# Patient Record
Sex: FEMALE | Race: WHITE | Employment: OTHER | URBAN - NONMETROPOLITAN AREA
[De-identification: names, ages, dates, MRNs, and addresses within clinical notes are randomized per-mention and may not be internally consistent; named-entity substitution may affect disease eponyms.]

---

## 2017-03-24 ENCOUNTER — HOSPITAL ENCOUNTER (OUTPATIENT)
Dept: WOMENS IMAGING | Age: 72
Discharge: HOME | End: 2017-03-24
Payer: MEDICARE

## 2017-03-24 DIAGNOSIS — M81.0 OSTEOPOROSIS, POSTMENOPAUSAL: ICD-10-CM

## 2017-03-24 DIAGNOSIS — Z12.31 SCREENING MAMMOGRAM, ENCOUNTER FOR: ICD-10-CM

## 2017-03-24 PROCEDURE — 77080 DXA BONE DENSITY AXIAL: CPT

## 2017-03-24 PROCEDURE — G0202 SCR MAMMO BI INCL CAD: HCPCS

## 2017-05-02 PROBLEM — M81.0 OSTEOPOROSIS, UNSPECIFIED: Status: ACTIVE | Noted: 2017-05-02

## 2017-05-04 ENCOUNTER — INFUSION (OUTPATIENT)
Dept: ONCOLOGY | Facility: HOSPITAL | Age: 72
End: 2017-05-04

## 2017-07-05 RX ORDER — CALCITONIN SALMON 200 [IU]/.09ML
1 SPRAY, METERED NASAL DAILY
Qty: 1 BOTTLE | Refills: 3 | Status: SHIPPED | OUTPATIENT
Start: 2017-07-05 | End: 2017-09-19 | Stop reason: CLARIF

## 2017-09-09 PROBLEM — R73.01 IFG (IMPAIRED FASTING GLUCOSE): Status: ACTIVE | Noted: 2017-09-09

## 2017-09-09 PROBLEM — Z12.12 SCREENING FOR COLORECTAL CANCER: Status: ACTIVE | Noted: 2017-09-09

## 2017-09-09 PROBLEM — Z12.11 SCREENING FOR COLORECTAL CANCER: Status: ACTIVE | Noted: 2017-09-09

## 2017-09-09 PROBLEM — E78.00 PURE HYPERCHOLESTEROLEMIA: Status: ACTIVE | Noted: 2017-09-09

## 2017-09-09 PROBLEM — I10 ESSENTIAL HYPERTENSION: Status: ACTIVE | Noted: 2017-09-09

## 2017-09-09 PROBLEM — M81.6 LOCALIZED OSTEOPOROSIS WITHOUT CURRENT PATHOLOGICAL FRACTURE: Status: ACTIVE | Noted: 2017-09-09

## 2017-09-12 DIAGNOSIS — E78.00 PURE HYPERCHOLESTEROLEMIA: ICD-10-CM

## 2017-09-12 DIAGNOSIS — R30.0 DYSURIA: ICD-10-CM

## 2017-09-12 DIAGNOSIS — I10 ESSENTIAL HYPERTENSION: Primary | ICD-10-CM

## 2017-09-14 DIAGNOSIS — R30.0 DYSURIA: ICD-10-CM

## 2017-09-14 DIAGNOSIS — E78.00 PURE HYPERCHOLESTEROLEMIA: ICD-10-CM

## 2017-09-14 DIAGNOSIS — I10 ESSENTIAL HYPERTENSION: ICD-10-CM

## 2017-09-14 LAB
ALBUMIN SERPL-MCNC: 4.2 G/DL (ref 3.5–5.2)
ALP BLD-CCNC: 78 U/L (ref 35–104)
ALT SERPL-CCNC: 21 U/L (ref 5–33)
ANION GAP SERPL CALCULATED.3IONS-SCNC: 12 MMOL/L (ref 7–19)
AST SERPL-CCNC: 20 U/L (ref 5–32)
BACTERIA: NEGATIVE /HPF
BILIRUB SERPL-MCNC: 0.4 MG/DL (ref 0.2–1.2)
BILIRUBIN URINE: NEGATIVE
BLOOD, URINE: NEGATIVE
BUN BLDV-MCNC: 9 MG/DL (ref 8–23)
CALCIUM SERPL-MCNC: 10 MG/DL (ref 8.8–10.2)
CHLORIDE BLD-SCNC: 100 MMOL/L (ref 98–111)
CLARITY: CLEAR
CO2: 27 MMOL/L (ref 22–29)
COLOR: YELLOW
CREAT SERPL-MCNC: 0.4 MG/DL (ref 0.5–0.9)
EPITHELIAL CELLS, UA: 2 /HPF (ref 0–5)
GFR NON-AFRICAN AMERICAN: >60
GLUCOSE BLD-MCNC: 87 MG/DL (ref 74–109)
GLUCOSE URINE: NEGATIVE MG/DL
HCT VFR BLD CALC: 43.4 % (ref 37–47)
HEMOGLOBIN: 14.8 G/DL (ref 12–16)
HYALINE CASTS: 1 /HPF (ref 0–8)
KETONES, URINE: NEGATIVE MG/DL
LEUKOCYTE ESTERASE, URINE: ABNORMAL
MCH RBC QN AUTO: 29.2 PG (ref 27–31)
MCHC RBC AUTO-ENTMCNC: 34.1 G/DL (ref 33–37)
MCV RBC AUTO: 85.8 FL (ref 81–99)
NITRITE, URINE: NEGATIVE
PDW BLD-RTO: 12.5 % (ref 11.5–14.5)
PH UA: 6.5
PLATELET # BLD: 168 K/UL (ref 130–400)
PMV BLD AUTO: 10.3 FL (ref 9.4–12.3)
POTASSIUM SERPL-SCNC: 4 MMOL/L (ref 3.5–5)
PROTEIN UA: NEGATIVE MG/DL
RBC # BLD: 5.06 M/UL (ref 4.2–5.4)
RBC UA: 1 /HPF (ref 0–4)
SODIUM BLD-SCNC: 139 MMOL/L (ref 136–145)
SPECIFIC GRAVITY UA: 1.01
TOTAL PROTEIN: 7.5 G/DL (ref 6.6–8.7)
UROBILINOGEN, URINE: 0.2 E.U./DL
WBC # BLD: 7.3 K/UL (ref 4.8–10.8)
WBC UA: 5 /HPF (ref 0–5)

## 2017-09-14 PROCEDURE — 81003 URINALYSIS AUTO W/O SCOPE: CPT | Performed by: INTERNAL MEDICINE

## 2017-09-14 RX ORDER — LISINOPRIL AND HYDROCHLOROTHIAZIDE 20; 12.5 MG/1; MG/1
1 TABLET ORAL DAILY
COMMUNITY
End: 2017-09-19 | Stop reason: SDUPTHER

## 2017-09-14 RX ORDER — ASCORBIC ACID 500 MG
1000 TABLET ORAL DAILY
COMMUNITY
End: 2020-01-01

## 2017-09-14 RX ORDER — SIMVASTATIN 40 MG
40 TABLET ORAL NIGHTLY
COMMUNITY
End: 2017-09-19 | Stop reason: SDUPTHER

## 2017-09-14 RX ORDER — MAGNESIUM GLUCONATE 27 MG(500)
500 TABLET ORAL DAILY
COMMUNITY
End: 2020-01-01

## 2017-09-14 RX ORDER — IBANDRONATE SODIUM 150 MG/1
150 TABLET, FILM COATED ORAL
COMMUNITY
End: 2017-09-19 | Stop reason: CLARIF

## 2017-09-16 LAB — URINE CULTURE, ROUTINE: NORMAL

## 2017-09-19 ENCOUNTER — TELEPHONE (OUTPATIENT)
Dept: INTERNAL MEDICINE | Age: 72
End: 2017-09-19

## 2017-09-19 ENCOUNTER — OFFICE VISIT (OUTPATIENT)
Dept: INTERNAL MEDICINE | Age: 72
End: 2017-09-19
Payer: MEDICARE

## 2017-09-19 VITALS
HEIGHT: 60 IN | DIASTOLIC BLOOD PRESSURE: 84 MMHG | SYSTOLIC BLOOD PRESSURE: 130 MMHG | WEIGHT: 127 LBS | BODY MASS INDEX: 24.94 KG/M2 | OXYGEN SATURATION: 94 % | HEART RATE: 90 BPM

## 2017-09-19 DIAGNOSIS — R73.01 IFG (IMPAIRED FASTING GLUCOSE): ICD-10-CM

## 2017-09-19 DIAGNOSIS — M81.6 LOCALIZED OSTEOPOROSIS WITHOUT CURRENT PATHOLOGICAL FRACTURE: ICD-10-CM

## 2017-09-19 DIAGNOSIS — E78.00 PURE HYPERCHOLESTEROLEMIA: ICD-10-CM

## 2017-09-19 DIAGNOSIS — I10 ESSENTIAL HYPERTENSION: Primary | ICD-10-CM

## 2017-09-19 PROCEDURE — G8420 CALC BMI NORM PARAMETERS: HCPCS | Performed by: INTERNAL MEDICINE

## 2017-09-19 PROCEDURE — 3017F COLORECTAL CA SCREEN DOC REV: CPT | Performed by: INTERNAL MEDICINE

## 2017-09-19 PROCEDURE — 1123F ACP DISCUSS/DSCN MKR DOCD: CPT | Performed by: INTERNAL MEDICINE

## 2017-09-19 PROCEDURE — 3014F SCREEN MAMMO DOC REV: CPT | Performed by: INTERNAL MEDICINE

## 2017-09-19 PROCEDURE — G8399 PT W/DXA RESULTS DOCUMENT: HCPCS | Performed by: INTERNAL MEDICINE

## 2017-09-19 PROCEDURE — 1090F PRES/ABSN URINE INCON ASSESS: CPT | Performed by: INTERNAL MEDICINE

## 2017-09-19 PROCEDURE — 99214 OFFICE O/P EST MOD 30 MIN: CPT | Performed by: INTERNAL MEDICINE

## 2017-09-19 PROCEDURE — G8427 DOCREV CUR MEDS BY ELIG CLIN: HCPCS | Performed by: INTERNAL MEDICINE

## 2017-09-19 PROCEDURE — 4040F PNEUMOC VAC/ADMIN/RCVD: CPT | Performed by: INTERNAL MEDICINE

## 2017-09-19 PROCEDURE — 1036F TOBACCO NON-USER: CPT | Performed by: INTERNAL MEDICINE

## 2017-09-19 PROCEDURE — 4005F PHARM THX FOR OP RXD: CPT | Performed by: INTERNAL MEDICINE

## 2017-09-19 RX ORDER — CALCITONIN SALMON 200 [IU]/.09ML
1 SPRAY, METERED NASAL DAILY
COMMUNITY
End: 2017-09-19 | Stop reason: SDUPTHER

## 2017-09-19 RX ORDER — ALENDRONATE SODIUM 70 MG/1
70 TABLET ORAL DAILY
Qty: 4 TABLET | Refills: 5 | Status: SHIPPED | OUTPATIENT
Start: 2017-09-19 | End: 2017-09-19 | Stop reason: SDUPTHER

## 2017-09-19 RX ORDER — ALENDRONATE SODIUM 70 MG/1
1 TABLET ORAL DAILY
COMMUNITY
Start: 2017-07-06 | End: 2017-09-19 | Stop reason: SDUPTHER

## 2017-09-19 RX ORDER — CALCITONIN SALMON 200 [IU]/.09ML
1 SPRAY, METERED NASAL DAILY
Qty: 1 BOTTLE | Refills: 5 | Status: SHIPPED | OUTPATIENT
Start: 2017-09-19 | End: 2017-09-19 | Stop reason: SDUPTHER

## 2017-09-19 RX ORDER — LISINOPRIL AND HYDROCHLOROTHIAZIDE 20; 12.5 MG/1; MG/1
1 TABLET ORAL DAILY
Qty: 30 TABLET | Refills: 5 | Status: SHIPPED | OUTPATIENT
Start: 2017-09-19 | End: 2017-09-19 | Stop reason: SDUPTHER

## 2017-09-19 RX ORDER — LISINOPRIL AND HYDROCHLOROTHIAZIDE 20; 12.5 MG/1; MG/1
1 TABLET ORAL DAILY
Qty: 30 TABLET | Refills: 5 | Status: SHIPPED | OUTPATIENT
Start: 2017-09-19 | End: 2018-04-02 | Stop reason: SDUPTHER

## 2017-09-19 RX ORDER — CALCITONIN SALMON 200 [IU]/.09ML
1 SPRAY, METERED NASAL DAILY
Qty: 1 BOTTLE | Refills: 5 | Status: SHIPPED | OUTPATIENT
Start: 2017-09-19 | End: 2018-06-05 | Stop reason: SDUPTHER

## 2017-09-19 RX ORDER — ALENDRONATE SODIUM 70 MG/1
70 TABLET ORAL
Qty: 4 TABLET | Refills: 5 | Status: SHIPPED | OUTPATIENT
Start: 2017-09-19 | End: 2018-03-12 | Stop reason: SDUPTHER

## 2017-09-19 RX ORDER — SIMVASTATIN 40 MG
40 TABLET ORAL NIGHTLY
Qty: 30 TABLET | Refills: 5 | Status: SHIPPED | OUTPATIENT
Start: 2017-09-19 | End: 2017-09-19 | Stop reason: SDUPTHER

## 2017-09-19 RX ORDER — SIMVASTATIN 40 MG
40 TABLET ORAL NIGHTLY
Qty: 30 TABLET | Refills: 5 | Status: SHIPPED | OUTPATIENT
Start: 2017-09-19 | End: 2018-05-01 | Stop reason: SDUPTHER

## 2017-09-19 RX ORDER — ALENDRONATE SODIUM 70 MG/1
70 TABLET ORAL DAILY
Qty: 30 TABLET | Refills: 5 | Status: SHIPPED | OUTPATIENT
Start: 2017-09-19 | End: 2017-09-19 | Stop reason: SDUPTHER

## 2017-09-19 ASSESSMENT — ENCOUNTER SYMPTOMS
WHEEZING: 0
SORE THROAT: 0
ABDOMINAL PAIN: 0
CHEST TIGHTNESS: 0
CONSTIPATION: 0
COUGH: 0

## 2017-10-26 ENCOUNTER — OFFICE VISIT (OUTPATIENT)
Dept: RETAIL CLINIC | Facility: CLINIC | Age: 72
End: 2017-10-26

## 2017-10-26 DIAGNOSIS — Z23 FLU VACCINE NEED: Primary | ICD-10-CM

## 2017-10-26 NOTE — PROGRESS NOTES
Patient presented requesting flu shot which was administered per protocol. See [employer] forms.

## 2017-11-01 ENCOUNTER — TELEPHONE (OUTPATIENT)
Dept: INTERNAL MEDICINE | Age: 72
End: 2017-11-01

## 2018-03-12 RX ORDER — ALENDRONATE SODIUM 70 MG/1
70 TABLET ORAL
Qty: 12 TABLET | Refills: 3 | Status: SHIPPED | OUTPATIENT
Start: 2018-03-12 | End: 2018-06-05 | Stop reason: SDUPTHER

## 2018-04-02 RX ORDER — LISINOPRIL AND HYDROCHLOROTHIAZIDE 20; 12.5 MG/1; MG/1
1 TABLET ORAL DAILY
Qty: 30 TABLET | Refills: 5 | Status: SHIPPED | OUTPATIENT
Start: 2018-04-02 | End: 2018-06-05 | Stop reason: SDUPTHER

## 2018-05-01 RX ORDER — SIMVASTATIN 40 MG
40 TABLET ORAL NIGHTLY
Qty: 30 TABLET | Refills: 5 | Status: SHIPPED | OUTPATIENT
Start: 2018-05-01 | End: 2018-06-05 | Stop reason: SDUPTHER

## 2018-05-24 DIAGNOSIS — M81.6 LOCALIZED OSTEOPOROSIS WITHOUT CURRENT PATHOLOGICAL FRACTURE: ICD-10-CM

## 2018-05-24 DIAGNOSIS — I10 ESSENTIAL HYPERTENSION: ICD-10-CM

## 2018-05-24 DIAGNOSIS — E78.00 PURE HYPERCHOLESTEROLEMIA: ICD-10-CM

## 2018-05-24 DIAGNOSIS — R73.01 IFG (IMPAIRED FASTING GLUCOSE): ICD-10-CM

## 2018-05-24 LAB
ALBUMIN SERPL-MCNC: 4.3 G/DL (ref 3.5–5.2)
ALP BLD-CCNC: 88 U/L (ref 35–104)
ALT SERPL-CCNC: 22 U/L (ref 5–33)
ANION GAP SERPL CALCULATED.3IONS-SCNC: 14 MMOL/L (ref 7–19)
AST SERPL-CCNC: 19 U/L (ref 5–32)
BILIRUB SERPL-MCNC: 0.4 MG/DL (ref 0.2–1.2)
BUN BLDV-MCNC: 10 MG/DL (ref 8–23)
CALCIUM SERPL-MCNC: 9.8 MG/DL (ref 8.8–10.2)
CHLORIDE BLD-SCNC: 98 MMOL/L (ref 98–111)
CHOLESTEROL, TOTAL: 173 MG/DL (ref 160–199)
CO2: 26 MMOL/L (ref 22–29)
CREAT SERPL-MCNC: <0.5 MG/DL (ref 0.5–0.9)
GFR NON-AFRICAN AMERICAN: >60
GLUCOSE BLD-MCNC: 91 MG/DL (ref 74–109)
HBA1C MFR BLD: 5.9 %
HDLC SERPL-MCNC: 55 MG/DL (ref 65–121)
LDL CHOLESTEROL CALCULATED: 98 MG/DL
POTASSIUM SERPL-SCNC: 4 MMOL/L (ref 3.5–5)
SODIUM BLD-SCNC: 138 MMOL/L (ref 136–145)
TOTAL PROTEIN: 7.3 G/DL (ref 6.6–8.7)
TRIGL SERPL-MCNC: 100 MG/DL (ref 0–149)
TSH SERPL DL<=0.05 MIU/L-ACNC: 2.37 UIU/ML (ref 0.27–4.2)
VITAMIN D 25-HYDROXY: 39.3 NG/ML

## 2018-06-05 ENCOUNTER — OFFICE VISIT (OUTPATIENT)
Dept: INTERNAL MEDICINE | Age: 73
End: 2018-06-05
Payer: MEDICARE

## 2018-06-05 VITALS
HEART RATE: 99 BPM | WEIGHT: 135.2 LBS | SYSTOLIC BLOOD PRESSURE: 122 MMHG | BODY MASS INDEX: 26.55 KG/M2 | OXYGEN SATURATION: 99 % | DIASTOLIC BLOOD PRESSURE: 74 MMHG | HEIGHT: 60 IN

## 2018-06-05 DIAGNOSIS — I10 ESSENTIAL HYPERTENSION: ICD-10-CM

## 2018-06-05 DIAGNOSIS — Z12.31 SCREENING MAMMOGRAM, ENCOUNTER FOR: ICD-10-CM

## 2018-06-05 DIAGNOSIS — Z00.00 MEDICARE ANNUAL WELLNESS VISIT, SUBSEQUENT: Primary | ICD-10-CM

## 2018-06-05 DIAGNOSIS — E78.00 PURE HYPERCHOLESTEROLEMIA: ICD-10-CM

## 2018-06-05 DIAGNOSIS — M81.6 LOCALIZED OSTEOPOROSIS WITHOUT CURRENT PATHOLOGICAL FRACTURE: ICD-10-CM

## 2018-06-05 DIAGNOSIS — R73.01 IFG (IMPAIRED FASTING GLUCOSE): ICD-10-CM

## 2018-06-05 PROCEDURE — G8399 PT W/DXA RESULTS DOCUMENT: HCPCS | Performed by: INTERNAL MEDICINE

## 2018-06-05 PROCEDURE — 3017F COLORECTAL CA SCREEN DOC REV: CPT | Performed by: INTERNAL MEDICINE

## 2018-06-05 PROCEDURE — 1090F PRES/ABSN URINE INCON ASSESS: CPT | Performed by: INTERNAL MEDICINE

## 2018-06-05 PROCEDURE — 4040F PNEUMOC VAC/ADMIN/RCVD: CPT | Performed by: INTERNAL MEDICINE

## 2018-06-05 PROCEDURE — 1036F TOBACCO NON-USER: CPT | Performed by: INTERNAL MEDICINE

## 2018-06-05 PROCEDURE — G0439 PPPS, SUBSEQ VISIT: HCPCS | Performed by: INTERNAL MEDICINE

## 2018-06-05 PROCEDURE — G8427 DOCREV CUR MEDS BY ELIG CLIN: HCPCS | Performed by: INTERNAL MEDICINE

## 2018-06-05 PROCEDURE — 1123F ACP DISCUSS/DSCN MKR DOCD: CPT | Performed by: INTERNAL MEDICINE

## 2018-06-05 PROCEDURE — G8419 CALC BMI OUT NRM PARAM NOF/U: HCPCS | Performed by: INTERNAL MEDICINE

## 2018-06-05 PROCEDURE — 99213 OFFICE O/P EST LOW 20 MIN: CPT | Performed by: INTERNAL MEDICINE

## 2018-06-05 RX ORDER — SIMVASTATIN 40 MG
40 TABLET ORAL NIGHTLY
Qty: 90 TABLET | Refills: 2 | Status: SHIPPED | OUTPATIENT
Start: 2018-06-05 | End: 2018-12-06 | Stop reason: SDUPTHER

## 2018-06-05 RX ORDER — CALCITONIN SALMON 200 [IU]/.09ML
1 SPRAY, METERED NASAL DAILY
Qty: 1 BOTTLE | Refills: 0 | Status: SHIPPED | OUTPATIENT
Start: 2018-06-05 | End: 2018-12-06 | Stop reason: SDUPTHER

## 2018-06-05 RX ORDER — LISINOPRIL AND HYDROCHLOROTHIAZIDE 20; 12.5 MG/1; MG/1
1 TABLET ORAL DAILY
Qty: 90 TABLET | Refills: 2 | Status: SHIPPED | OUTPATIENT
Start: 2018-06-05 | End: 2018-12-06 | Stop reason: SDUPTHER

## 2018-06-05 RX ORDER — ALENDRONATE SODIUM 70 MG/1
70 TABLET ORAL
Qty: 12 TABLET | Refills: 3 | Status: SHIPPED | OUTPATIENT
Start: 2018-06-05 | End: 2018-12-06 | Stop reason: SDUPTHER

## 2018-06-05 ASSESSMENT — LIFESTYLE VARIABLES: HOW OFTEN DO YOU HAVE A DRINK CONTAINING ALCOHOL: 0

## 2018-06-05 ASSESSMENT — ENCOUNTER SYMPTOMS
DIARRHEA: 0
EYE PAIN: 0
CONSTIPATION: 0
SORE THROAT: 0
WHEEZING: 0
COUGH: 0
VOMITING: 0
VOICE CHANGE: 0
BLOOD IN STOOL: 0
NAUSEA: 0
CHEST TIGHTNESS: 0
COLOR CHANGE: 0
EYE REDNESS: 0
SINUS PRESSURE: 0
TROUBLE SWALLOWING: 0
ABDOMINAL PAIN: 0

## 2018-06-05 ASSESSMENT — ANXIETY QUESTIONNAIRES: GAD7 TOTAL SCORE: 0

## 2018-06-05 ASSESSMENT — PATIENT HEALTH QUESTIONNAIRE - PHQ9: SUM OF ALL RESPONSES TO PHQ QUESTIONS 1-9: 0

## 2018-06-14 ENCOUNTER — HOSPITAL ENCOUNTER (OUTPATIENT)
Dept: WOMENS IMAGING | Age: 73
Discharge: HOME | End: 2018-06-14
Payer: MEDICARE

## 2018-06-14 DIAGNOSIS — Z12.31 SCREENING MAMMOGRAM, ENCOUNTER FOR: ICD-10-CM

## 2018-06-14 PROCEDURE — 77063 BREAST TOMOSYNTHESIS BI: CPT

## 2018-07-05 PROBLEM — Z12.31 SCREENING MAMMOGRAM, ENCOUNTER FOR: Status: RESOLVED | Noted: 2018-06-05 | Resolved: 2018-07-05

## 2018-07-05 PROBLEM — Z00.00 MEDICARE ANNUAL WELLNESS VISIT, SUBSEQUENT: Status: RESOLVED | Noted: 2018-06-05 | Resolved: 2018-07-05

## 2018-09-26 PROBLEM — Z12.12 SCREENING FOR COLORECTAL CANCER: Status: RESOLVED | Noted: 2017-09-09 | Resolved: 2018-09-26

## 2018-09-26 PROBLEM — Z12.11 SCREENING FOR COLORECTAL CANCER: Status: RESOLVED | Noted: 2017-09-09 | Resolved: 2018-09-26

## 2018-09-26 RX ORDER — LISINOPRIL AND HYDROCHLOROTHIAZIDE 20; 12.5 MG/1; MG/1
TABLET ORAL
Qty: 90 TABLET | Refills: 1 | Status: SHIPPED | OUTPATIENT
Start: 2018-09-26 | End: 2018-12-06 | Stop reason: SDUPTHER

## 2018-09-26 NOTE — TELEPHONE ENCOUNTER
Requested Prescriptions     Pending Prescriptions Disp Refills    lisinopril-hydrochlorothiazide (PRINZIDE;ZESTORETIC) 20-12.5 MG per tablet [Pharmacy Med Name: LISINOPRIL/HCTZ 20-12.5MG TAB] 90 tablet 1     Sig: TAKE 1 TABLET BY MOUTH ONCE DAILY

## 2018-09-27 ENCOUNTER — OFFICE VISIT (OUTPATIENT)
Dept: RETAIL CLINIC | Facility: CLINIC | Age: 73
End: 2018-09-27

## 2018-09-27 DIAGNOSIS — Z23 FLU VACCINE NEED: Primary | ICD-10-CM

## 2018-11-01 RX ORDER — SIMVASTATIN 40 MG
40 TABLET ORAL NIGHTLY
Qty: 30 TABLET | Refills: 5 | Status: SHIPPED | OUTPATIENT
Start: 2018-11-01 | End: 2018-12-06 | Stop reason: SDUPTHER

## 2018-11-29 DIAGNOSIS — E78.00 PURE HYPERCHOLESTEROLEMIA: ICD-10-CM

## 2018-11-29 DIAGNOSIS — R73.01 IFG (IMPAIRED FASTING GLUCOSE): ICD-10-CM

## 2018-11-29 DIAGNOSIS — M81.6 LOCALIZED OSTEOPOROSIS WITHOUT CURRENT PATHOLOGICAL FRACTURE: ICD-10-CM

## 2018-11-29 DIAGNOSIS — I10 ESSENTIAL HYPERTENSION: ICD-10-CM

## 2018-11-29 LAB
ALBUMIN SERPL-MCNC: 4.3 G/DL (ref 3.5–5.2)
ALP BLD-CCNC: 83 U/L (ref 35–104)
ALT SERPL-CCNC: 23 U/L (ref 5–33)
ANION GAP SERPL CALCULATED.3IONS-SCNC: 15 MMOL/L (ref 7–19)
AST SERPL-CCNC: 23 U/L (ref 5–32)
BILIRUB SERPL-MCNC: 0.5 MG/DL (ref 0.2–1.2)
BUN BLDV-MCNC: 9 MG/DL (ref 8–23)
CALCIUM SERPL-MCNC: 10.1 MG/DL (ref 8.8–10.2)
CHLORIDE BLD-SCNC: 100 MMOL/L (ref 98–111)
CHOLESTEROL, TOTAL: 163 MG/DL (ref 160–199)
CO2: 25 MMOL/L (ref 22–29)
CREAT SERPL-MCNC: 0.5 MG/DL (ref 0.5–0.9)
GFR NON-AFRICAN AMERICAN: >60
GLUCOSE BLD-MCNC: 91 MG/DL (ref 74–109)
HBA1C MFR BLD: 5.6 % (ref 4–6)
HDLC SERPL-MCNC: 57 MG/DL (ref 65–121)
LDL CHOLESTEROL CALCULATED: 91 MG/DL
POTASSIUM SERPL-SCNC: 3.8 MMOL/L (ref 3.5–5)
SODIUM BLD-SCNC: 140 MMOL/L (ref 136–145)
TOTAL PROTEIN: 7.3 G/DL (ref 6.6–8.7)
TRIGL SERPL-MCNC: 77 MG/DL (ref 0–149)
VITAMIN D 25-HYDROXY: 41.1 NG/ML

## 2018-12-06 ENCOUNTER — OFFICE VISIT (OUTPATIENT)
Dept: INTERNAL MEDICINE | Age: 73
End: 2018-12-06
Payer: MEDICARE

## 2018-12-06 VITALS
HEIGHT: 60 IN | SYSTOLIC BLOOD PRESSURE: 124 MMHG | HEART RATE: 81 BPM | BODY MASS INDEX: 24.74 KG/M2 | OXYGEN SATURATION: 97 % | DIASTOLIC BLOOD PRESSURE: 80 MMHG | WEIGHT: 126 LBS

## 2018-12-06 DIAGNOSIS — I10 ESSENTIAL HYPERTENSION: Primary | ICD-10-CM

## 2018-12-06 DIAGNOSIS — E78.00 PURE HYPERCHOLESTEROLEMIA: ICD-10-CM

## 2018-12-06 DIAGNOSIS — R73.01 IFG (IMPAIRED FASTING GLUCOSE): ICD-10-CM

## 2018-12-06 DIAGNOSIS — M81.6 LOCALIZED OSTEOPOROSIS WITHOUT CURRENT PATHOLOGICAL FRACTURE: ICD-10-CM

## 2018-12-06 DIAGNOSIS — R19.03 RIGHT LOWER QUADRANT ABDOMINAL MASS: ICD-10-CM

## 2018-12-06 PROBLEM — R30.0 DYSURIA: Status: RESOLVED | Noted: 2017-09-12 | Resolved: 2018-12-06

## 2018-12-06 PROCEDURE — G8420 CALC BMI NORM PARAMETERS: HCPCS | Performed by: INTERNAL MEDICINE

## 2018-12-06 PROCEDURE — 1036F TOBACCO NON-USER: CPT | Performed by: INTERNAL MEDICINE

## 2018-12-06 PROCEDURE — G8427 DOCREV CUR MEDS BY ELIG CLIN: HCPCS | Performed by: INTERNAL MEDICINE

## 2018-12-06 PROCEDURE — 1090F PRES/ABSN URINE INCON ASSESS: CPT | Performed by: INTERNAL MEDICINE

## 2018-12-06 PROCEDURE — G8482 FLU IMMUNIZE ORDER/ADMIN: HCPCS | Performed by: INTERNAL MEDICINE

## 2018-12-06 PROCEDURE — G8399 PT W/DXA RESULTS DOCUMENT: HCPCS | Performed by: INTERNAL MEDICINE

## 2018-12-06 PROCEDURE — 3017F COLORECTAL CA SCREEN DOC REV: CPT | Performed by: INTERNAL MEDICINE

## 2018-12-06 PROCEDURE — 99214 OFFICE O/P EST MOD 30 MIN: CPT | Performed by: INTERNAL MEDICINE

## 2018-12-06 PROCEDURE — 1123F ACP DISCUSS/DSCN MKR DOCD: CPT | Performed by: INTERNAL MEDICINE

## 2018-12-06 PROCEDURE — 4040F PNEUMOC VAC/ADMIN/RCVD: CPT | Performed by: INTERNAL MEDICINE

## 2018-12-06 PROCEDURE — 1101F PT FALLS ASSESS-DOCD LE1/YR: CPT | Performed by: INTERNAL MEDICINE

## 2018-12-06 RX ORDER — SIMVASTATIN 40 MG
40 TABLET ORAL NIGHTLY
Qty: 90 TABLET | Refills: 2 | Status: SHIPPED | OUTPATIENT
Start: 2018-12-06 | End: 2019-06-06 | Stop reason: SDUPTHER

## 2018-12-06 RX ORDER — PHENOL 1.4 %
AEROSOL, SPRAY (ML) MUCOUS MEMBRANE
COMMUNITY
End: 2019-11-19

## 2018-12-06 RX ORDER — CALCITONIN SALMON 200 [IU]/.09ML
1 SPRAY, METERED NASAL DAILY
Qty: 1 BOTTLE | Refills: 3 | Status: SHIPPED | OUTPATIENT
Start: 2018-12-06 | End: 2019-06-06 | Stop reason: SDUPTHER

## 2018-12-06 RX ORDER — LISINOPRIL AND HYDROCHLOROTHIAZIDE 20; 12.5 MG/1; MG/1
TABLET ORAL
Qty: 90 TABLET | Refills: 2 | Status: SHIPPED | OUTPATIENT
Start: 2018-12-06 | End: 2019-06-06 | Stop reason: SDUPTHER

## 2018-12-06 RX ORDER — ALENDRONATE SODIUM 70 MG/1
70 TABLET ORAL
Qty: 12 TABLET | Refills: 3 | Status: SHIPPED | OUTPATIENT
Start: 2018-12-06 | End: 2019-06-06 | Stop reason: SDUPTHER

## 2018-12-06 ASSESSMENT — ENCOUNTER SYMPTOMS
WHEEZING: 0
CHEST TIGHTNESS: 0
COUGH: 0
ABDOMINAL PAIN: 0
CONSTIPATION: 0
SORE THROAT: 0

## 2018-12-06 NOTE — PROGRESS NOTES
will continue Fosamax weekly and Miacalcin nose spray, she had reviewed prolia  side effects online and states that she will never take this medication  Will plan repeat BD 2019  Vit D level good range at 41    RT lower abd mass as above  Possibly hernia but not soft or reducible on palpation  Obtain  ct      Orders Placed This Encounter   Procedures    CT ABDOMEN PELVIS WO CONTRAST Additional Contrast? None    CBC Auto Differential    Comprehensive Metabolic Panel    Hemoglobin A1C    Lipid Panel    Urinalysis    TSH without Reflex    Vitamin D 25 Hydroxy     New Prescriptions    No medications on file        Return in about 6 months (around 6/6/2019) for Annual Physical.   There are no Patient Instructions on file for this visit. EMR Dragon/transcription disclaimer:Significant part of this  encounter note is electronic transcription/translationof spoken language to printed text. The electronic translation of spoken language may be erroneous, or at times, nonsensical words or phrases may be inadvertently transcribed.  Although I have reviewed the note for sucherrors, some may still exist.

## 2018-12-13 ENCOUNTER — TELEPHONE (OUTPATIENT)
Dept: INTERNAL MEDICINE | Age: 73
End: 2018-12-13

## 2018-12-13 ENCOUNTER — HOSPITAL ENCOUNTER (OUTPATIENT)
Dept: CT IMAGING | Age: 73
Discharge: HOME | End: 2018-12-13
Payer: MEDICARE

## 2018-12-13 DIAGNOSIS — R19.03 RIGHT LOWER QUADRANT ABDOMINAL MASS: ICD-10-CM

## 2018-12-13 PROCEDURE — 74176 CT ABD & PELVIS W/O CONTRAST: CPT

## 2018-12-20 ENCOUNTER — OFFICE VISIT (OUTPATIENT)
Dept: INTERNAL MEDICINE | Age: 73
End: 2018-12-20
Payer: MEDICARE

## 2018-12-20 VITALS
WEIGHT: 128 LBS | BODY MASS INDEX: 25.8 KG/M2 | HEIGHT: 59 IN | SYSTOLIC BLOOD PRESSURE: 128 MMHG | HEART RATE: 90 BPM | DIASTOLIC BLOOD PRESSURE: 82 MMHG | OXYGEN SATURATION: 98 %

## 2018-12-20 DIAGNOSIS — K41.21: ICD-10-CM

## 2018-12-20 DIAGNOSIS — N89.8 VAGINAL CYST: ICD-10-CM

## 2018-12-20 DIAGNOSIS — N90.89 LESION OF LABIA: Primary | ICD-10-CM

## 2018-12-20 PROCEDURE — 99213 OFFICE O/P EST LOW 20 MIN: CPT | Performed by: INTERNAL MEDICINE

## 2018-12-20 PROCEDURE — G8399 PT W/DXA RESULTS DOCUMENT: HCPCS | Performed by: INTERNAL MEDICINE

## 2018-12-20 PROCEDURE — 4040F PNEUMOC VAC/ADMIN/RCVD: CPT | Performed by: INTERNAL MEDICINE

## 2018-12-20 PROCEDURE — 1090F PRES/ABSN URINE INCON ASSESS: CPT | Performed by: INTERNAL MEDICINE

## 2018-12-20 PROCEDURE — 1036F TOBACCO NON-USER: CPT | Performed by: INTERNAL MEDICINE

## 2018-12-20 PROCEDURE — G8482 FLU IMMUNIZE ORDER/ADMIN: HCPCS | Performed by: INTERNAL MEDICINE

## 2018-12-20 PROCEDURE — 1123F ACP DISCUSS/DSCN MKR DOCD: CPT | Performed by: INTERNAL MEDICINE

## 2018-12-20 PROCEDURE — G8427 DOCREV CUR MEDS BY ELIG CLIN: HCPCS | Performed by: INTERNAL MEDICINE

## 2018-12-20 PROCEDURE — 3017F COLORECTAL CA SCREEN DOC REV: CPT | Performed by: INTERNAL MEDICINE

## 2018-12-20 PROCEDURE — 1101F PT FALLS ASSESS-DOCD LE1/YR: CPT | Performed by: INTERNAL MEDICINE

## 2018-12-20 PROCEDURE — G8419 CALC BMI OUT NRM PARAM NOF/U: HCPCS | Performed by: INTERNAL MEDICINE

## 2018-12-20 ASSESSMENT — ENCOUNTER SYMPTOMS
CHEST TIGHTNESS: 0
CONSTIPATION: 0
ABDOMINAL PAIN: 0
WHEEZING: 0
SORE THROAT: 0
COUGH: 0

## 2018-12-20 NOTE — PROGRESS NOTES
Chief Complaint   Patient presents with    Gynecologic Exam     History of presenting illness:  [facility] is a75 y.o. female who presents today for follow up Post recent CAT scan  Impression   No acute abnormality of the abdomen are pelvis. Appendix is not visualized. There are no findings to suggest   appendicitis. The diverticulosis of the colon. No evidence for diverticulitis. Moderately prominent right inguinal lymph node which is a nonspecific   finding of questionable clinical significance. This. Clinically   Correlated. oval solid nodule in the left labium/left vagina wall   measuring 2.5 x 1.6 cm   small fat-containing femoral hernias bilaterally. There is a  tiny fat-containing umbilical hernia.      Signed by Dr Cass De Jesus on 12/13/2018 9:30 AM       Patient Active Problem List    Diagnosis Date Noted    Localized osteoporosis without current pathological fracture 09/09/2017     Overview Note:     3/15 Lspine -3.3; hip-0.9   2017 Lspine -3.6/ fem neck -2.1      Essential hypertension 09/09/2017    Pure hypercholesterolemia 09/09/2017    IFG (impaired fasting glucose) 09/09/2017    Breast density 03/22/2016     Past Medical History:   Diagnosis Date    Hyperlipidemia     Hypertension     Osteoporosis       Past Surgical History:   Procedure Laterality Date    COLONOSCOPY      HYSTERECTOMY, TOTAL ABDOMINAL       Current Outpatient Prescriptions   Medication Sig Dispense Refill    BIOTIN PO Take by mouth      vitamin D (CHOLECALCIFEROL) 1000 UNIT TABS tablet Take 1,000 Units by mouth daily      Glucosamine-Chondroit-Vit C-Mn (GLUCOSAMINE 1500 COMPLEX PO) Take 2 tablets by mouth daily      Melatonin 10 MG TABS Take by mouth      Garlic 7143 MG CAPS Take by mouth      Calcium Carb-Cholecalciferol 600-500 MG-UNIT CAPS Take by mouth      lisinopril-hydrochlorothiazide (PRINZIDE;ZESTORETIC) 20-12.5 MG per tablet TAKE 1 TABLET BY MOUTH ONCE DAILY 90 tablet 2    simvastatin (ZOCOR)

## 2018-12-28 ENCOUNTER — HOSPITAL ENCOUNTER (OUTPATIENT)
Dept: ULTRASOUND IMAGING | Age: 73
Discharge: HOME | End: 2018-12-28
Payer: MEDICARE

## 2018-12-28 DIAGNOSIS — N90.89 LESION OF LABIA: ICD-10-CM

## 2018-12-28 DIAGNOSIS — N89.8 VAGINAL CYST: ICD-10-CM

## 2018-12-28 PROCEDURE — 76856 US EXAM PELVIC COMPLETE: CPT

## 2018-12-31 ENCOUNTER — TELEPHONE (OUTPATIENT)
Dept: INTERNAL MEDICINE | Age: 73
End: 2018-12-31

## 2018-12-31 DIAGNOSIS — N89.8 MASS OF VAGINA: Primary | ICD-10-CM

## 2019-01-03 ENCOUNTER — OFFICE VISIT (OUTPATIENT)
Dept: SURGERY | Age: 74
End: 2019-01-03
Payer: MEDICARE

## 2019-01-03 VITALS
HEIGHT: 59 IN | DIASTOLIC BLOOD PRESSURE: 78 MMHG | WEIGHT: 127.8 LBS | SYSTOLIC BLOOD PRESSURE: 128 MMHG | TEMPERATURE: 97.4 F | BODY MASS INDEX: 25.76 KG/M2

## 2019-01-03 DIAGNOSIS — R59.0 LYMPHADENOPATHY, INGUINAL: Primary | ICD-10-CM

## 2019-01-03 PROCEDURE — 1090F PRES/ABSN URINE INCON ASSESS: CPT | Performed by: PHYSICIAN ASSISTANT

## 2019-01-03 PROCEDURE — G8399 PT W/DXA RESULTS DOCUMENT: HCPCS | Performed by: PHYSICIAN ASSISTANT

## 2019-01-03 PROCEDURE — 99202 OFFICE O/P NEW SF 15 MIN: CPT | Performed by: PHYSICIAN ASSISTANT

## 2019-01-03 PROCEDURE — G8482 FLU IMMUNIZE ORDER/ADMIN: HCPCS | Performed by: PHYSICIAN ASSISTANT

## 2019-01-03 PROCEDURE — 4040F PNEUMOC VAC/ADMIN/RCVD: CPT | Performed by: PHYSICIAN ASSISTANT

## 2019-01-03 PROCEDURE — 1036F TOBACCO NON-USER: CPT | Performed by: PHYSICIAN ASSISTANT

## 2019-01-03 PROCEDURE — G8427 DOCREV CUR MEDS BY ELIG CLIN: HCPCS | Performed by: PHYSICIAN ASSISTANT

## 2019-01-03 PROCEDURE — 1123F ACP DISCUSS/DSCN MKR DOCD: CPT | Performed by: PHYSICIAN ASSISTANT

## 2019-01-03 PROCEDURE — G8419 CALC BMI OUT NRM PARAM NOF/U: HCPCS | Performed by: PHYSICIAN ASSISTANT

## 2019-01-03 PROCEDURE — 3017F COLORECTAL CA SCREEN DOC REV: CPT | Performed by: PHYSICIAN ASSISTANT

## 2019-01-03 PROCEDURE — 1101F PT FALLS ASSESS-DOCD LE1/YR: CPT | Performed by: PHYSICIAN ASSISTANT

## 2019-01-03 RX ORDER — SULFAMETHOXAZOLE AND TRIMETHOPRIM 800; 160 MG/1; MG/1
1 TABLET ORAL 2 TIMES DAILY
Qty: 28 TABLET | Refills: 0 | Status: SHIPPED | OUTPATIENT
Start: 2019-01-03 | End: 2019-01-17

## 2019-01-24 ENCOUNTER — OFFICE VISIT (OUTPATIENT)
Dept: SURGERY | Age: 74
End: 2019-01-24
Payer: MEDICARE

## 2019-01-24 VITALS
BODY MASS INDEX: 25.8 KG/M2 | OXYGEN SATURATION: 97 % | HEIGHT: 59 IN | DIASTOLIC BLOOD PRESSURE: 76 MMHG | WEIGHT: 128 LBS | TEMPERATURE: 98.3 F | HEART RATE: 85 BPM | SYSTOLIC BLOOD PRESSURE: 130 MMHG

## 2019-01-24 DIAGNOSIS — R59.0 INGUINAL LYMPHADENOPATHY: Primary | ICD-10-CM

## 2019-01-24 PROCEDURE — 1123F ACP DISCUSS/DSCN MKR DOCD: CPT | Performed by: PHYSICIAN ASSISTANT

## 2019-01-24 PROCEDURE — G8427 DOCREV CUR MEDS BY ELIG CLIN: HCPCS | Performed by: PHYSICIAN ASSISTANT

## 2019-01-24 PROCEDURE — 4040F PNEUMOC VAC/ADMIN/RCVD: CPT | Performed by: PHYSICIAN ASSISTANT

## 2019-01-24 PROCEDURE — 1090F PRES/ABSN URINE INCON ASSESS: CPT | Performed by: PHYSICIAN ASSISTANT

## 2019-01-24 PROCEDURE — 1101F PT FALLS ASSESS-DOCD LE1/YR: CPT | Performed by: PHYSICIAN ASSISTANT

## 2019-01-24 PROCEDURE — 99212 OFFICE O/P EST SF 10 MIN: CPT | Performed by: PHYSICIAN ASSISTANT

## 2019-01-24 PROCEDURE — G8419 CALC BMI OUT NRM PARAM NOF/U: HCPCS | Performed by: PHYSICIAN ASSISTANT

## 2019-01-24 PROCEDURE — 3017F COLORECTAL CA SCREEN DOC REV: CPT | Performed by: PHYSICIAN ASSISTANT

## 2019-01-24 PROCEDURE — G8399 PT W/DXA RESULTS DOCUMENT: HCPCS | Performed by: PHYSICIAN ASSISTANT

## 2019-01-24 PROCEDURE — G8482 FLU IMMUNIZE ORDER/ADMIN: HCPCS | Performed by: PHYSICIAN ASSISTANT

## 2019-01-24 PROCEDURE — 1036F TOBACCO NON-USER: CPT | Performed by: PHYSICIAN ASSISTANT

## 2019-01-24 RX ORDER — AMOXICILLIN AND CLAVULANATE POTASSIUM 875; 125 MG/1; MG/1
1 TABLET, FILM COATED ORAL 2 TIMES DAILY
Qty: 20 TABLET | Refills: 0 | Status: SHIPPED | OUTPATIENT
Start: 2019-01-24 | End: 2019-02-03

## 2019-01-24 RX ORDER — AMOXICILLIN AND CLAVULANATE POTASSIUM 875; 125 MG/1; MG/1
TABLET, FILM COATED ORAL
COMMUNITY
Start: 2019-01-14 | End: 2019-01-24 | Stop reason: SDUPTHER

## 2019-02-07 ENCOUNTER — OFFICE VISIT (OUTPATIENT)
Dept: SURGERY | Age: 74
End: 2019-02-07
Payer: MEDICARE

## 2019-02-07 VITALS
HEART RATE: 79 BPM | HEIGHT: 59 IN | BODY MASS INDEX: 26.13 KG/M2 | TEMPERATURE: 98.1 F | OXYGEN SATURATION: 99 % | WEIGHT: 129.6 LBS

## 2019-02-07 DIAGNOSIS — R59.0 INGUINAL LYMPHADENOPATHY: Primary | ICD-10-CM

## 2019-02-07 DIAGNOSIS — N75.0 BARTHOLIN CYST: ICD-10-CM

## 2019-02-07 PROCEDURE — G8427 DOCREV CUR MEDS BY ELIG CLIN: HCPCS | Performed by: PHYSICIAN ASSISTANT

## 2019-02-07 PROCEDURE — 1090F PRES/ABSN URINE INCON ASSESS: CPT | Performed by: PHYSICIAN ASSISTANT

## 2019-02-07 PROCEDURE — G8419 CALC BMI OUT NRM PARAM NOF/U: HCPCS | Performed by: PHYSICIAN ASSISTANT

## 2019-02-07 PROCEDURE — G8399 PT W/DXA RESULTS DOCUMENT: HCPCS | Performed by: PHYSICIAN ASSISTANT

## 2019-02-07 PROCEDURE — 3017F COLORECTAL CA SCREEN DOC REV: CPT | Performed by: PHYSICIAN ASSISTANT

## 2019-02-07 PROCEDURE — 1101F PT FALLS ASSESS-DOCD LE1/YR: CPT | Performed by: PHYSICIAN ASSISTANT

## 2019-02-07 PROCEDURE — 4040F PNEUMOC VAC/ADMIN/RCVD: CPT | Performed by: PHYSICIAN ASSISTANT

## 2019-02-07 PROCEDURE — G8482 FLU IMMUNIZE ORDER/ADMIN: HCPCS | Performed by: PHYSICIAN ASSISTANT

## 2019-02-07 PROCEDURE — 1123F ACP DISCUSS/DSCN MKR DOCD: CPT | Performed by: PHYSICIAN ASSISTANT

## 2019-02-07 PROCEDURE — 1036F TOBACCO NON-USER: CPT | Performed by: PHYSICIAN ASSISTANT

## 2019-02-07 PROCEDURE — 99212 OFFICE O/P EST SF 10 MIN: CPT | Performed by: PHYSICIAN ASSISTANT

## 2019-02-07 RX ORDER — DOXYCYCLINE HYCLATE 100 MG/1
100 CAPSULE ORAL 2 TIMES DAILY
Qty: 60 CAPSULE | Refills: 0 | Status: SHIPPED | OUTPATIENT
Start: 2019-02-07 | End: 2019-02-28 | Stop reason: ALTCHOICE

## 2019-02-28 ENCOUNTER — HOSPITAL ENCOUNTER (OUTPATIENT)
Dept: PREADMISSION TESTING | Age: 74
Setting detail: OUTPATIENT SURGERY
Discharge: HOME | End: 2019-03-04

## 2019-02-28 ENCOUNTER — HOSPITAL ENCOUNTER (OUTPATIENT)
Dept: LAB | Age: 74
Discharge: HOME | End: 2019-02-28
Payer: MEDICARE

## 2019-02-28 ENCOUNTER — HOSPITAL ENCOUNTER (OUTPATIENT)
Dept: NON INVASIVE DIAGNOSTICS | Age: 74
Discharge: HOME | End: 2019-02-28
Payer: MEDICARE

## 2019-02-28 ENCOUNTER — OFFICE VISIT (OUTPATIENT)
Dept: SURGERY | Age: 74
End: 2019-02-28
Payer: MEDICARE

## 2019-02-28 ENCOUNTER — OFFICE VISIT (OUTPATIENT)
Dept: OBGYN | Age: 74
End: 2019-02-28
Payer: MEDICARE

## 2019-02-28 VITALS
DIASTOLIC BLOOD PRESSURE: 86 MMHG | WEIGHT: 129 LBS | HEART RATE: 102 BPM | BODY MASS INDEX: 26 KG/M2 | HEIGHT: 59 IN | SYSTOLIC BLOOD PRESSURE: 147 MMHG | TEMPERATURE: 98.3 F | OXYGEN SATURATION: 98 %

## 2019-02-28 VITALS
BODY MASS INDEX: 25.6 KG/M2 | HEART RATE: 102 BPM | SYSTOLIC BLOOD PRESSURE: 147 MMHG | WEIGHT: 127 LBS | DIASTOLIC BLOOD PRESSURE: 86 MMHG | HEIGHT: 59 IN

## 2019-02-28 VITALS — BODY MASS INDEX: 26 KG/M2 | WEIGHT: 129 LBS | HEIGHT: 59 IN

## 2019-02-28 DIAGNOSIS — R59.0 LYMPHADENOPATHY, INGUINAL: ICD-10-CM

## 2019-02-28 DIAGNOSIS — L03.314 CELLULITIS OF GROIN: ICD-10-CM

## 2019-02-28 DIAGNOSIS — L03.314 CELLULITIS OF GROIN: Primary | ICD-10-CM

## 2019-02-28 PROBLEM — L03.90 CELLULITIS: Status: ACTIVE | Noted: 2019-02-28

## 2019-02-28 LAB
ANION GAP SERPL CALCULATED.3IONS-SCNC: 10 MMOL/L (ref 7–19)
BASOPHILS ABSOLUTE: 0 K/UL (ref 0–0.2)
BASOPHILS RELATIVE PERCENT: 0.2 % (ref 0–1)
BUN BLDV-MCNC: 10 MG/DL (ref 8–23)
CALCIUM SERPL-MCNC: 9.8 MG/DL (ref 8.8–10.2)
CHLORIDE BLD-SCNC: 99 MMOL/L (ref 98–111)
CO2: 30 MMOL/L (ref 22–29)
CREAT SERPL-MCNC: 0.5 MG/DL (ref 0.5–0.9)
EOSINOPHILS ABSOLUTE: 0.1 K/UL (ref 0–0.6)
EOSINOPHILS RELATIVE PERCENT: 0.7 % (ref 0–5)
GFR NON-AFRICAN AMERICAN: >60
GLUCOSE BLD-MCNC: 97 MG/DL (ref 74–109)
HCT VFR BLD CALC: 44.4 % (ref 37–47)
HEMOGLOBIN: 14.9 G/DL (ref 12–16)
LYMPHOCYTES ABSOLUTE: 4.4 K/UL (ref 1.1–4.5)
LYMPHOCYTES RELATIVE PERCENT: 36.2 % (ref 20–40)
MCH RBC QN AUTO: 28.8 PG (ref 27–31)
MCHC RBC AUTO-ENTMCNC: 33.6 G/DL (ref 33–37)
MCV RBC AUTO: 85.7 FL (ref 81–99)
MONOCYTES ABSOLUTE: 1.2 K/UL (ref 0–0.9)
MONOCYTES RELATIVE PERCENT: 9.6 % (ref 0–10)
NEUTROPHILS ABSOLUTE: 6.5 K/UL (ref 1.5–7.5)
NEUTROPHILS RELATIVE PERCENT: 53.1 % (ref 50–65)
PDW BLD-RTO: 12.2 % (ref 11.5–14.5)
PLATELET # BLD: 178 K/UL (ref 130–400)
PMV BLD AUTO: 9.8 FL (ref 9.4–12.3)
POTASSIUM SERPL-SCNC: 4 MMOL/L (ref 3.5–5)
RBC # BLD: 5.18 M/UL (ref 4.2–5.4)
SODIUM BLD-SCNC: 139 MMOL/L (ref 136–145)
WBC # BLD: 12.2 K/UL (ref 4.8–10.8)

## 2019-02-28 PROCEDURE — 1101F PT FALLS ASSESS-DOCD LE1/YR: CPT | Performed by: SURGERY

## 2019-02-28 PROCEDURE — 4040F PNEUMOC VAC/ADMIN/RCVD: CPT | Performed by: SURGERY

## 2019-02-28 PROCEDURE — 1101F PT FALLS ASSESS-DOCD LE1/YR: CPT | Performed by: ADVANCED PRACTICE MIDWIFE

## 2019-02-28 PROCEDURE — 1036F TOBACCO NON-USER: CPT | Performed by: ADVANCED PRACTICE MIDWIFE

## 2019-02-28 PROCEDURE — 3017F COLORECTAL CA SCREEN DOC REV: CPT | Performed by: SURGERY

## 2019-02-28 PROCEDURE — G8399 PT W/DXA RESULTS DOCUMENT: HCPCS | Performed by: ADVANCED PRACTICE MIDWIFE

## 2019-02-28 PROCEDURE — 93005 ELECTROCARDIOGRAM TRACING: CPT

## 2019-02-28 PROCEDURE — G8419 CALC BMI OUT NRM PARAM NOF/U: HCPCS | Performed by: SURGERY

## 2019-02-28 PROCEDURE — 4040F PNEUMOC VAC/ADMIN/RCVD: CPT | Performed by: ADVANCED PRACTICE MIDWIFE

## 2019-02-28 PROCEDURE — 85025 COMPLETE CBC W/AUTO DIFF WBC: CPT

## 2019-02-28 PROCEDURE — 99213 OFFICE O/P EST LOW 20 MIN: CPT | Performed by: ADVANCED PRACTICE MIDWIFE

## 2019-02-28 PROCEDURE — 1036F TOBACCO NON-USER: CPT | Performed by: SURGERY

## 2019-02-28 PROCEDURE — G8427 DOCREV CUR MEDS BY ELIG CLIN: HCPCS | Performed by: ADVANCED PRACTICE MIDWIFE

## 2019-02-28 PROCEDURE — 3017F COLORECTAL CA SCREEN DOC REV: CPT | Performed by: ADVANCED PRACTICE MIDWIFE

## 2019-02-28 PROCEDURE — 99214 OFFICE O/P EST MOD 30 MIN: CPT | Performed by: SURGERY

## 2019-02-28 PROCEDURE — 36415 COLL VENOUS BLD VENIPUNCTURE: CPT

## 2019-02-28 PROCEDURE — G8482 FLU IMMUNIZE ORDER/ADMIN: HCPCS | Performed by: ADVANCED PRACTICE MIDWIFE

## 2019-02-28 PROCEDURE — 1123F ACP DISCUSS/DSCN MKR DOCD: CPT | Performed by: ADVANCED PRACTICE MIDWIFE

## 2019-02-28 PROCEDURE — G8419 CALC BMI OUT NRM PARAM NOF/U: HCPCS | Performed by: ADVANCED PRACTICE MIDWIFE

## 2019-02-28 PROCEDURE — G8427 DOCREV CUR MEDS BY ELIG CLIN: HCPCS | Performed by: SURGERY

## 2019-02-28 PROCEDURE — G8482 FLU IMMUNIZE ORDER/ADMIN: HCPCS | Performed by: SURGERY

## 2019-02-28 PROCEDURE — G8399 PT W/DXA RESULTS DOCUMENT: HCPCS | Performed by: SURGERY

## 2019-02-28 PROCEDURE — 1090F PRES/ABSN URINE INCON ASSESS: CPT | Performed by: ADVANCED PRACTICE MIDWIFE

## 2019-02-28 PROCEDURE — 80048 BASIC METABOLIC PNL TOTAL CA: CPT

## 2019-02-28 PROCEDURE — 1123F ACP DISCUSS/DSCN MKR DOCD: CPT | Performed by: SURGERY

## 2019-02-28 PROCEDURE — 1090F PRES/ABSN URINE INCON ASSESS: CPT | Performed by: SURGERY

## 2019-02-28 RX ORDER — CEPHALEXIN 500 MG/1
500 CAPSULE ORAL 4 TIMES DAILY
Qty: 28 CAPSULE | Refills: 0 | Status: SHIPPED | OUTPATIENT
Start: 2019-02-28 | End: 2019-06-06

## 2019-02-28 ASSESSMENT — ENCOUNTER SYMPTOMS
NAUSEA: 0
DIARRHEA: 0
VOMITING: 0
EYES NEGATIVE: 1
RESPIRATORY NEGATIVE: 1
BACK PAIN: 0
COLOR CHANGE: 0
ALLERGIC/IMMUNOLOGIC NEGATIVE: 1
SORE THROAT: 0
EYE PAIN: 0
CHEST TIGHTNESS: 0
GASTROINTESTINAL NEGATIVE: 1
SHORTNESS OF BREATH: 0
CONSTIPATION: 0
ABDOMINAL DISTENTION: 0
EYE REDNESS: 0
COUGH: 0
ABDOMINAL PAIN: 0

## 2019-03-01 ENCOUNTER — ANESTHESIA EVENT (OUTPATIENT)
Dept: OPERATING ROOM | Age: 74
End: 2019-03-01

## 2019-03-04 ENCOUNTER — HOSPITAL ENCOUNTER (OUTPATIENT)
Age: 74
Setting detail: SPECIMEN
Discharge: HOME | End: 2019-03-04
Payer: MEDICARE

## 2019-03-04 ENCOUNTER — HOSPITAL ENCOUNTER (OUTPATIENT)
Age: 74
Setting detail: OUTPATIENT SURGERY
Discharge: HOME | End: 2019-03-04
Attending: SURGERY | Admitting: SURGERY
Payer: MEDICARE

## 2019-03-04 ENCOUNTER — PREP FOR PROCEDURE (OUTPATIENT)
Dept: SURGERY | Age: 74
End: 2019-03-04

## 2019-03-04 ENCOUNTER — ANESTHESIA (OUTPATIENT)
Dept: OPERATING ROOM | Age: 74
End: 2019-03-04

## 2019-03-04 VITALS
RESPIRATION RATE: 18 BRPM | DIASTOLIC BLOOD PRESSURE: 76 MMHG | HEART RATE: 85 BPM | TEMPERATURE: 97.7 F | BODY MASS INDEX: 26 KG/M2 | OXYGEN SATURATION: 95 % | HEIGHT: 59 IN | WEIGHT: 129 LBS | SYSTOLIC BLOOD PRESSURE: 143 MMHG

## 2019-03-04 VITALS
DIASTOLIC BLOOD PRESSURE: 62 MMHG | RESPIRATION RATE: 5 BRPM | OXYGEN SATURATION: 97 % | SYSTOLIC BLOOD PRESSURE: 102 MMHG

## 2019-03-04 PROBLEM — R59.0 LYMPHADENOPATHY, INGUINAL: Chronic | Status: ACTIVE | Noted: 2019-02-28

## 2019-03-04 PROCEDURE — G8907 PT DOC NO EVENTS ON DISCHARG: HCPCS

## 2019-03-04 PROCEDURE — G8916 PT W IV AB GIVEN ON TIME: HCPCS

## 2019-03-04 PROCEDURE — 88323 CONSLTJ&REPRT MATRL PREP SLD: CPT

## 2019-03-04 PROCEDURE — 38500 BIOPSY/REMOVAL LYMPH NODES: CPT | Performed by: SURGERY

## 2019-03-04 PROCEDURE — 88341 IMHCHEM/IMCYTCHM EA ADD ANTB: CPT

## 2019-03-04 PROCEDURE — 88342 IMHCHEM/IMCYTCHM 1ST ANTB: CPT

## 2019-03-04 PROCEDURE — 88184 FLOWCYTOMETRY/ TC 1 MARKER: CPT

## 2019-03-04 PROCEDURE — 88305 TISSUE EXAM BY PATHOLOGIST: CPT

## 2019-03-04 PROCEDURE — 38500 BIOPSY/REMOVAL LYMPH NODES: CPT

## 2019-03-04 PROCEDURE — 88185 FLOWCYTOMETRY/TC ADD-ON: CPT

## 2019-03-04 RX ORDER — MORPHINE SULFATE 10 MG/ML
2 INJECTION, SOLUTION INTRAMUSCULAR; INTRAVENOUS EVERY 5 MIN PRN
Status: DISCONTINUED | OUTPATIENT
Start: 2019-03-04 | End: 2019-03-04 | Stop reason: HOSPADM

## 2019-03-04 RX ORDER — PROMETHAZINE HYDROCHLORIDE 25 MG/ML
6.25 INJECTION, SOLUTION INTRAMUSCULAR; INTRAVENOUS
Status: DISCONTINUED | OUTPATIENT
Start: 2019-03-04 | End: 2019-03-04 | Stop reason: HOSPADM

## 2019-03-04 RX ORDER — SODIUM CHLORIDE, SODIUM LACTATE, POTASSIUM CHLORIDE, CALCIUM CHLORIDE 600; 310; 30; 20 MG/100ML; MG/100ML; MG/100ML; MG/100ML
INJECTION, SOLUTION INTRAVENOUS CONTINUOUS
Status: DISCONTINUED | OUTPATIENT
Start: 2019-03-04 | End: 2019-03-04 | Stop reason: HOSPADM

## 2019-03-04 RX ORDER — HYDRALAZINE HYDROCHLORIDE 20 MG/ML
5 INJECTION INTRAMUSCULAR; INTRAVENOUS EVERY 10 MIN PRN
Status: DISCONTINUED | OUTPATIENT
Start: 2019-03-04 | End: 2019-03-04 | Stop reason: HOSPADM

## 2019-03-04 RX ORDER — LABETALOL HYDROCHLORIDE 5 MG/ML
5 INJECTION, SOLUTION INTRAVENOUS EVERY 10 MIN PRN
Status: DISCONTINUED | OUTPATIENT
Start: 2019-03-04 | End: 2019-03-04 | Stop reason: HOSPADM

## 2019-03-04 RX ORDER — DIPHENHYDRAMINE HYDROCHLORIDE 50 MG/ML
12.5 INJECTION INTRAMUSCULAR; INTRAVENOUS
Status: DISCONTINUED | OUTPATIENT
Start: 2019-03-04 | End: 2019-03-04 | Stop reason: HOSPADM

## 2019-03-04 RX ORDER — PROPOFOL 10 MG/ML
INJECTION, EMULSION INTRAVENOUS PRN
Status: DISCONTINUED | OUTPATIENT
Start: 2019-03-04 | End: 2019-03-04 | Stop reason: SDUPTHER

## 2019-03-04 RX ORDER — MORPHINE SULFATE 10 MG/ML
4 INJECTION, SOLUTION INTRAMUSCULAR; INTRAVENOUS EVERY 5 MIN PRN
Status: DISCONTINUED | OUTPATIENT
Start: 2019-03-04 | End: 2019-03-04 | Stop reason: HOSPADM

## 2019-03-04 RX ORDER — HYDROMORPHONE HCL 110MG/55ML
0.5 PATIENT CONTROLLED ANALGESIA SYRINGE INTRAVENOUS EVERY 5 MIN PRN
Status: DISCONTINUED | OUTPATIENT
Start: 2019-03-04 | End: 2019-03-04 | Stop reason: HOSPADM

## 2019-03-04 RX ORDER — ONDANSETRON 2 MG/ML
INJECTION INTRAMUSCULAR; INTRAVENOUS PRN
Status: DISCONTINUED | OUTPATIENT
Start: 2019-03-04 | End: 2019-03-04 | Stop reason: SDUPTHER

## 2019-03-04 RX ORDER — DEXAMETHASONE SODIUM PHOSPHATE 4 MG/ML
INJECTION, SOLUTION INTRA-ARTICULAR; INTRALESIONAL; INTRAMUSCULAR; INTRAVENOUS; SOFT TISSUE PRN
Status: DISCONTINUED | OUTPATIENT
Start: 2019-03-04 | End: 2019-03-04 | Stop reason: SDUPTHER

## 2019-03-04 RX ORDER — ENALAPRILAT 2.5 MG/2ML
1.25 INJECTION INTRAVENOUS
Status: DISCONTINUED | OUTPATIENT
Start: 2019-03-04 | End: 2019-03-04 | Stop reason: HOSPADM

## 2019-03-04 RX ORDER — BUPIVACAINE HYDROCHLORIDE 2.5 MG/ML
INJECTION, SOLUTION EPIDURAL; INFILTRATION; INTRACAUDAL PRN
Status: DISCONTINUED | OUTPATIENT
Start: 2019-03-04 | End: 2019-03-04 | Stop reason: ALTCHOICE

## 2019-03-04 RX ORDER — FENTANYL CITRATE 50 UG/ML
INJECTION, SOLUTION INTRAMUSCULAR; INTRAVENOUS PRN
Status: DISCONTINUED | OUTPATIENT
Start: 2019-03-04 | End: 2019-03-04 | Stop reason: SDUPTHER

## 2019-03-04 RX ORDER — CEFAZOLIN SODIUM 1 G/3ML
INJECTION, POWDER, FOR SOLUTION INTRAMUSCULAR; INTRAVENOUS PRN
Status: DISCONTINUED | OUTPATIENT
Start: 2019-03-04 | End: 2019-03-04 | Stop reason: SDUPTHER

## 2019-03-04 RX ORDER — MEPERIDINE HYDROCHLORIDE 25 MG/ML
12.5 INJECTION INTRAMUSCULAR; INTRAVENOUS; SUBCUTANEOUS EVERY 5 MIN PRN
Status: DISCONTINUED | OUTPATIENT
Start: 2019-03-04 | End: 2019-03-04 | Stop reason: HOSPADM

## 2019-03-04 RX ORDER — METOCLOPRAMIDE HYDROCHLORIDE 5 MG/ML
10 INJECTION INTRAMUSCULAR; INTRAVENOUS
Status: DISCONTINUED | OUTPATIENT
Start: 2019-03-04 | End: 2019-03-04 | Stop reason: HOSPADM

## 2019-03-04 RX ORDER — HYDROMORPHONE HCL 110MG/55ML
0.25 PATIENT CONTROLLED ANALGESIA SYRINGE INTRAVENOUS EVERY 5 MIN PRN
Status: DISCONTINUED | OUTPATIENT
Start: 2019-03-04 | End: 2019-03-04 | Stop reason: HOSPADM

## 2019-03-04 RX ORDER — LIDOCAINE HYDROCHLORIDE 10 MG/ML
INJECTION, SOLUTION INFILTRATION; PERINEURAL PRN
Status: DISCONTINUED | OUTPATIENT
Start: 2019-03-04 | End: 2019-03-04 | Stop reason: SDUPTHER

## 2019-03-04 RX ADMIN — LIDOCAINE HYDROCHLORIDE 50 MG: 10 INJECTION, SOLUTION INFILTRATION; PERINEURAL at 11:00

## 2019-03-04 RX ADMIN — PROPOFOL 110 MG: 10 INJECTION, EMULSION INTRAVENOUS at 11:00

## 2019-03-04 RX ADMIN — SODIUM CHLORIDE, SODIUM LACTATE, POTASSIUM CHLORIDE, CALCIUM CHLORIDE: 600; 310; 30; 20 INJECTION, SOLUTION INTRAVENOUS at 09:23

## 2019-03-04 RX ADMIN — DEXAMETHASONE SODIUM PHOSPHATE 4 MG: 4 INJECTION, SOLUTION INTRA-ARTICULAR; INTRALESIONAL; INTRAMUSCULAR; INTRAVENOUS; SOFT TISSUE at 11:05

## 2019-03-04 RX ADMIN — FENTANYL CITRATE 50 MCG: 50 INJECTION, SOLUTION INTRAMUSCULAR; INTRAVENOUS at 11:16

## 2019-03-04 RX ADMIN — CEFAZOLIN SODIUM 1000 MG: 1 INJECTION, POWDER, FOR SOLUTION INTRAMUSCULAR; INTRAVENOUS at 10:58

## 2019-03-04 RX ADMIN — FENTANYL CITRATE 50 MCG: 50 INJECTION, SOLUTION INTRAMUSCULAR; INTRAVENOUS at 11:00

## 2019-03-04 RX ADMIN — ONDANSETRON 4 MG: 2 INJECTION INTRAMUSCULAR; INTRAVENOUS at 11:05

## 2019-03-04 RX ADMIN — SODIUM CHLORIDE, SODIUM LACTATE, POTASSIUM CHLORIDE, CALCIUM CHLORIDE: 600; 310; 30; 20 INJECTION, SOLUTION INTRAVENOUS at 10:57

## 2019-03-12 ENCOUNTER — TELEPHONE (OUTPATIENT)
Dept: SURGERY | Age: 74
End: 2019-03-12

## 2019-03-12 DIAGNOSIS — C85.15 B-CELL LYMPHOMA OF LYMPH NODES OF INGUINAL REGION, UNSPECIFIED B-CELL LYMPHOMA TYPE (HCC): Primary | ICD-10-CM

## 2019-03-14 ENCOUNTER — TELEPHONE (OUTPATIENT)
Dept: SURGERY | Age: 74
End: 2019-03-14

## 2019-03-19 ENCOUNTER — OFFICE VISIT (OUTPATIENT)
Dept: SURGERY | Age: 74
End: 2019-03-19

## 2019-03-19 VITALS
OXYGEN SATURATION: 99 % | HEART RATE: 96 BPM | HEIGHT: 59 IN | BODY MASS INDEX: 25.88 KG/M2 | TEMPERATURE: 98 F | WEIGHT: 128.4 LBS

## 2019-03-19 DIAGNOSIS — C83.35 DIFFUSE LARGE B-CELL LYMPHOMA OF LYMPH NODES OF INGUINAL REGION (HCC): Primary | ICD-10-CM

## 2019-03-19 PROCEDURE — 99024 POSTOP FOLLOW-UP VISIT: CPT | Performed by: PHYSICIAN ASSISTANT

## 2019-04-08 ENCOUNTER — HOSPITAL ENCOUNTER (OUTPATIENT)
Dept: CT IMAGING | Age: 74
Discharge: HOME | End: 2019-04-08
Payer: MEDICARE

## 2019-04-08 DIAGNOSIS — C85.15 UNSPECIFIED B-CELL LYMPHOMA, LYMPH NODES OF INGUINAL REGION AND LOWER LIMB (HCC): ICD-10-CM

## 2019-04-08 DIAGNOSIS — C85.15 B-CELL LYMPHOMA OF LYMPH NODES OF INGUINAL REGION, UNSPECIFIED B-CELL LYMPHOMA TYPE (HCC): ICD-10-CM

## 2019-04-08 LAB
GFR NON-AFRICAN AMERICAN: >60
PERFORMED ON: NORMAL
POC CREATININE: 0.5 MG/DL (ref 0.3–1.3)
POC SAMPLE TYPE: NORMAL

## 2019-04-08 PROCEDURE — 74177 CT ABD & PELVIS W/CONTRAST: CPT

## 2019-04-08 PROCEDURE — 82565 ASSAY OF CREATININE: CPT

## 2019-04-08 PROCEDURE — 71260 CT THORAX DX C+: CPT

## 2019-04-08 PROCEDURE — 6360000004 HC RX CONTRAST MEDICATION: Performed by: INTERNAL MEDICINE

## 2019-04-08 RX ADMIN — IOPAMIDOL 90 ML: 755 INJECTION, SOLUTION INTRAVENOUS at 11:06

## 2019-04-09 ENCOUNTER — HOSPITAL ENCOUNTER (OUTPATIENT)
Dept: NUCLEAR MEDICINE | Age: 74
Discharge: HOME | End: 2019-04-11
Payer: MEDICARE

## 2019-04-09 DIAGNOSIS — C85.15 B-CELL LYMPHOMA OF LYMPH NODES OF INGUINAL REGION, UNSPECIFIED B-CELL LYMPHOMA TYPE (HCC): ICD-10-CM

## 2019-04-09 LAB
GLUCOSE BLD-MCNC: 83 MG/DL (ref 70–99)
PERFORMED ON: NORMAL

## 2019-04-09 PROCEDURE — 3430000000 HC RX DIAGNOSTIC RADIOPHARMACEUTICAL: Performed by: INTERNAL MEDICINE

## 2019-04-09 PROCEDURE — 82948 REAGENT STRIP/BLOOD GLUCOSE: CPT

## 2019-04-09 PROCEDURE — 78815 PET IMAGE W/CT SKULL-THIGH: CPT

## 2019-04-09 PROCEDURE — A9552 F18 FDG: HCPCS | Performed by: INTERNAL MEDICINE

## 2019-04-09 RX ORDER — FLUDEOXYGLUCOSE F 18 200 MCI/ML
10 INJECTION, SOLUTION INTRAVENOUS
Status: COMPLETED | OUTPATIENT
Start: 2019-04-09 | End: 2019-04-09

## 2019-04-09 RX ADMIN — FLUDEOXYGLUCOSE F 18 10 MILLICURIE: 200 INJECTION, SOLUTION INTRAVENOUS at 11:46

## 2019-05-31 DIAGNOSIS — I10 ESSENTIAL HYPERTENSION: ICD-10-CM

## 2019-05-31 DIAGNOSIS — R73.01 IFG (IMPAIRED FASTING GLUCOSE): ICD-10-CM

## 2019-05-31 DIAGNOSIS — E78.00 PURE HYPERCHOLESTEROLEMIA: ICD-10-CM

## 2019-05-31 DIAGNOSIS — M81.6 LOCALIZED OSTEOPOROSIS WITHOUT CURRENT PATHOLOGICAL FRACTURE: ICD-10-CM

## 2019-05-31 LAB
ALBUMIN SERPL-MCNC: 4.2 G/DL (ref 3.5–5.2)
ALP BLD-CCNC: 108 U/L (ref 35–104)
ALT SERPL-CCNC: 34 U/L (ref 5–33)
ANION GAP SERPL CALCULATED.3IONS-SCNC: 17 MMOL/L (ref 7–19)
AST SERPL-CCNC: 29 U/L (ref 5–32)
BASOPHILS ABSOLUTE: 0 K/UL (ref 0–0.2)
BASOPHILS RELATIVE PERCENT: 0.3 % (ref 0–1)
BILIRUB SERPL-MCNC: 0.4 MG/DL (ref 0.2–1.2)
BILIRUBIN URINE: NEGATIVE
BLOOD, URINE: NEGATIVE
BUN BLDV-MCNC: 9 MG/DL (ref 8–23)
CALCIUM SERPL-MCNC: 10.1 MG/DL (ref 8.8–10.2)
CHLORIDE BLD-SCNC: 96 MMOL/L (ref 98–111)
CHOLESTEROL, TOTAL: 154 MG/DL (ref 160–199)
CLARITY: CLEAR
CO2: 26 MMOL/L (ref 22–29)
COLOR: YELLOW
CREAT SERPL-MCNC: <0.5 MG/DL (ref 0.5–0.9)
EOSINOPHILS ABSOLUTE: 0.1 K/UL (ref 0–0.6)
EOSINOPHILS RELATIVE PERCENT: 1 % (ref 0–5)
GFR NON-AFRICAN AMERICAN: >60
GLUCOSE BLD-MCNC: 103 MG/DL (ref 74–109)
GLUCOSE URINE: NEGATIVE MG/DL
HBA1C MFR BLD: 5.5 % (ref 4–6)
HCT VFR BLD CALC: 41.2 % (ref 37–47)
HDLC SERPL-MCNC: 51 MG/DL (ref 65–121)
HEMOGLOBIN: 13.7 G/DL (ref 12–16)
KETONES, URINE: NEGATIVE MG/DL
LDL CHOLESTEROL CALCULATED: 85 MG/DL
LEUKOCYTE ESTERASE, URINE: NEGATIVE
LYMPHOCYTES ABSOLUTE: 2.8 K/UL (ref 1.1–4.5)
LYMPHOCYTES RELATIVE PERCENT: 40.5 % (ref 20–40)
MCH RBC QN AUTO: 28.8 PG (ref 27–31)
MCHC RBC AUTO-ENTMCNC: 33.3 G/DL (ref 33–37)
MCV RBC AUTO: 86.6 FL (ref 81–99)
MONOCYTES ABSOLUTE: 0.5 K/UL (ref 0–0.9)
MONOCYTES RELATIVE PERCENT: 7.4 % (ref 0–10)
NEUTROPHILS ABSOLUTE: 3.6 K/UL (ref 1.5–7.5)
NEUTROPHILS RELATIVE PERCENT: 50.7 % (ref 50–65)
NITRITE, URINE: NEGATIVE
PDW BLD-RTO: 11.9 % (ref 11.5–14.5)
PH UA: 7 (ref 5–8)
PLATELET # BLD: 215 K/UL (ref 130–400)
PMV BLD AUTO: 10 FL (ref 9.4–12.3)
POTASSIUM SERPL-SCNC: 3.6 MMOL/L (ref 3.5–5)
PROTEIN UA: NEGATIVE MG/DL
RBC # BLD: 4.76 M/UL (ref 4.2–5.4)
SODIUM BLD-SCNC: 139 MMOL/L (ref 136–145)
SPECIFIC GRAVITY UA: 1 (ref 1–1.03)
TOTAL PROTEIN: 7.4 G/DL (ref 6.6–8.7)
TRIGL SERPL-MCNC: 91 MG/DL (ref 0–149)
TSH SERPL DL<=0.05 MIU/L-ACNC: 2.95 UIU/ML (ref 0.27–4.2)
UROBILINOGEN, URINE: 0.2 E.U./DL
VITAMIN D 25-HYDROXY: 41.8 NG/ML
WBC # BLD: 7 K/UL (ref 4.8–10.8)

## 2019-06-06 ENCOUNTER — OFFICE VISIT (OUTPATIENT)
Dept: INTERNAL MEDICINE | Age: 74
End: 2019-06-06
Payer: MEDICARE

## 2019-06-06 VITALS
HEART RATE: 80 BPM | BODY MASS INDEX: 25.8 KG/M2 | DIASTOLIC BLOOD PRESSURE: 82 MMHG | OXYGEN SATURATION: 97 % | SYSTOLIC BLOOD PRESSURE: 130 MMHG | WEIGHT: 128 LBS | HEIGHT: 59 IN

## 2019-06-06 DIAGNOSIS — M81.6 LOCALIZED OSTEOPOROSIS WITHOUT CURRENT PATHOLOGICAL FRACTURE: ICD-10-CM

## 2019-06-06 DIAGNOSIS — R73.01 IFG (IMPAIRED FASTING GLUCOSE): ICD-10-CM

## 2019-06-06 DIAGNOSIS — Z12.31 SCREENING MAMMOGRAM, ENCOUNTER FOR: ICD-10-CM

## 2019-06-06 DIAGNOSIS — I10 ESSENTIAL HYPERTENSION: ICD-10-CM

## 2019-06-06 DIAGNOSIS — Z00.00 MEDICARE ANNUAL WELLNESS VISIT, SUBSEQUENT: Primary | ICD-10-CM

## 2019-06-06 DIAGNOSIS — E78.00 PURE HYPERCHOLESTEROLEMIA: ICD-10-CM

## 2019-06-06 DIAGNOSIS — C83.00 LYMPHOMA, SMALL LYMPHOCYTIC (HCC): ICD-10-CM

## 2019-06-06 PROBLEM — L03.90 CELLULITIS: Status: RESOLVED | Noted: 2019-02-28 | Resolved: 2019-06-06

## 2019-06-06 PROCEDURE — 4040F PNEUMOC VAC/ADMIN/RCVD: CPT | Performed by: INTERNAL MEDICINE

## 2019-06-06 PROCEDURE — G8419 CALC BMI OUT NRM PARAM NOF/U: HCPCS | Performed by: INTERNAL MEDICINE

## 2019-06-06 PROCEDURE — G8427 DOCREV CUR MEDS BY ELIG CLIN: HCPCS | Performed by: INTERNAL MEDICINE

## 2019-06-06 PROCEDURE — 1090F PRES/ABSN URINE INCON ASSESS: CPT | Performed by: INTERNAL MEDICINE

## 2019-06-06 PROCEDURE — 1036F TOBACCO NON-USER: CPT | Performed by: INTERNAL MEDICINE

## 2019-06-06 PROCEDURE — 1123F ACP DISCUSS/DSCN MKR DOCD: CPT | Performed by: INTERNAL MEDICINE

## 2019-06-06 PROCEDURE — 99214 OFFICE O/P EST MOD 30 MIN: CPT | Performed by: INTERNAL MEDICINE

## 2019-06-06 PROCEDURE — 3017F COLORECTAL CA SCREEN DOC REV: CPT | Performed by: INTERNAL MEDICINE

## 2019-06-06 PROCEDURE — G8399 PT W/DXA RESULTS DOCUMENT: HCPCS | Performed by: INTERNAL MEDICINE

## 2019-06-06 PROCEDURE — G0439 PPPS, SUBSEQ VISIT: HCPCS | Performed by: INTERNAL MEDICINE

## 2019-06-06 RX ORDER — SIMVASTATIN 40 MG
40 TABLET ORAL NIGHTLY
Qty: 90 TABLET | Refills: 2 | Status: SHIPPED | OUTPATIENT
Start: 2019-06-06 | End: 2019-12-12 | Stop reason: SDUPTHER

## 2019-06-06 RX ORDER — LISINOPRIL AND HYDROCHLOROTHIAZIDE 20; 12.5 MG/1; MG/1
TABLET ORAL
Qty: 90 TABLET | Refills: 2 | Status: SHIPPED | OUTPATIENT
Start: 2019-06-06 | End: 2019-12-12 | Stop reason: SDUPTHER

## 2019-06-06 RX ORDER — CALCITONIN SALMON 200 [IU]/.09ML
1 SPRAY, METERED NASAL DAILY
Qty: 1 BOTTLE | Refills: 3 | Status: SHIPPED | OUTPATIENT
Start: 2019-06-06 | End: 2019-08-07 | Stop reason: SDUPTHER

## 2019-06-06 RX ORDER — ALENDRONATE SODIUM 70 MG/1
70 TABLET ORAL
Qty: 12 TABLET | Refills: 3 | Status: SHIPPED | OUTPATIENT
Start: 2019-06-06 | End: 2019-08-08 | Stop reason: SDUPTHER

## 2019-06-06 ASSESSMENT — ENCOUNTER SYMPTOMS
SINUS PRESSURE: 0
NAUSEA: 0
CONSTIPATION: 0
WHEEZING: 0
BLOOD IN STOOL: 0
VOICE CHANGE: 0
COLOR CHANGE: 0
VOMITING: 0
COUGH: 0
DIARRHEA: 0
EYE REDNESS: 0
SORE THROAT: 0
TROUBLE SWALLOWING: 0
EYE PAIN: 0
CHEST TIGHTNESS: 0
ABDOMINAL PAIN: 0

## 2019-06-06 ASSESSMENT — LIFESTYLE VARIABLES: HOW OFTEN DO YOU HAVE A DRINK CONTAINING ALCOHOL: 0

## 2019-06-06 ASSESSMENT — PATIENT HEALTH QUESTIONNAIRE - PHQ9
SUM OF ALL RESPONSES TO PHQ QUESTIONS 1-9: 0
SUM OF ALL RESPONSES TO PHQ QUESTIONS 1-9: 0

## 2019-06-06 ASSESSMENT — ANXIETY QUESTIONNAIRES: GAD7 TOTAL SCORE: 0

## 2019-06-06 NOTE — PROGRESS NOTES
Chief Complaint:   Primitivo Cox is a 76 y.o. female who presents forcomplete physical exam.    History of Present Illness:      Patient is here for  [address] VISIT     CARE TEAM:    Dr Taylor Guzman opthalmology  Dr Yazmin Armstrong hematology/ oncology  Dr Ani Almonte surgery    PT Ewell Kussmaul    _____________________________________________________________________    Pt presents today for follow-up and management of following chronic medical conditions:    Essential hypertension- States her blood pressure has been in good range/she checks it regularly at home     Pure hypercholesterolemia- she has been taking simvastatin 40 mg daily     IFG (impaired fasting glucose)- she has been watching her diet     Localized osteoporosis without current pathological fracture- has been taking Fosamax weekly and Miacalcin spray    Diagnosed with B cell lymhoma  Last visit with me 12/18 had noticed bulge in her rt groing area  Seen surgery  bx dine  Referred to dr Yazmin Armstrong    Patient Active Problem List    Diagnosis Date Noted    Lymphoma, small lymphocytic ([address] 06/06/2019     Stage 4( bone marrow involvement)/ 13q deletion      Lymphadenopathy, inguinal 02/28/2019    Localized osteoporosis without current pathological fracture 09/09/2017     3/15 Lspine -3.3; hip-0.9   2017 Lspine -3.6/ fem neck -2.1      Essential hypertension 09/09/2017    Pure hypercholesterolemia 09/09/2017    IFG (impaired fasting glucose) 09/09/2017    Breast density 03/22/2016       Past Medical History:   Diagnosis Date    Hyperlipidemia     Hypertension     Osteoporosis           Past Surgical History:   Procedure Laterality Date    CHOLECYSTECTOMY      COLONOSCOPY      HYSTERECTOMY, TOTAL ABDOMINAL      LYMPH NODE Physical activity:     Days per week: None     Minutes per session: None    Stress: None   Relationships    Social connections:     Talks on phone: None     Gets together: None     Attends Sabianism service: None     Active member of club or organization: None     Attends meetings of clubs or organizations: None     Relationship status: None    Intimate partner violence:     Fear of current or ex partner: None     Emotionally abused: None     Physically abused: None     Forced sexual activity: None   Other Topics Concern    None   Social History Narrative    None     Family History   Problem Relation Age of Onset    Cancer Mother 40        lung CA - was not a smoker       Past Surgical History:   Procedure Laterality Date    CHOLECYSTECTOMY      COLONOSCOPY      HYSTERECTOMY, TOTAL ABDOMINAL      LYMPH NODE BIOPSY Right 3/4/2019    EXCISION OF SUPERFICIAL LYMPH NODE, RIGHT GROIN performed by Malik Contreras DO at [facility]         Lab Review   Orders Only on 05/31/2019   Component Date Value    Vit D, 25-Hydroxy 05/31/2019 41.8     TSH 05/31/2019 2.950     Color, UA 05/31/2019 YELLOW     Clarity, UA 05/31/2019 Clear     Glucose, Ur 05/31/2019 Negative     Bilirubin Urine 05/31/2019 Negative     Ketones, Urine 05/31/2019 Negative     Specific Gravity, UA 05/31/2019 1.005     Blood, Urine 05/31/2019 Negative     pH, UA 05/31/2019 7.0     Protein, UA 05/31/2019 Negative     Urobilinogen, Urine 05/31/2019 0.2     Nitrite, Urine 05/31/2019 Negative     Leukocyte Esterase, Urine 05/31/2019 Negative     Cholesterol, Total 05/31/2019 154*    Triglycerides 05/31/2019 91     HDL 05/31/2019 51*    LDL Calculated 05/31/2019 85     Hemoglobin A1C 05/31/2019 5.5     Sodium 05/31/2019 139     Potassium 05/31/2019 3.6     Chloride 05/31/2019 96*    CO2 05/31/2019 26     Anion Gap 05/31/2019 17     Glucose 05/31/2019 103     BUN 05/31/2019 9     CREATININE 05/31/2019 <0.5     GFR Non- American 05/31/2019 >60     Calcium 05/31/2019 10.1     Total Protein 05/31/2019 7.4     Alb 05/31/2019 4.2     Total Bilirubin 05/31/2019 0.4     Alkaline Phosphatase 05/31/2019 108*    ALT 05/31/2019 34*    AST 05/31/2019 29     WBC 05/31/2019 7.0     RBC 05/31/2019 4.76     Hemoglobin 05/31/2019 13.7     Hematocrit 05/31/2019 41.2     MCV 05/31/2019 86.6     MCH 05/31/2019 28.8     MCHC 05/31/2019 33.3     RDW 05/31/2019 11.9     Platelets 38/09/3011 215     MPV 05/31/2019 10.0     Neutrophils % 05/31/2019 50.7     Lymphocytes % 05/31/2019 40.5*    Monocytes % 05/31/2019 7.4     Eosinophils % 05/31/2019 1.0     Basophils % 05/31/2019 0.3     Neutrophils # 05/31/2019 3.6     Lymphocytes # 05/31/2019 2.8     Monocytes # 05/31/2019 0.50     Eosinophils # 05/31/2019 0.10     Basophils # 05/31/2019 0.00    Hospital Outpatient Visit on 04/09/2019   Component Date Value    POC Glucose 04/09/2019 83     Performed on 04/09/2019 [facility] Outpatient Visit on 04/08/2019   Component Date Value    POC Creatinine 04/08/2019 0.5     GFR Non- 04/08/2019 >60     Sample Type 04/08/2019 Unspecified     Performed on 04/08/2019 [facility]          Review of Systems   Constitutional: Negative for chills, fatigue and fever. HENT: Negative for congestion, ear pain, postnasal drip, sinus pressure, sore throat, trouble swallowing and voice change. Eyes: Negative for pain, redness and visual disturbance. Respiratory: Negative for cough, chest tightness and wheezing. Cardiovascular: Negative for chest pain, palpitations and leg swelling. Gastrointestinal: Negative for abdominal pain, blood in stool, constipation, diarrhea, nausea and vomiting. Endocrine: Negative for polydipsia and polyuria. Genitourinary: Negative for dysuria, enuresis, flank pain, frequency and urgency. Musculoskeletal: Negative for arthralgias, gait problem and joint swelling.    Skin: Negative for color change and rash. Neurological: Negative for dizziness, tremors, syncope, facial asymmetry, speech difficulty, weakness, numbness and headaches. Psychiatric/Behavioral: Negative for agitation, behavioral problems, confusion, sleep disturbance and suicidal ideas. The patient is not nervous/anxious. Vitals:    06/06/19 1340 06/06/19 1347   BP: (!) 146/78 130/82   Site: Left Upper Arm Right Upper Arm   Pulse: 80    SpO2: 97%    Weight: 128 lb (58.1 kg)    Height: 4' 11\" (1.499 m)       Wt Readings from Last 3 Encounters:   06/06/19 128 lb (58.1 kg)   03/19/19 128 lb 6.4 oz (58.2 kg)   02/28/19 129 lb (58.5 kg)   Body mass index is 25.85 kg/m². BP Readings from Last 3 Encounters:   06/06/19 130/82   03/04/19 102/62   03/04/19 (!) 143/76       Physical Exam   Constitutional: She is oriented to person, place, and time. She appears well-developed and well-nourished. HENT:   Head: Normocephalic and atraumatic. Right Ear: External ear normal.   Left Ear: External ear normal.   Mouth/Throat: Oropharynx is clear and moist.   Eyes: Pupils are equal, round, and reactive to light. Conjunctivae are normal. No scleral icterus. Neck: Normal range of motion. Neck supple. No JVD present. No thyromegaly present. Cardiovascular: Normal rate, regular rhythm and normal heart sounds. No murmur heard. Pulmonary/Chest: Effort normal and breath sounds normal. No respiratory distress. She has no wheezes. She exhibits no tenderness. Abdominal: Soft. Bowel sounds are normal. She exhibits no mass. There is no tenderness. Rt groin/ lower abd lymph node enlargement   Musculoskeletal: Normal range of motion. She exhibits no edema or tenderness. Lymphadenopathy:     She has no cervical adenopathy. Neurological: She is alert and oriented to person, place, and time. She has normal reflexes. No cranial nerve deficit. Coordination normal.   Skin: Skin is warm and dry. No rash noted. No erythema.    Psychiatric: She has a normal mood and affect. Her behavior is normal.   Breast exam  Bilateral breast exam- symmetric, no nodules, no lymphadenopathy, no nipple discharge              ASSESSMENT/PLAN  MEDICARE WELLNESS SCREENING/ MAINTENANCE:  1:MAMMOGRAM- due, schedule  PAP na  BONE DENSITY 2017 repeat  2-3 years- due- schedule  2. SCREENING CSCOPE 2009, repeat 10 years due - refuses- agrees to cologuard  schedule  3. CARDIOVASCULAR SCREENING- LIPID PANEL DONE  4. DIABETES SCREEN DONE - FBS =ABOVE LABS  5. PNEUMONIA VACCINATION= she has completed the series  6. INFLUENZA VACCINATION: TO BE DONE IN FALL - completed  7. HEP B VACCINATION- PT DECLINES  8. HIV/ HEP SCREEN refuses        HEALTH PLAN:  1. HEALTHY DIET: Low in saturated fats, balanced diet  2. EXERCISE minimal 30 minutes ×5 days per week  3.  FALL RISK SCREEN REVIEWED; NO INCREASED FALL RISK ON EXAM        Fall Risk:  Timed Up and Go Test > 12 seconds?: no  2 or more falls in past year?: no  Fall with injury in past year?: no    Depression:  PHQ-2 Score: 0    Cognitive:  Clock Drawing Test (CDT) Score: Normal    ______________________________________________________________________    Management plan for chronic medical conditions:    Essential hypertension- blood pressure well controlled, she will continue her lisinopril 20/12.5 daily     Pure hypercholesterolemia- LDL in good range, 85(91)(98);  continue simvastatin 40 mg at this time     IFG (impaired fasting glucose)= her a1c better 5.5 ( 5.6) (5.9 )( 5.7),  Strict  lower carb diet/ avoid wt gain     Localized osteoporosis without current pathological fracture=   Repeat bd needed  Schedule  she will continue Fosamax weekly and Miacalcin nose spray, she had reviewed prolia  side effects online and states that she will never take this medication    Vit D level good range at 41( 41)- cont 1000 iu daily    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

## 2019-06-13 ENCOUNTER — HOSPITAL ENCOUNTER (OUTPATIENT)
Dept: INFUSION THERAPY | Age: 74
Discharge: HOME | End: 2019-06-13
Payer: MEDICARE

## 2019-06-13 PROCEDURE — 85025 COMPLETE CBC W/AUTO DIFF WBC: CPT

## 2019-06-18 ENCOUNTER — NURSE TRIAGE (OUTPATIENT)
Dept: OTHER | Facility: CLINIC | Age: 74
End: 2019-06-18

## 2019-06-18 ENCOUNTER — OFFICE VISIT (OUTPATIENT)
Dept: INTERNAL MEDICINE | Age: 74
End: 2019-06-18
Payer: MEDICARE

## 2019-06-18 ENCOUNTER — HOSPITAL ENCOUNTER (OUTPATIENT)
Dept: NON INVASIVE DIAGNOSTICS | Age: 74
Discharge: HOME | End: 2019-06-18
Payer: MEDICARE

## 2019-06-18 VITALS
WEIGHT: 130.2 LBS | HEART RATE: 91 BPM | SYSTOLIC BLOOD PRESSURE: 132 MMHG | OXYGEN SATURATION: 98 % | BODY MASS INDEX: 26.25 KG/M2 | DIASTOLIC BLOOD PRESSURE: 80 MMHG | HEIGHT: 59 IN

## 2019-06-18 DIAGNOSIS — M79.89 PAIN AND SWELLING OF RIGHT LOWER LEG: Primary | ICD-10-CM

## 2019-06-18 DIAGNOSIS — M79.661 PAIN AND SWELLING OF RIGHT LOWER LEG: Primary | ICD-10-CM

## 2019-06-18 DIAGNOSIS — M79.609 POPLITEAL PAIN: ICD-10-CM

## 2019-06-18 DIAGNOSIS — M79.89 PAIN AND SWELLING OF RIGHT LOWER LEG: ICD-10-CM

## 2019-06-18 DIAGNOSIS — M71.21 BAKER'S CYST OF KNEE, RIGHT: ICD-10-CM

## 2019-06-18 DIAGNOSIS — C83.00 LYMPHOMA, SMALL LYMPHOCYTIC (HCC): ICD-10-CM

## 2019-06-18 DIAGNOSIS — R59.0 LYMPHADENOPATHY, INGUINAL: ICD-10-CM

## 2019-06-18 DIAGNOSIS — M79.661 PAIN AND SWELLING OF RIGHT LOWER LEG: ICD-10-CM

## 2019-06-18 PROCEDURE — G8399 PT W/DXA RESULTS DOCUMENT: HCPCS | Performed by: INTERNAL MEDICINE

## 2019-06-18 PROCEDURE — 93971 EXTREMITY STUDY: CPT

## 2019-06-18 PROCEDURE — 1123F ACP DISCUSS/DSCN MKR DOCD: CPT | Performed by: INTERNAL MEDICINE

## 2019-06-18 PROCEDURE — 3017F COLORECTAL CA SCREEN DOC REV: CPT | Performed by: INTERNAL MEDICINE

## 2019-06-18 PROCEDURE — 1090F PRES/ABSN URINE INCON ASSESS: CPT | Performed by: INTERNAL MEDICINE

## 2019-06-18 PROCEDURE — 99214 OFFICE O/P EST MOD 30 MIN: CPT | Performed by: INTERNAL MEDICINE

## 2019-06-18 PROCEDURE — 4040F PNEUMOC VAC/ADMIN/RCVD: CPT | Performed by: INTERNAL MEDICINE

## 2019-06-18 PROCEDURE — 1036F TOBACCO NON-USER: CPT | Performed by: INTERNAL MEDICINE

## 2019-06-18 PROCEDURE — G8427 DOCREV CUR MEDS BY ELIG CLIN: HCPCS | Performed by: INTERNAL MEDICINE

## 2019-06-18 PROCEDURE — G8419 CALC BMI OUT NRM PARAM NOF/U: HCPCS | Performed by: INTERNAL MEDICINE

## 2019-06-18 RX ORDER — MELOXICAM 15 MG/1
15 TABLET ORAL DAILY PRN
Qty: 15 TABLET | Refills: 0 | Status: SHIPPED | OUTPATIENT
Start: 2019-06-18 | End: 2019-11-19

## 2019-06-18 ASSESSMENT — ENCOUNTER SYMPTOMS
CHEST TIGHTNESS: 0
SORE THROAT: 0
WHEEZING: 0
CONSTIPATION: 0
ABDOMINAL PAIN: 0
COUGH: 0

## 2019-06-18 NOTE — PROGRESS NOTES
Chief Complaint   Patient presents with    Other     swelling in right leg and foot for 3-4 weeks now getting worse     History of presenting illness:  Ny Somers is a76 y.o. female who presents today for follow up on her chronic medical conditions as noted below.     Swelling RT LE that started about 2 weeks ago and is progressively worse    Was seen by her oncologist last week but states never DW them re leg swelling  worse over last 24 hours  Hurts behind her right knee    Patient Active Problem List    Diagnosis Date Noted    Lymphoma, small lymphocytic ([facility] [address] 06/06/2019     Overview Note:     Stage 4( bone marrow involvement)/ 13q deletion      Lymphadenopathy, inguinal 02/28/2019    Localized osteoporosis without current pathological fracture 09/09/2017     Overview Note:     3/15 Lspine -3.3; hip-0.9   2017 Lspine -3.6/ fem neck -2.1      Essential hypertension 09/09/2017    Pure hypercholesterolemia 09/09/2017    IFG (impaired fasting glucose) 09/09/2017    Breast density 03/22/2016     Past Medical History:   Diagnosis Date    Hyperlipidemia     Hypertension     Osteoporosis       Past Surgical History:   Procedure Laterality Date    CHOLECYSTECTOMY      COLONOSCOPY      HYSTERECTOMY, TOTAL ABDOMINAL      LYMPH NODE BIOPSY Right 3/4/2019    EXCISION OF SUPERFICIAL LYMPH NODE, RIGHT GROIN performed by Kayla Chávez DO at [address] [facility] OR     Current Outpatient Medications   Medication Sig Dispense Refill    lisinopril-hydrochlorothiazide (PRINZIDE;ZESTORETIC) 20-12.5 MG per tablet TAKE 1 TABLET BY MOUTH ONCE DAILY 90 tablet 2    simvastatin (ZOCOR) 40 MG tablet Take 1 tablet by mouth nightly 90 tablet 2    alendronate (FOSAMAX) 70 MG tablet Take 1 tablet by mouth every 7 days 12 tablet 3    calcitonin (MIACALCIN) 200 UNIT/ACT nasal spray 1 spray by Nasal route daily 1 Bottle 3    BIOTIN PO Take by mouth      vitamin D (CHOLECALCIFEROL) 1000 UNIT TABS tablet Take 1,000 Units by mouth daily      Glucosamine-Chondroit-Vit C-Mn (GLUCOSAMINE 1500 COMPLEX PO) Take 2 tablets by mouth daily      Melatonin 10 MG TABS Take by mouth      Garlic 3156 MG CAPS Take by mouth      Calcium Carb-Cholecalciferol 600-500 MG-UNIT CAPS Take by mouth      Ascorbic Acid (VITAMIN C) 500 MG tablet Take 1,000 mg by mouth daily      Vitamins-Lipotropics (LIPOFLAVONOID PO) Take by mouth      magnesium gluconate (MAGONATE) 500 MG tablet Take 500 mg by mouth daily Indications: 0.5 tablet daily       No current facility-administered medications for this visit. No Known Allergies  Social History     Tobacco Use    Smoking status: Former Smoker     Types: Cigarettes    Smokeless tobacco: Never Used    Tobacco comment: Retired from being a  and [address] [facility] [employer]   Substance Use Topics    Alcohol use: No      Family History   Problem Relation Age of Onset    Cancer Mother 40        lung CA - was not a smoker       Review of Systems   Constitutional: Negative for chills, fatigue and fever. HENT: Negative for congestion, ear pain, nosebleeds, postnasal drip and sore throat. Respiratory: Negative for cough, chest tightness and wheezing. Cardiovascular: Negative for chest pain, palpitations and leg swelling. Gastrointestinal: Negative for abdominal pain and constipation. Genitourinary: Negative for dysuria and urgency. Musculoskeletal: Negative. Negative for arthralgias. Rt leg swelling   Skin: Negative for rash. Neurological: Negative for dizziness and headaches. Psychiatric/Behavioral: Negative. Vitals:    06/18/19 1416   BP: 132/80   Pulse: 91   SpO2: 98%   Weight: 130 lb 3.2 oz (59.1 kg)   Height: 4' 11\" (1.499 m)     Body mass index is 26.3 kg/m². Physical Exam   Constitutional: She is oriented to person, place, and time. She appears well-developed and well-nourished.    HENT:   Right Ear: External ear normal.   Left Ear: External ear normal.   Mouth/Throat: Oropharynx is clear and moist. No oropharyngeal exudate. Eyes: Pupils are equal, round, and reactive to light. Conjunctivae are normal.   Neck: Neck supple. No JVD present. No thyromegaly present. Cardiovascular: Normal rate and normal heart sounds. No murmur heard. Pulmonary/Chest: Breath sounds normal. No respiratory distress. She has no wheezes. She has no rales. She exhibits no tenderness. Abdominal: Soft. Bowel sounds are normal.   Musculoskeletal: Normal range of motion. Right lower extremity swelling  Mild, right femoral area  Mild to moderate right popliteal area and below the knee     Lymphadenopathy:     She has no cervical adenopathy. Neurological: She is oriented to person, place, and time. Skin: Skin is warm. No rash noted.        Lab Review   Orders Only on 05/31/2019   Component Date Value    Vit D, 25-Hydroxy 05/31/2019 41.8     TSH 05/31/2019 2.950     Color, UA 05/31/2019 YELLOW     Clarity, UA 05/31/2019 Clear     Glucose, Ur 05/31/2019 Negative     Bilirubin Urine 05/31/2019 Negative     Ketones, Urine 05/31/2019 Negative     Specific Gravity, UA 05/31/2019 1.005     Blood, Urine 05/31/2019 Negative     pH, UA 05/31/2019 7.0     Protein, UA 05/31/2019 Negative     Urobilinogen, Urine 05/31/2019 0.2     Nitrite, Urine 05/31/2019 Negative     Leukocyte Esterase, Urine 05/31/2019 Negative     Cholesterol, Total 05/31/2019 154*    Triglycerides 05/31/2019 91     HDL 05/31/2019 51*    LDL Calculated 05/31/2019 85     Hemoglobin A1C 05/31/2019 5.5     Sodium 05/31/2019 139     Potassium 05/31/2019 3.6     Chloride 05/31/2019 96*    CO2 05/31/2019 26     Anion Gap 05/31/2019 17     Glucose 05/31/2019 103     BUN 05/31/2019 9     CREATININE 05/31/2019 <0.5     GFR Non- 05/31/2019 >60     Calcium 05/31/2019 10.1     Total Protein 05/31/2019 7.4     Alb 05/31/2019 4.2     Total Bilirubin 05/31/2019 0.4     Alkaline Phosphatase 05/31/2019 108*    ALT 05/31/2019 34*    AST 05/31/2019 29     WBC 05/31/2019 7.0     RBC 05/31/2019 4.76     Hemoglobin 05/31/2019 13.7     Hematocrit 05/31/2019 41.2     MCV 05/31/2019 86.6     MCH 05/31/2019 28.8     MCHC 05/31/2019 33.3     RDW 05/31/2019 11.9     Platelets 87/55/4638 215     MPV 05/31/2019 10.0     Neutrophils % 05/31/2019 50.7     Lymphocytes % 05/31/2019 40.5*    Monocytes % 05/31/2019 7.4     Eosinophils % 05/31/2019 1.0     Basophils % 05/31/2019 0.3     Neutrophils # 05/31/2019 3.6     Lymphocytes # 05/31/2019 2.8     Monocytes # 05/31/2019 0.50     Eosinophils # 05/31/2019 0.10     Basophils # 05/31/2019 0.00            ASSESSMENT/PLAN:    Pain and swelling of right lower leg  Popliteal pain  -     US DUP LOWER EXTREMITY RIGHT MATHEUS - obtain today    Lymphoma, small lymphocytic ([address]  Lymphadenopathy, inguinal  Follows dr Porter Ruiz    ADDENDUM:  Results  Preliminarily ultrasound results  No DVT or SVT right lower extremity  Enlarged lymph nodes right groin  Likely Baker's cyst right popliteal space 3.9 x 6.3 cm again, this is preliminary report    DX  Baker's cyst of knee, right  Right inguinal lymphadenopathy    Suggest following  1. Anti-inflammatory Rx x2 weeks  2. Instructions with the patient regarding Baker's cyst management  3. We will send these results also to patient's oncologist Dr. Porter Ruiz since enlarged lymph nodes are mentioned with  Her know ho lymphoma/ask his opinion regarding could this be contributing to patient's right thigh area swelling              Orders Placed This Encounter   Procedures    US DUP LOWER EXTREMITY RIGHT MATHEUS     New Prescriptions    No medications on file         No follow-ups on file. Patient Instructions       Patient Education        Jaeger's Cyst: Care Instructions  Your Care Instructions    A Baker's cyst is a swelling behind the knee. It may cause pain or stiffness when you bend your knee or straighten it all the way.  Baker's cysts are also called popliteal cysts. If you have arthritis or another condition that is the cause of the Baker's cyst, your doctor may treat that condition. A Baker's cyst may go away on its own. If not, or if it is causing a lot of discomfort, your doctor may drain the fluid that has built up behind the knee. In some cases, a Baker's cyst is removed in surgery. There are things you can do at home, such as staying off your leg, to reduce the swelling and pain. Follow-up care is a key part of your treatment and safety. Be sure to make and go to all appointments, and call your doctor if you are having problems. It's also a good idea to know your test results and keep a list of the medicines you take. How can you care for yourself at home? · Rest your knee as much as possible. · Ask your doctor if you can take an over-the-counter pain medicine, such as acetaminophen (Tylenol), ibuprofen (Advil, Motrin), or naproxen (Aleve). Be safe with medicines. Read and follow all instructions on the label. · Use a cane, a crutch, a walker, or another device if you need help to get around. These can help rest your knees. · If you have an elastic bandage, make sure it is snug but not so tight that your leg is numb, tingles, or swells below the bandage. Ask your doctor if you can loosen the bandage if it is too tight. · Follow your doctor's instructions about how much weight you can put on your knee. · Stay at a healthy weight. Being overweight puts extra strain on your knee. When should you call for help? Call 911 anytime you think you may need emergency care.  For example, call if:    · You have chest pain, are short of breath, or you cough up blood.    Call your doctor now or seek immediate medical care if:    · You have new or worse pain.     · Your foot is cool or pale or changes color.     · You have tingling, weakness, or numbness in your foot or toes.     · You have signs of a blood clot in your leg (called a deep vein thrombosis), such as:  ? Pain in your calf, back of the knee, thigh, or groin. ? Redness or swelling in your leg.    Watch closely for changes in your health, and be sure to contact your doctor if:    · You do not get better as expected. Where can you learn more? Go to [email or web address] and sign in to your [employer] account. Enter H856 in the [employer] box to learn more about \"Baker's Cyst: Care Instructions. \"     If you do not have an account, please click on the \"Sign Up Now\" link. Current as of: September 20, 2018  Content Version: 12.0  © 9845-2248 [employer], [employer]. Care instructions adapted under license by [facility] ([facility]). If you have questions about a medical condition or this instruction, always ask your healthcare professional. [address] any warranty or liability for your use of this information. EMR Dragon/transcription disclaimer:Significant part of this  encounter note is electronic transcription/translationof spoken language to printed text. The electronic translation of spoken language may be erroneous, or at times, nonsensical words or phrases may be inadvertently transcribed.  Although I have reviewed the note for sucherrors, some may still exist.

## 2019-06-27 ENCOUNTER — HOSPITAL ENCOUNTER (OUTPATIENT)
Dept: WOMENS IMAGING | Age: 74
Discharge: HOME | End: 2019-06-27
Payer: MEDICARE

## 2019-06-27 DIAGNOSIS — M81.6 LOCALIZED OSTEOPOROSIS WITHOUT CURRENT PATHOLOGICAL FRACTURE: ICD-10-CM

## 2019-06-27 DIAGNOSIS — Z12.31 SCREENING MAMMOGRAM, ENCOUNTER FOR: ICD-10-CM

## 2019-06-27 PROCEDURE — 77063 BREAST TOMOSYNTHESIS BI: CPT

## 2019-06-27 PROCEDURE — 77080 DXA BONE DENSITY AXIAL: CPT

## 2019-08-07 RX ORDER — CALCITONIN SALMON 200 [IU]/.09ML
SPRAY, METERED NASAL
Qty: 4 ML | Refills: 3 | Status: SHIPPED | OUTPATIENT
Start: 2019-08-07 | End: 2019-12-12 | Stop reason: SDUPTHER

## 2019-08-08 RX ORDER — ALENDRONATE SODIUM 70 MG/1
70 TABLET ORAL
Qty: 12 TABLET | Refills: 3 | Status: SHIPPED | OUTPATIENT
Start: 2019-08-08 | End: 2019-12-12 | Stop reason: SDUPTHER

## 2019-08-08 NOTE — TELEPHONE ENCOUNTER
Orlin Berry called requesting a refill of the below medication which has been pended for you:     Requested Prescriptions     Pending Prescriptions Disp Refills    alendronate (FOSAMAX) 70 MG tablet 12 tablet 3     Sig: Take 1 tablet by mouth every 7 days       Last Appointment Date: 6/18/2019  Next Appointment Date: 12/12/2019    No Known Allergies

## 2019-08-12 NOTE — PROGRESS NOTES
no dental caries, no dysphagia  Lungs: no cough, no shortness of breath, no wheeze;   CVS: no palpitation, no chest pain, no shortness of breath;  GI: no abdominal pain, no nausea , no vomiting, no constipation;   GABE: no dysuria, frequency and urgency, no hematuria, no kidney stones;   Musculoskeletal: no joint pain, swelling , stiffness; right  lower extremity edema   endocrine: no polyuria, polydypsia, no cold or heat intolerence; Hematology/lymphatic: no easy brusing or bleeding, no hx of clotting disorder;  Right inguinal adenopathy. Dermatology: no skin rash, no eczema, no pruritis;   Psychiatry: no depression, no anxiety,no panic attacks, no suicide ideation; Neurology: no syncope, no seizures, no numbness or tingling of hands, no numbness or tingling of feet, no paresis;     PHYSICAL EXAM:    Vitals signs:  /60   Pulse 88   Ht 4' 11\" (1.499 m)   Wt 130 lb (59 kg)   LMP  (LMP Unknown)   SpO2 93%   BMI 26.26 kg/m²    Pain scale:        CONSTITUTIONAL: Alert, appropriate, no acute distress,   EYES: Non icteric, EOM intact, pupils equal round and reactive to light and accommodation. ENT: Oral mucus membranes moist, no oral pharyngeal lesions. External inspection of ears and nose are normal.   NECK: Supple, no masses. No palpable thyroid mass    CHEST/LUNGS: CTA bilaterally, normal respiratory effort   CARDIOVASCULAR: RRR, no murmurs. No lower extremity edema   ABDOMEN: soft non-tender, active bowel sounds, no hepatosplenomegaly. No palpable masses. EXTREMITIES: warm, Full ROM of all fours extremities. No focal weakness. Right lower extremity edema   SKIN: warm, dry with no rashes or lesions  LYMPH: No cervical, clavicular, axillary palpable right inguinal lymphadenopathy  NEUROLOGIC: follows commands, non focal.   PSYCH: mood and affect appropriate. Alert and oriented to time and place and person.     Relevant Lab findings:  CBC showed WBC 12, hemoglobin 14.1 platelet count

## 2019-08-13 ENCOUNTER — OFFICE VISIT (OUTPATIENT)
Dept: HEMATOLOGY | Age: 74
End: 2019-08-13
Payer: MEDICARE

## 2019-08-13 VITALS
SYSTOLIC BLOOD PRESSURE: 120 MMHG | OXYGEN SATURATION: 93 % | HEIGHT: 59 IN | DIASTOLIC BLOOD PRESSURE: 60 MMHG | WEIGHT: 130 LBS | BODY MASS INDEX: 26.21 KG/M2 | HEART RATE: 88 BPM

## 2019-08-13 DIAGNOSIS — R60.0 EDEMA OF RIGHT LOWER EXTREMITY: ICD-10-CM

## 2019-08-13 DIAGNOSIS — C83.00 LYMPHOMA, SMALL LYMPHOCYTIC (HCC): Primary | ICD-10-CM

## 2019-08-13 PROCEDURE — 99214 OFFICE O/P EST MOD 30 MIN: CPT | Performed by: INTERNAL MEDICINE

## 2019-08-14 ENCOUNTER — HOSPITAL ENCOUNTER (OUTPATIENT)
Dept: CT IMAGING | Age: 74
Discharge: HOME | End: 2019-08-14
Payer: MEDICARE

## 2019-08-14 DIAGNOSIS — C83.00 LYMPHOMA, SMALL LYMPHOCYTIC (HCC): ICD-10-CM

## 2019-08-14 PROCEDURE — 71260 CT THORAX DX C+: CPT

## 2019-08-14 PROCEDURE — 6360000004 HC RX CONTRAST MEDICATION: Performed by: INTERNAL MEDICINE

## 2019-08-14 PROCEDURE — 74177 CT ABD & PELVIS W/CONTRAST: CPT

## 2019-08-14 RX ADMIN — IOPAMIDOL 75 ML: 755 INJECTION, SOLUTION INTRAVENOUS at 11:24

## 2019-09-11 ENCOUNTER — TELEPHONE (OUTPATIENT)
Dept: INTERNAL MEDICINE | Age: 74
End: 2019-09-11

## 2019-09-11 RX ORDER — CEFUROXIME AXETIL 500 MG/1
500 TABLET ORAL 2 TIMES DAILY
Qty: 14 TABLET | Refills: 0 | Status: SHIPPED | OUTPATIENT
Start: 2019-09-11 | End: 2019-09-18

## 2019-09-26 ENCOUNTER — HOSPITAL ENCOUNTER (OUTPATIENT)
Dept: INFUSION THERAPY | Age: 74
Discharge: HOME | End: 2019-09-26
Payer: MEDICARE

## 2019-09-26 ENCOUNTER — OFFICE VISIT (OUTPATIENT)
Dept: HEMATOLOGY | Age: 74
End: 2019-09-26
Payer: MEDICARE

## 2019-09-26 VITALS
HEART RATE: 88 BPM | DIASTOLIC BLOOD PRESSURE: 88 MMHG | OXYGEN SATURATION: 96 % | HEIGHT: 59 IN | BODY MASS INDEX: 25.74 KG/M2 | SYSTOLIC BLOOD PRESSURE: 142 MMHG | WEIGHT: 127.7 LBS

## 2019-09-26 DIAGNOSIS — C83.00 LYMPHOMA, SMALL LYMPHOCYTIC (HCC): ICD-10-CM

## 2019-09-26 DIAGNOSIS — Z51.11 CHEMOTHERAPY MANAGEMENT, ENCOUNTER FOR: ICD-10-CM

## 2019-09-26 DIAGNOSIS — C83.00 LYMPHOMA, SMALL LYMPHOCYTIC (HCC): Primary | ICD-10-CM

## 2019-09-26 DIAGNOSIS — T45.1X5D ADVERSE EFFECT OF CHEMOTHERAPY, SUBSEQUENT ENCOUNTER: ICD-10-CM

## 2019-09-26 DIAGNOSIS — M71.21 BAKER'S CYST OF KNEE, RIGHT: ICD-10-CM

## 2019-09-26 DIAGNOSIS — Z71.89 CARE PLAN DISCUSSED WITH PATIENT: ICD-10-CM

## 2019-09-26 PROCEDURE — G8427 DOCREV CUR MEDS BY ELIG CLIN: HCPCS | Performed by: INTERNAL MEDICINE

## 2019-09-26 PROCEDURE — G8399 PT W/DXA RESULTS DOCUMENT: HCPCS | Performed by: INTERNAL MEDICINE

## 2019-09-26 PROCEDURE — 1090F PRES/ABSN URINE INCON ASSESS: CPT | Performed by: INTERNAL MEDICINE

## 2019-09-26 PROCEDURE — 1123F ACP DISCUSS/DSCN MKR DOCD: CPT | Performed by: INTERNAL MEDICINE

## 2019-09-26 PROCEDURE — 83615 LACTATE (LD) (LDH) ENZYME: CPT

## 2019-09-26 PROCEDURE — 3017F COLORECTAL CA SCREEN DOC REV: CPT | Performed by: INTERNAL MEDICINE

## 2019-09-26 PROCEDURE — 80053 COMPREHEN METABOLIC PANEL: CPT

## 2019-09-26 PROCEDURE — G8419 CALC BMI OUT NRM PARAM NOF/U: HCPCS | Performed by: INTERNAL MEDICINE

## 2019-09-26 PROCEDURE — 85025 COMPLETE CBC W/AUTO DIFF WBC: CPT

## 2019-09-26 PROCEDURE — 36415 COLL VENOUS BLD VENIPUNCTURE: CPT

## 2019-09-26 PROCEDURE — 1036F TOBACCO NON-USER: CPT | Performed by: INTERNAL MEDICINE

## 2019-09-26 PROCEDURE — 99214 OFFICE O/P EST MOD 30 MIN: CPT | Performed by: INTERNAL MEDICINE

## 2019-09-26 PROCEDURE — 4040F PNEUMOC VAC/ADMIN/RCVD: CPT | Performed by: INTERNAL MEDICINE

## 2019-10-31 ENCOUNTER — IMMUNIZATION (OUTPATIENT)
Dept: RETAIL CLINIC | Facility: CLINIC | Age: 74
End: 2019-10-31

## 2019-10-31 VITALS — DIASTOLIC BLOOD PRESSURE: 76 MMHG | SYSTOLIC BLOOD PRESSURE: 146 MMHG

## 2019-10-31 DIAGNOSIS — Z23 FLU VACCINE NEED: Primary | ICD-10-CM

## 2019-10-31 PROCEDURE — G0008 ADMIN INFLUENZA VIRUS VAC: HCPCS | Performed by: NURSE PRACTITIONER

## 2019-10-31 PROCEDURE — 90653 IIV ADJUVANT VACCINE IM: CPT | Performed by: NURSE PRACTITIONER

## 2019-10-31 NOTE — PROGRESS NOTES
Patient presented requesting flu vaccine which was administered per protocol. F See scanned documents. (Eventful). Tolerated ijection well.

## 2019-11-18 VITALS
HEART RATE: 84 BPM | WEIGHT: 132 LBS | HEIGHT: 59 IN | BODY MASS INDEX: 26.61 KG/M2 | SYSTOLIC BLOOD PRESSURE: 132 MMHG | DIASTOLIC BLOOD PRESSURE: 76 MMHG

## 2019-11-18 DIAGNOSIS — C85.15 UNSPECIFIED B-CELL LYMPHOMA, LYMPH NODES OF INGUINAL REGION AND LOWER LIMB (HCC): ICD-10-CM

## 2019-11-19 ENCOUNTER — OFFICE VISIT (OUTPATIENT)
Dept: HEMATOLOGY | Age: 74
End: 2019-11-19
Payer: MEDICARE

## 2019-11-19 VITALS
BODY MASS INDEX: 25.6 KG/M2 | SYSTOLIC BLOOD PRESSURE: 152 MMHG | WEIGHT: 127 LBS | HEART RATE: 108 BPM | HEIGHT: 59 IN | DIASTOLIC BLOOD PRESSURE: 70 MMHG

## 2019-11-19 DIAGNOSIS — Z71.89 CARE PLAN DISCUSSED WITH PATIENT: ICD-10-CM

## 2019-11-19 DIAGNOSIS — Z51.11 CHEMOTHERAPY MANAGEMENT, ENCOUNTER FOR: ICD-10-CM

## 2019-11-19 DIAGNOSIS — T45.1X5D ADVERSE EFFECT OF CHEMOTHERAPY, SUBSEQUENT ENCOUNTER: ICD-10-CM

## 2019-11-19 DIAGNOSIS — C83.00 LYMPHOMA, SMALL LYMPHOCYTIC (HCC): Primary | ICD-10-CM

## 2019-11-19 DIAGNOSIS — R60.0 EDEMA OF RIGHT LOWER EXTREMITY: ICD-10-CM

## 2019-11-19 PROCEDURE — 3017F COLORECTAL CA SCREEN DOC REV: CPT | Performed by: INTERNAL MEDICINE

## 2019-11-19 PROCEDURE — 4040F PNEUMOC VAC/ADMIN/RCVD: CPT | Performed by: INTERNAL MEDICINE

## 2019-11-19 PROCEDURE — G8427 DOCREV CUR MEDS BY ELIG CLIN: HCPCS | Performed by: INTERNAL MEDICINE

## 2019-11-19 PROCEDURE — 1036F TOBACCO NON-USER: CPT | Performed by: INTERNAL MEDICINE

## 2019-11-19 PROCEDURE — 99214 OFFICE O/P EST MOD 30 MIN: CPT | Performed by: INTERNAL MEDICINE

## 2019-11-19 PROCEDURE — 1123F ACP DISCUSS/DSCN MKR DOCD: CPT | Performed by: INTERNAL MEDICINE

## 2019-11-19 PROCEDURE — G8484 FLU IMMUNIZE NO ADMIN: HCPCS | Performed by: INTERNAL MEDICINE

## 2019-11-19 PROCEDURE — G8419 CALC BMI OUT NRM PARAM NOF/U: HCPCS | Performed by: INTERNAL MEDICINE

## 2019-11-19 PROCEDURE — G8399 PT W/DXA RESULTS DOCUMENT: HCPCS | Performed by: INTERNAL MEDICINE

## 2019-11-19 PROCEDURE — 1090F PRES/ABSN URINE INCON ASSESS: CPT | Performed by: INTERNAL MEDICINE

## 2019-11-19 RX ORDER — ALLOPURINOL 100 MG/1
200 TABLET ORAL DAILY
Qty: 60 TABLET | Refills: 3 | Status: SHIPPED | OUTPATIENT
Start: 2019-11-19 | End: 2019-12-12 | Stop reason: CLARIF

## 2019-11-20 RX ORDER — DIPHENHYDRAMINE HYDROCHLORIDE 50 MG/ML
50 INJECTION INTRAMUSCULAR; INTRAVENOUS ONCE
Status: CANCELLED | OUTPATIENT
Start: 2019-12-05

## 2019-11-20 RX ORDER — SODIUM CHLORIDE 0.9 % (FLUSH) 0.9 %
10 SYRINGE (ML) INJECTION PRN
Status: CANCELLED | OUTPATIENT
Start: 2019-12-11

## 2019-11-20 RX ORDER — HEPARIN SODIUM (PORCINE) LOCK FLUSH IV SOLN 100 UNIT/ML 100 UNIT/ML
500 SOLUTION INTRAVENOUS PRN
Status: CANCELLED | OUTPATIENT
Start: 2019-12-04

## 2019-11-20 RX ORDER — SODIUM CHLORIDE 0.9 % (FLUSH) 0.9 %
5 SYRINGE (ML) INJECTION PRN
Status: CANCELLED | OUTPATIENT
Start: 2019-12-18

## 2019-11-20 RX ORDER — MEPERIDINE HYDROCHLORIDE 50 MG/ML
12.5 INJECTION INTRAMUSCULAR; INTRAVENOUS; SUBCUTANEOUS ONCE
Status: CANCELLED | OUTPATIENT
Start: 2019-12-05

## 2019-11-20 RX ORDER — METHYLPREDNISOLONE SODIUM SUCCINATE 125 MG/2ML
125 INJECTION, POWDER, LYOPHILIZED, FOR SOLUTION INTRAMUSCULAR; INTRAVENOUS ONCE
Status: CANCELLED | OUTPATIENT
Start: 2019-12-11

## 2019-11-20 RX ORDER — SODIUM CHLORIDE 9 MG/ML
20 INJECTION, SOLUTION INTRAVENOUS ONCE
Status: CANCELLED | OUTPATIENT
Start: 2019-12-04

## 2019-11-20 RX ORDER — DIPHENHYDRAMINE HYDROCHLORIDE 50 MG/ML
50 INJECTION INTRAMUSCULAR; INTRAVENOUS ONCE
Status: CANCELLED | OUTPATIENT
Start: 2019-12-04

## 2019-11-20 RX ORDER — MEPERIDINE HYDROCHLORIDE 50 MG/ML
12.5 INJECTION INTRAMUSCULAR; INTRAVENOUS; SUBCUTANEOUS ONCE
Status: CANCELLED | OUTPATIENT
Start: 2019-12-04

## 2019-11-20 RX ORDER — METHYLPREDNISOLONE SODIUM SUCCINATE 125 MG/2ML
125 INJECTION, POWDER, LYOPHILIZED, FOR SOLUTION INTRAMUSCULAR; INTRAVENOUS ONCE
Status: CANCELLED | OUTPATIENT
Start: 2019-12-18

## 2019-11-20 RX ORDER — MEPERIDINE HYDROCHLORIDE 50 MG/ML
12.5 INJECTION INTRAMUSCULAR; INTRAVENOUS; SUBCUTANEOUS ONCE
Status: CANCELLED | OUTPATIENT
Start: 2019-12-18

## 2019-11-20 RX ORDER — MEPERIDINE HYDROCHLORIDE 50 MG/ML
12.5 INJECTION INTRAMUSCULAR; INTRAVENOUS; SUBCUTANEOUS ONCE
Status: CANCELLED | OUTPATIENT
Start: 2019-12-11

## 2019-11-20 RX ORDER — SODIUM CHLORIDE 9 MG/ML
INJECTION, SOLUTION INTRAVENOUS CONTINUOUS
Status: CANCELLED | OUTPATIENT
Start: 2019-12-04

## 2019-11-20 RX ORDER — HEPARIN SODIUM (PORCINE) LOCK FLUSH IV SOLN 100 UNIT/ML 100 UNIT/ML
500 SOLUTION INTRAVENOUS PRN
Status: CANCELLED | OUTPATIENT
Start: 2019-12-11

## 2019-11-20 RX ORDER — SODIUM CHLORIDE 0.9 % (FLUSH) 0.9 %
5 SYRINGE (ML) INJECTION PRN
Status: CANCELLED | OUTPATIENT
Start: 2019-12-04

## 2019-11-20 RX ORDER — SODIUM CHLORIDE 9 MG/ML
20 INJECTION, SOLUTION INTRAVENOUS ONCE
Status: CANCELLED | OUTPATIENT
Start: 2019-12-05

## 2019-11-20 RX ORDER — EPINEPHRINE 1 MG/ML
0.3 INJECTION, SOLUTION, CONCENTRATE INTRAVENOUS PRN
Status: CANCELLED | OUTPATIENT
Start: 2019-12-05

## 2019-11-20 RX ORDER — SODIUM CHLORIDE 0.9 % (FLUSH) 0.9 %
10 SYRINGE (ML) INJECTION PRN
Status: CANCELLED | OUTPATIENT
Start: 2019-12-18

## 2019-11-20 RX ORDER — EPINEPHRINE 1 MG/ML
0.3 INJECTION, SOLUTION, CONCENTRATE INTRAVENOUS PRN
Status: CANCELLED | OUTPATIENT
Start: 2019-12-18

## 2019-11-20 RX ORDER — HEPARIN SODIUM (PORCINE) LOCK FLUSH IV SOLN 100 UNIT/ML 100 UNIT/ML
500 SOLUTION INTRAVENOUS PRN
Status: CANCELLED | OUTPATIENT
Start: 2019-12-18

## 2019-11-20 RX ORDER — SODIUM CHLORIDE 0.9 % (FLUSH) 0.9 %
5 SYRINGE (ML) INJECTION PRN
Status: CANCELLED | OUTPATIENT
Start: 2019-12-11

## 2019-11-20 RX ORDER — SODIUM CHLORIDE 0.9 % (FLUSH) 0.9 %
10 SYRINGE (ML) INJECTION PRN
Status: CANCELLED | OUTPATIENT
Start: 2019-12-05

## 2019-11-20 RX ORDER — DIPHENHYDRAMINE HYDROCHLORIDE 50 MG/ML
50 INJECTION INTRAMUSCULAR; INTRAVENOUS ONCE
Status: CANCELLED | OUTPATIENT
Start: 2019-12-11

## 2019-11-20 RX ORDER — METHYLPREDNISOLONE SODIUM SUCCINATE 125 MG/2ML
125 INJECTION, POWDER, LYOPHILIZED, FOR SOLUTION INTRAMUSCULAR; INTRAVENOUS ONCE
Status: CANCELLED | OUTPATIENT
Start: 2019-12-05

## 2019-11-20 RX ORDER — SODIUM CHLORIDE 9 MG/ML
20 INJECTION, SOLUTION INTRAVENOUS ONCE
Status: CANCELLED | OUTPATIENT
Start: 2019-12-18

## 2019-11-20 RX ORDER — SODIUM CHLORIDE 9 MG/ML
INJECTION, SOLUTION INTRAVENOUS CONTINUOUS
Status: CANCELLED | OUTPATIENT
Start: 2019-12-05

## 2019-11-20 RX ORDER — SODIUM CHLORIDE 0.9 % (FLUSH) 0.9 %
5 SYRINGE (ML) INJECTION PRN
Status: CANCELLED | OUTPATIENT
Start: 2019-12-05

## 2019-11-20 RX ORDER — METHYLPREDNISOLONE SODIUM SUCCINATE 125 MG/2ML
125 INJECTION, POWDER, LYOPHILIZED, FOR SOLUTION INTRAMUSCULAR; INTRAVENOUS ONCE
Status: CANCELLED | OUTPATIENT
Start: 2019-12-04

## 2019-11-20 RX ORDER — HEPARIN SODIUM (PORCINE) LOCK FLUSH IV SOLN 100 UNIT/ML 100 UNIT/ML
500 SOLUTION INTRAVENOUS PRN
Status: CANCELLED | OUTPATIENT
Start: 2019-12-05

## 2019-11-20 RX ORDER — EPINEPHRINE 1 MG/ML
0.3 INJECTION, SOLUTION, CONCENTRATE INTRAVENOUS PRN
Status: CANCELLED | OUTPATIENT
Start: 2019-12-04

## 2019-11-20 RX ORDER — EPINEPHRINE 1 MG/ML
0.3 INJECTION, SOLUTION, CONCENTRATE INTRAVENOUS PRN
Status: CANCELLED | OUTPATIENT
Start: 2019-12-11

## 2019-11-20 RX ORDER — SODIUM CHLORIDE 9 MG/ML
20 INJECTION, SOLUTION INTRAVENOUS ONCE
Status: CANCELLED | OUTPATIENT
Start: 2019-12-11

## 2019-11-20 RX ORDER — DIPHENHYDRAMINE HYDROCHLORIDE 50 MG/ML
50 INJECTION INTRAMUSCULAR; INTRAVENOUS ONCE
Status: CANCELLED | OUTPATIENT
Start: 2019-12-18

## 2019-11-20 RX ORDER — SODIUM CHLORIDE 9 MG/ML
INJECTION, SOLUTION INTRAVENOUS CONTINUOUS
Status: CANCELLED | OUTPATIENT
Start: 2019-12-11

## 2019-11-20 RX ORDER — SODIUM CHLORIDE 9 MG/ML
INJECTION, SOLUTION INTRAVENOUS CONTINUOUS
Status: CANCELLED | OUTPATIENT
Start: 2019-12-18

## 2019-11-20 RX ORDER — SODIUM CHLORIDE 0.9 % (FLUSH) 0.9 %
10 SYRINGE (ML) INJECTION PRN
Status: CANCELLED | OUTPATIENT
Start: 2019-12-04

## 2019-11-27 ENCOUNTER — TELEPHONE (OUTPATIENT)
Dept: INFUSION THERAPY | Age: 74
End: 2019-11-27

## 2019-12-04 ENCOUNTER — HOSPITAL ENCOUNTER (OUTPATIENT)
Dept: INFUSION THERAPY | Age: 74
Discharge: HOME | End: 2019-12-04
Payer: MEDICARE

## 2019-12-04 VITALS
DIASTOLIC BLOOD PRESSURE: 78 MMHG | OXYGEN SATURATION: 96 % | BODY MASS INDEX: 25.69 KG/M2 | SYSTOLIC BLOOD PRESSURE: 160 MMHG | WEIGHT: 127.2 LBS | TEMPERATURE: 98.6 F | RESPIRATION RATE: 14 BRPM

## 2019-12-04 DIAGNOSIS — C83.00 LYMPHOMA, SMALL LYMPHOCYTIC (HCC): Primary | ICD-10-CM

## 2019-12-04 PROCEDURE — 96367 TX/PROPH/DG ADDL SEQ IV INF: CPT

## 2019-12-04 PROCEDURE — 85025 COMPLETE CBC W/AUTO DIFF WBC: CPT

## 2019-12-04 PROCEDURE — 80053 COMPREHEN METABOLIC PANEL: CPT

## 2019-12-04 PROCEDURE — 96375 TX/PRO/DX INJ NEW DRUG ADDON: CPT

## 2019-12-04 PROCEDURE — 96374 THER/PROPH/DIAG INJ IV PUSH: CPT

## 2019-12-04 PROCEDURE — 6370000000 HC RX 637 (ALT 250 FOR IP): Performed by: INTERNAL MEDICINE

## 2019-12-04 PROCEDURE — 83615 LACTATE (LD) (LDH) ENZYME: CPT

## 2019-12-04 PROCEDURE — 2580000003 HC RX 258: Performed by: INTERNAL MEDICINE

## 2019-12-04 PROCEDURE — 96365 THER/PROPH/DIAG IV INF INIT: CPT

## 2019-12-04 PROCEDURE — 84550 ASSAY OF BLOOD/URIC ACID: CPT

## 2019-12-04 PROCEDURE — 36415 COLL VENOUS BLD VENIPUNCTURE: CPT

## 2019-12-04 PROCEDURE — 6360000002 HC RX W HCPCS: Performed by: INTERNAL MEDICINE

## 2019-12-04 PROCEDURE — 96415 CHEMO IV INFUSION ADDL HR: CPT

## 2019-12-04 PROCEDURE — 96413 CHEMO IV INFUSION 1 HR: CPT

## 2019-12-04 RX ORDER — ACETAMINOPHEN 500 MG
1000 TABLET ORAL ONCE
Status: COMPLETED | OUTPATIENT
Start: 2019-12-04 | End: 2019-12-04

## 2019-12-04 RX ORDER — DIPHENHYDRAMINE HYDROCHLORIDE 50 MG/ML
50 INJECTION INTRAMUSCULAR; INTRAVENOUS ONCE
Status: COMPLETED | OUTPATIENT
Start: 2019-12-04 | End: 2019-12-04

## 2019-12-04 RX ORDER — ACETAMINOPHEN 500 MG
1000 TABLET ORAL ONCE
Qty: 120 TABLET | Refills: 3 | Status: SHIPPED | OUTPATIENT
Start: 2019-12-04 | End: 2020-01-01

## 2019-12-04 RX ORDER — SODIUM CHLORIDE 0.9 % (FLUSH) 0.9 %
10 SYRINGE (ML) INJECTION PRN
Status: DISCONTINUED | OUTPATIENT
Start: 2019-12-04 | End: 2019-12-05 | Stop reason: HOSPADM

## 2019-12-04 RX ORDER — ACETAMINOPHEN 500 MG
1000 TABLET ORAL ONCE
Status: CANCELLED
Start: 2019-12-05

## 2019-12-04 RX ADMIN — DIPHENHYDRAMINE HYDROCHLORIDE 50 MG: 50 INJECTION, SOLUTION INTRAMUSCULAR; INTRAVENOUS at 13:10

## 2019-12-04 RX ADMIN — DEXAMETHASONE SODIUM PHOSPHATE 16 MG: 10 INJECTION, SOLUTION INTRAMUSCULAR; INTRAVENOUS at 13:12

## 2019-12-04 RX ADMIN — ACETAMINOPHEN 1000 MG: 500 TABLET ORAL at 13:09

## 2019-12-04 RX ADMIN — OBINUTUZUMAB 100 MG: 1000 INJECTION, SOLUTION, CONCENTRATE INTRAVENOUS at 13:53

## 2019-12-04 ASSESSMENT — PAIN SCALES - GENERAL: PAINLEVEL_OUTOF10: 0

## 2019-12-05 ENCOUNTER — HOSPITAL ENCOUNTER (OUTPATIENT)
Dept: INFUSION THERAPY | Age: 74
Discharge: HOME | End: 2019-12-05
Payer: MEDICARE

## 2019-12-05 VITALS — RESPIRATION RATE: 78 BRPM | TEMPERATURE: 97.7 F | SYSTOLIC BLOOD PRESSURE: 140 MMHG | DIASTOLIC BLOOD PRESSURE: 88 MMHG

## 2019-12-05 DIAGNOSIS — C83.00 LYMPHOMA, SMALL LYMPHOCYTIC (HCC): Primary | ICD-10-CM

## 2019-12-05 PROCEDURE — 96417 CHEMO IV INFUS EACH ADDL SEQ: CPT

## 2019-12-05 PROCEDURE — 6370000000 HC RX 637 (ALT 250 FOR IP): Performed by: INTERNAL MEDICINE

## 2019-12-05 PROCEDURE — 96367 TX/PROPH/DG ADDL SEQ IV INF: CPT

## 2019-12-05 PROCEDURE — 96375 TX/PRO/DX INJ NEW DRUG ADDON: CPT

## 2019-12-05 PROCEDURE — 96413 CHEMO IV INFUSION 1 HR: CPT

## 2019-12-05 PROCEDURE — 6360000002 HC RX W HCPCS: Performed by: INTERNAL MEDICINE

## 2019-12-05 PROCEDURE — 96374 THER/PROPH/DIAG INJ IV PUSH: CPT

## 2019-12-05 PROCEDURE — 96368 THER/DIAG CONCURRENT INF: CPT

## 2019-12-05 PROCEDURE — 96415 CHEMO IV INFUSION ADDL HR: CPT

## 2019-12-05 PROCEDURE — 2580000003 HC RX 258: Performed by: INTERNAL MEDICINE

## 2019-12-05 RX ORDER — SODIUM CHLORIDE 9 MG/ML
20 INJECTION, SOLUTION INTRAVENOUS ONCE
Status: COMPLETED | OUTPATIENT
Start: 2019-12-05 | End: 2019-12-05

## 2019-12-05 RX ORDER — ACETAMINOPHEN 500 MG
1000 TABLET ORAL ONCE
Status: COMPLETED | OUTPATIENT
Start: 2019-12-05 | End: 2019-12-05

## 2019-12-05 RX ORDER — DIPHENHYDRAMINE HYDROCHLORIDE 50 MG/ML
50 INJECTION INTRAMUSCULAR; INTRAVENOUS ONCE
Status: COMPLETED | OUTPATIENT
Start: 2019-12-05 | End: 2019-12-05

## 2019-12-05 RX ADMIN — SODIUM CHLORIDE 20 ML/HR: 9 INJECTION, SOLUTION INTRAVENOUS at 11:02

## 2019-12-05 RX ADMIN — OBINUTUZUMAB 900 MG: 1000 INJECTION, SOLUTION, CONCENTRATE INTRAVENOUS at 11:21

## 2019-12-05 RX ADMIN — ACETAMINOPHEN 1000 MG: 500 TABLET, FILM COATED ORAL at 10:53

## 2019-12-05 RX ADMIN — DEXAMETHASONE SODIUM PHOSPHATE: 10 INJECTION, SOLUTION INTRAMUSCULAR; INTRAVENOUS at 10:45

## 2019-12-05 RX ADMIN — DIPHENHYDRAMINE HYDROCHLORIDE 50 MG: 50 INJECTION, SOLUTION INTRAMUSCULAR; INTRAVENOUS at 10:44

## 2019-12-05 ASSESSMENT — PAIN SCALES - GENERAL: PAINLEVEL_OUTOF10: 0

## 2019-12-06 DIAGNOSIS — I10 ESSENTIAL HYPERTENSION: ICD-10-CM

## 2019-12-06 DIAGNOSIS — M81.6 LOCALIZED OSTEOPOROSIS WITHOUT CURRENT PATHOLOGICAL FRACTURE: ICD-10-CM

## 2019-12-06 DIAGNOSIS — R73.01 IFG (IMPAIRED FASTING GLUCOSE): ICD-10-CM

## 2019-12-06 DIAGNOSIS — E78.00 PURE HYPERCHOLESTEROLEMIA: ICD-10-CM

## 2019-12-06 DIAGNOSIS — Z00.00 MEDICARE ANNUAL WELLNESS VISIT, SUBSEQUENT: ICD-10-CM

## 2019-12-06 LAB
ALBUMIN SERPL-MCNC: 3.9 G/DL (ref 3.5–5.2)
ALP BLD-CCNC: 134 U/L (ref 35–104)
ALT SERPL-CCNC: 66 U/L (ref 5–33)
ANION GAP SERPL CALCULATED.3IONS-SCNC: 15 MMOL/L (ref 7–19)
AST SERPL-CCNC: 50 U/L (ref 5–32)
BILIRUB SERPL-MCNC: <0.2 MG/DL (ref 0.2–1.2)
BUN BLDV-MCNC: 10 MG/DL (ref 8–23)
CALCIUM SERPL-MCNC: 10.1 MG/DL (ref 8.8–10.2)
CHLORIDE BLD-SCNC: 98 MMOL/L (ref 98–111)
CHOLESTEROL, TOTAL: 150 MG/DL (ref 160–199)
CO2: 27 MMOL/L (ref 22–29)
CREAT SERPL-MCNC: 0.5 MG/DL (ref 0.5–0.9)
GFR NON-AFRICAN AMERICAN: >60
GLUCOSE BLD-MCNC: 88 MG/DL (ref 74–109)
HBA1C MFR BLD: 5.7 % (ref 4–6)
HDLC SERPL-MCNC: 64 MG/DL (ref 65–121)
LDL CHOLESTEROL CALCULATED: 59 MG/DL
POTASSIUM SERPL-SCNC: 3.5 MMOL/L (ref 3.5–5)
SODIUM BLD-SCNC: 140 MMOL/L (ref 136–145)
TOTAL PROTEIN: 7.2 G/DL (ref 6.6–8.7)
TRIGL SERPL-MCNC: 137 MG/DL (ref 0–149)
VITAMIN D 25-HYDROXY: 48 NG/ML

## 2019-12-11 ENCOUNTER — HOSPITAL ENCOUNTER (OUTPATIENT)
Dept: INFUSION THERAPY | Age: 74
Discharge: HOME | End: 2019-12-11
Payer: MEDICARE

## 2019-12-11 ENCOUNTER — OFFICE VISIT (OUTPATIENT)
Dept: HEMATOLOGY | Age: 74
End: 2019-12-11
Payer: MEDICARE

## 2019-12-11 VITALS
WEIGHT: 124 LBS | TEMPERATURE: 97.9 F | SYSTOLIC BLOOD PRESSURE: 178 MMHG | RESPIRATION RATE: 16 BRPM | DIASTOLIC BLOOD PRESSURE: 80 MMHG | HEART RATE: 89 BPM | BODY MASS INDEX: 25.04 KG/M2

## 2019-12-11 VITALS
WEIGHT: 124 LBS | HEART RATE: 89 BPM | TEMPERATURE: 97.9 F | DIASTOLIC BLOOD PRESSURE: 80 MMHG | OXYGEN SATURATION: 99 % | BODY MASS INDEX: 25.04 KG/M2 | SYSTOLIC BLOOD PRESSURE: 178 MMHG | RESPIRATION RATE: 16 BRPM

## 2019-12-11 DIAGNOSIS — C83.00 LYMPHOMA, SMALL LYMPHOCYTIC (HCC): ICD-10-CM

## 2019-12-11 DIAGNOSIS — Z71.89 CARE PLAN DISCUSSED WITH PATIENT: ICD-10-CM

## 2019-12-11 DIAGNOSIS — Z51.11 CHEMOTHERAPY MANAGEMENT, ENCOUNTER FOR: Primary | ICD-10-CM

## 2019-12-11 DIAGNOSIS — C83.00 LYMPHOMA, SMALL LYMPHOCYTIC (HCC): Primary | ICD-10-CM

## 2019-12-11 DIAGNOSIS — T45.1X5D ADVERSE EFFECT OF CHEMOTHERAPY, SUBSEQUENT ENCOUNTER: ICD-10-CM

## 2019-12-11 DIAGNOSIS — Z51.12 ENCOUNTER FOR ANTINEOPLASTIC IMMUNOTHERAPY: ICD-10-CM

## 2019-12-11 PROCEDURE — G8399 PT W/DXA RESULTS DOCUMENT: HCPCS | Performed by: INTERNAL MEDICINE

## 2019-12-11 PROCEDURE — 96413 CHEMO IV INFUSION 1 HR: CPT

## 2019-12-11 PROCEDURE — 4040F PNEUMOC VAC/ADMIN/RCVD: CPT | Performed by: INTERNAL MEDICINE

## 2019-12-11 PROCEDURE — G8427 DOCREV CUR MEDS BY ELIG CLIN: HCPCS | Performed by: INTERNAL MEDICINE

## 2019-12-11 PROCEDURE — 96375 TX/PRO/DX INJ NEW DRUG ADDON: CPT

## 2019-12-11 PROCEDURE — 1036F TOBACCO NON-USER: CPT | Performed by: INTERNAL MEDICINE

## 2019-12-11 PROCEDURE — 99214 OFFICE O/P EST MOD 30 MIN: CPT | Performed by: INTERNAL MEDICINE

## 2019-12-11 PROCEDURE — G8417 CALC BMI ABV UP PARAM F/U: HCPCS | Performed by: INTERNAL MEDICINE

## 2019-12-11 PROCEDURE — 2580000003 HC RX 258: Performed by: INTERNAL MEDICINE

## 2019-12-11 PROCEDURE — 3017F COLORECTAL CA SCREEN DOC REV: CPT | Performed by: INTERNAL MEDICINE

## 2019-12-11 PROCEDURE — G8484 FLU IMMUNIZE NO ADMIN: HCPCS | Performed by: INTERNAL MEDICINE

## 2019-12-11 PROCEDURE — 6370000000 HC RX 637 (ALT 250 FOR IP): Performed by: INTERNAL MEDICINE

## 2019-12-11 PROCEDURE — 96367 TX/PROPH/DG ADDL SEQ IV INF: CPT

## 2019-12-11 PROCEDURE — 1123F ACP DISCUSS/DSCN MKR DOCD: CPT | Performed by: INTERNAL MEDICINE

## 2019-12-11 PROCEDURE — 85025 COMPLETE CBC W/AUTO DIFF WBC: CPT

## 2019-12-11 PROCEDURE — 6360000002 HC RX W HCPCS: Performed by: INTERNAL MEDICINE

## 2019-12-11 PROCEDURE — 1090F PRES/ABSN URINE INCON ASSESS: CPT | Performed by: INTERNAL MEDICINE

## 2019-12-11 PROCEDURE — 96415 CHEMO IV INFUSION ADDL HR: CPT

## 2019-12-11 RX ORDER — DIPHENHYDRAMINE HYDROCHLORIDE 50 MG/ML
50 INJECTION INTRAMUSCULAR; INTRAVENOUS ONCE
Status: COMPLETED | OUTPATIENT
Start: 2019-12-11 | End: 2019-12-11

## 2019-12-11 RX ORDER — SODIUM CHLORIDE 9 MG/ML
20 INJECTION, SOLUTION INTRAVENOUS ONCE
Status: DISCONTINUED | OUTPATIENT
Start: 2019-12-11 | End: 2019-12-13 | Stop reason: HOSPADM

## 2019-12-11 RX ORDER — ACETAMINOPHEN 500 MG
1000 TABLET ORAL ONCE
Status: COMPLETED | OUTPATIENT
Start: 2019-12-11 | End: 2019-12-11

## 2019-12-11 RX ADMIN — OBINUTUZUMAB 1000 MG: 1000 INJECTION, SOLUTION, CONCENTRATE INTRAVENOUS at 12:43

## 2019-12-11 RX ADMIN — DEXAMETHASONE SODIUM PHOSPHATE: 10 INJECTION, SOLUTION INTRAMUSCULAR; INTRAVENOUS at 12:12

## 2019-12-11 RX ADMIN — DIPHENHYDRAMINE HYDROCHLORIDE 50 MG: 50 INJECTION, SOLUTION INTRAMUSCULAR; INTRAVENOUS at 11:59

## 2019-12-11 RX ADMIN — ACETAMINOPHEN 1000 MG: 500 TABLET ORAL at 11:59

## 2019-12-11 ASSESSMENT — PAIN SCALES - GENERAL
PAINLEVEL_OUTOF10: 0
PAINLEVEL_OUTOF10: 0

## 2019-12-12 ENCOUNTER — OFFICE VISIT (OUTPATIENT)
Dept: INTERNAL MEDICINE | Age: 74
End: 2019-12-12
Payer: MEDICARE

## 2019-12-12 VITALS
HEIGHT: 59 IN | RESPIRATION RATE: 18 BRPM | DIASTOLIC BLOOD PRESSURE: 78 MMHG | WEIGHT: 124 LBS | SYSTOLIC BLOOD PRESSURE: 138 MMHG | OXYGEN SATURATION: 98 % | HEART RATE: 94 BPM | BODY MASS INDEX: 25 KG/M2

## 2019-12-12 DIAGNOSIS — M81.6 LOCALIZED OSTEOPOROSIS WITHOUT CURRENT PATHOLOGICAL FRACTURE: ICD-10-CM

## 2019-12-12 DIAGNOSIS — E78.00 PURE HYPERCHOLESTEROLEMIA: ICD-10-CM

## 2019-12-12 DIAGNOSIS — R73.01 IFG (IMPAIRED FASTING GLUCOSE): Primary | ICD-10-CM

## 2019-12-12 DIAGNOSIS — I10 ESSENTIAL HYPERTENSION: ICD-10-CM

## 2019-12-12 PROCEDURE — 4040F PNEUMOC VAC/ADMIN/RCVD: CPT | Performed by: INTERNAL MEDICINE

## 2019-12-12 PROCEDURE — 1090F PRES/ABSN URINE INCON ASSESS: CPT | Performed by: INTERNAL MEDICINE

## 2019-12-12 PROCEDURE — G8417 CALC BMI ABV UP PARAM F/U: HCPCS | Performed by: INTERNAL MEDICINE

## 2019-12-12 PROCEDURE — G8484 FLU IMMUNIZE NO ADMIN: HCPCS | Performed by: INTERNAL MEDICINE

## 2019-12-12 PROCEDURE — G8399 PT W/DXA RESULTS DOCUMENT: HCPCS | Performed by: INTERNAL MEDICINE

## 2019-12-12 PROCEDURE — 1123F ACP DISCUSS/DSCN MKR DOCD: CPT | Performed by: INTERNAL MEDICINE

## 2019-12-12 PROCEDURE — 99214 OFFICE O/P EST MOD 30 MIN: CPT | Performed by: INTERNAL MEDICINE

## 2019-12-12 PROCEDURE — G8428 CUR MEDS NOT DOCUMENT: HCPCS | Performed by: INTERNAL MEDICINE

## 2019-12-12 PROCEDURE — 3017F COLORECTAL CA SCREEN DOC REV: CPT | Performed by: INTERNAL MEDICINE

## 2019-12-12 PROCEDURE — 1036F TOBACCO NON-USER: CPT | Performed by: INTERNAL MEDICINE

## 2019-12-12 RX ORDER — CALCITONIN SALMON 200 [IU]/.09ML
SPRAY, METERED NASAL
Qty: 4 ML | Refills: 3 | Status: SHIPPED | OUTPATIENT
Start: 2019-12-12 | End: 2020-01-01

## 2019-12-12 RX ORDER — ALENDRONATE SODIUM 70 MG/1
70 TABLET ORAL
Qty: 12 TABLET | Refills: 3 | Status: SHIPPED | OUTPATIENT
Start: 2019-12-12 | End: 2020-01-01

## 2019-12-12 RX ORDER — SIMVASTATIN 40 MG
40 TABLET ORAL NIGHTLY
Qty: 90 TABLET | Refills: 2 | Status: SHIPPED | OUTPATIENT
Start: 2019-12-12 | End: 2020-01-01

## 2019-12-12 RX ORDER — LISINOPRIL AND HYDROCHLOROTHIAZIDE 20; 12.5 MG/1; MG/1
TABLET ORAL
Qty: 90 TABLET | Refills: 2 | Status: SHIPPED | OUTPATIENT
Start: 2019-12-12 | End: 2020-01-01

## 2019-12-12 ASSESSMENT — ENCOUNTER SYMPTOMS
CHEST TIGHTNESS: 0
WHEEZING: 0
ABDOMINAL PAIN: 0
SORE THROAT: 0
COUGH: 0
CONSTIPATION: 0

## 2019-12-18 ENCOUNTER — HOSPITAL ENCOUNTER (OUTPATIENT)
Dept: INFUSION THERAPY | Age: 74
Discharge: HOME | End: 2019-12-18
Payer: MEDICARE

## 2019-12-18 VITALS
WEIGHT: 122 LBS | TEMPERATURE: 98.4 F | HEART RATE: 96 BPM | HEIGHT: 59 IN | DIASTOLIC BLOOD PRESSURE: 62 MMHG | SYSTOLIC BLOOD PRESSURE: 130 MMHG | BODY MASS INDEX: 24.6 KG/M2

## 2019-12-18 DIAGNOSIS — C83.00 LYMPHOMA, SMALL LYMPHOCYTIC (HCC): Primary | ICD-10-CM

## 2019-12-18 PROCEDURE — 96367 TX/PROPH/DG ADDL SEQ IV INF: CPT

## 2019-12-18 PROCEDURE — 96374 THER/PROPH/DIAG INJ IV PUSH: CPT

## 2019-12-18 PROCEDURE — 96413 CHEMO IV INFUSION 1 HR: CPT

## 2019-12-18 PROCEDURE — 96368 THER/DIAG CONCURRENT INF: CPT

## 2019-12-18 PROCEDURE — 85025 COMPLETE CBC W/AUTO DIFF WBC: CPT

## 2019-12-18 PROCEDURE — 6360000002 HC RX W HCPCS: Performed by: INTERNAL MEDICINE

## 2019-12-18 PROCEDURE — 6370000000 HC RX 637 (ALT 250 FOR IP): Performed by: INTERNAL MEDICINE

## 2019-12-18 PROCEDURE — 96415 CHEMO IV INFUSION ADDL HR: CPT

## 2019-12-18 PROCEDURE — 2580000003 HC RX 258: Performed by: INTERNAL MEDICINE

## 2019-12-18 PROCEDURE — 96365 THER/PROPH/DIAG IV INF INIT: CPT

## 2019-12-18 RX ORDER — DIPHENHYDRAMINE HYDROCHLORIDE 50 MG/ML
50 INJECTION INTRAMUSCULAR; INTRAVENOUS ONCE
Status: COMPLETED | OUTPATIENT
Start: 2019-12-18 | End: 2019-12-18

## 2019-12-18 RX ORDER — ACETAMINOPHEN 500 MG
1000 TABLET ORAL ONCE
Status: COMPLETED | OUTPATIENT
Start: 2019-12-18 | End: 2019-12-18

## 2019-12-18 RX ORDER — HEPARIN SODIUM (PORCINE) LOCK FLUSH IV SOLN 100 UNIT/ML 100 UNIT/ML
300 SOLUTION INTRAVENOUS PRN
Status: DISCONTINUED | OUTPATIENT
Start: 2019-12-18 | End: 2019-12-19 | Stop reason: HOSPADM

## 2019-12-18 RX ORDER — SODIUM CHLORIDE 0.9 % (FLUSH) 0.9 %
5 SYRINGE (ML) INJECTION PRN
Status: DISCONTINUED | OUTPATIENT
Start: 2019-12-18 | End: 2019-12-19 | Stop reason: HOSPADM

## 2019-12-18 RX ADMIN — OBINUTUZUMAB 1000 MG: 1000 INJECTION, SOLUTION, CONCENTRATE INTRAVENOUS at 11:31

## 2019-12-18 RX ADMIN — DIPHENHYDRAMINE HYDROCHLORIDE 50 MG: 50 INJECTION, SOLUTION INTRAMUSCULAR; INTRAVENOUS at 10:51

## 2019-12-18 RX ADMIN — ACETAMINOPHEN 1000 MG: 500 TABLET ORAL at 10:51

## 2019-12-18 RX ADMIN — DEXAMETHASONE SODIUM PHOSPHATE: 10 INJECTION, SOLUTION INTRAMUSCULAR; INTRAVENOUS at 10:58

## 2019-12-18 ASSESSMENT — PAIN SCALES - GENERAL: PAINLEVEL_OUTOF10: 0

## 2020-01-01 ENCOUNTER — HOSPITAL ENCOUNTER (OUTPATIENT)
Dept: INFUSION THERAPY | Age: 75
Discharge: HOME | End: 2020-06-12
Payer: MEDICARE

## 2020-01-01 ENCOUNTER — HOSPITAL ENCOUNTER (OUTPATIENT)
Dept: INFUSION THERAPY | Age: 75
End: 2020-01-01
Payer: MEDICARE

## 2020-01-01 ENCOUNTER — HOSPITAL ENCOUNTER (OUTPATIENT)
Dept: INFUSION THERAPY | Age: 75
Discharge: HOME | End: 2020-11-09
Payer: MEDICARE

## 2020-01-01 ENCOUNTER — HOSPITAL ENCOUNTER (OUTPATIENT)
Dept: INFUSION THERAPY | Age: 75
Discharge: HOME | End: 2020-12-07
Payer: MEDICARE

## 2020-01-01 ENCOUNTER — TELEPHONE (OUTPATIENT)
Dept: INFUSION THERAPY | Age: 75
End: 2020-01-01

## 2020-01-01 ENCOUNTER — OFFICE VISIT (OUTPATIENT)
Dept: HEMATOLOGY | Age: 75
End: 2020-01-01
Payer: MEDICARE

## 2020-01-01 ENCOUNTER — ANESTHESIA (OUTPATIENT)
Dept: OPERATING ROOM | Age: 75
End: 2020-01-01
Payer: MEDICARE

## 2020-01-01 ENCOUNTER — OFFICE VISIT (OUTPATIENT)
Dept: SURGERY | Age: 75
End: 2020-01-01

## 2020-01-01 ENCOUNTER — HOSPITAL ENCOUNTER (OUTPATIENT)
Dept: CT IMAGING | Age: 75
Discharge: HOME | End: 2020-10-26
Payer: MEDICARE

## 2020-01-01 ENCOUNTER — TELEPHONE (OUTPATIENT)
Dept: INTERNAL MEDICINE | Age: 75
End: 2020-01-01

## 2020-01-01 ENCOUNTER — TELEPHONE (OUTPATIENT)
Dept: HEMATOLOGY | Age: 75
End: 2020-01-01

## 2020-01-01 ENCOUNTER — CARE COORDINATION (OUTPATIENT)
Dept: CASE MANAGEMENT | Age: 75
End: 2020-01-01

## 2020-01-01 ENCOUNTER — HOSPITAL ENCOUNTER (OUTPATIENT)
Dept: NUCLEAR MEDICINE | Age: 75
Discharge: HOME | End: 2020-07-22
Payer: MEDICARE

## 2020-01-01 ENCOUNTER — HOSPITAL ENCOUNTER (OUTPATIENT)
Dept: CT IMAGING | Age: 75
Discharge: HOME | End: 2020-05-29
Payer: MEDICARE

## 2020-01-01 ENCOUNTER — TELEPHONE (OUTPATIENT)
Dept: SURGERY | Age: 75
End: 2020-01-01

## 2020-01-01 ENCOUNTER — HOSPITAL ENCOUNTER (OUTPATIENT)
Dept: INFUSION THERAPY | Age: 75
Discharge: HOME | End: 2020-08-17
Payer: MEDICARE

## 2020-01-01 ENCOUNTER — HOSPITAL ENCOUNTER (OUTPATIENT)
Dept: INFUSION THERAPY | Age: 75
Discharge: HOME | End: 2020-12-14
Payer: MEDICARE

## 2020-01-01 ENCOUNTER — HOSPITAL ENCOUNTER (OUTPATIENT)
Dept: INFUSION THERAPY | Age: 75
Discharge: HOME | End: 2020-09-10
Payer: MEDICARE

## 2020-01-01 ENCOUNTER — ANESTHESIA EVENT (OUTPATIENT)
Dept: OPERATING ROOM | Age: 75
End: 2020-01-01
Payer: MEDICARE

## 2020-01-01 ENCOUNTER — HOSPITAL ENCOUNTER (OUTPATIENT)
Dept: CT IMAGING | Age: 75
Discharge: HOME | End: 2020-07-20
Payer: MEDICARE

## 2020-01-01 ENCOUNTER — HOSPITAL ENCOUNTER (OUTPATIENT)
Dept: INFUSION THERAPY | Age: 75
Setting detail: INFUSION SERIES
Discharge: HOME | End: 2020-07-16
Payer: MEDICARE

## 2020-01-01 ENCOUNTER — OFFICE VISIT (OUTPATIENT)
Dept: SURGERY | Age: 75
End: 2020-01-01
Payer: MEDICARE

## 2020-01-01 ENCOUNTER — HOSPITAL ENCOUNTER (OUTPATIENT)
Dept: INFUSION THERAPY | Age: 75
Discharge: HOME | End: 2020-03-11
Payer: MEDICARE

## 2020-01-01 ENCOUNTER — HOSPITAL ENCOUNTER (OUTPATIENT)
Dept: INFUSION THERAPY | Age: 75
Discharge: HOME | End: 2020-11-10
Payer: MEDICARE

## 2020-01-01 ENCOUNTER — OFFICE VISIT (OUTPATIENT)
Dept: INTERNAL MEDICINE | Age: 75
End: 2020-01-01
Payer: MEDICARE

## 2020-01-01 ENCOUNTER — HOSPITAL ENCOUNTER (OUTPATIENT)
Dept: INFUSION THERAPY | Age: 75
Discharge: HOME | End: 2020-02-12
Payer: MEDICARE

## 2020-01-01 ENCOUNTER — HOSPITAL ENCOUNTER (OUTPATIENT)
Age: 75
Setting detail: OUTPATIENT SURGERY
Discharge: HOME | End: 2020-11-03
Attending: SURGERY | Admitting: SURGERY
Payer: MEDICARE

## 2020-01-01 ENCOUNTER — HOSPITAL ENCOUNTER (INPATIENT)
Age: 75
LOS: 3 days | Discharge: HOME | DRG: 641 | End: 2020-03-01
Attending: HOSPITALIST | Admitting: HOSPITALIST
Payer: MEDICARE

## 2020-01-01 ENCOUNTER — HOSPITAL ENCOUNTER (OUTPATIENT)
Dept: INFUSION THERAPY | Age: 75
Discharge: HOME | End: 2020-07-15
Payer: MEDICARE

## 2020-01-01 ENCOUNTER — APPOINTMENT (OUTPATIENT)
Dept: GENERAL RADIOLOGY | Age: 75
End: 2020-01-01
Attending: SURGERY
Payer: MEDICARE

## 2020-01-01 ENCOUNTER — HOSPITAL ENCOUNTER (OUTPATIENT)
Dept: INFUSION THERAPY | Age: 75
Discharge: HOME | End: 2020-11-11
Payer: MEDICARE

## 2020-01-01 ENCOUNTER — HOSPITAL ENCOUNTER (OUTPATIENT)
Dept: INFUSION THERAPY | Age: 75
Discharge: HOME | End: 2020-12-21
Payer: MEDICARE

## 2020-01-01 ENCOUNTER — HOSPITAL ENCOUNTER (OUTPATIENT)
Dept: INFUSION THERAPY | Age: 75
Discharge: HOME | End: 2020-05-22
Payer: MEDICARE

## 2020-01-01 ENCOUNTER — TELEPHONE (OUTPATIENT)
Dept: PRIMARY CARE CLINIC | Age: 75
End: 2020-01-01

## 2020-01-01 ENCOUNTER — HOSPITAL ENCOUNTER (OUTPATIENT)
Dept: INFUSION THERAPY | Age: 75
Setting detail: INFUSION SERIES
Discharge: HOME | End: 2020-03-12
Payer: MEDICARE

## 2020-01-01 ENCOUNTER — HOSPITAL ENCOUNTER (OUTPATIENT)
Dept: INFUSION THERAPY | Age: 75
Discharge: HOME | End: 2020-04-10
Payer: MEDICARE

## 2020-01-01 ENCOUNTER — HOSPITAL ENCOUNTER (OUTPATIENT)
Dept: ULTRASOUND IMAGING | Age: 75
Discharge: HOME | End: 2020-03-17
Payer: MEDICARE

## 2020-01-01 ENCOUNTER — HOSPITAL ENCOUNTER (OUTPATIENT)
Dept: INFUSION THERAPY | Age: 75
Discharge: HOME | End: 2020-05-01
Payer: MEDICARE

## 2020-01-01 ENCOUNTER — CLINICAL DOCUMENTATION (OUTPATIENT)
Dept: HEMATOLOGY | Age: 75
End: 2020-01-01

## 2020-01-01 ENCOUNTER — HOSPITAL ENCOUNTER (OUTPATIENT)
Dept: CT IMAGING | Age: 75
Discharge: HOME | End: 2020-03-16
Payer: MEDICARE

## 2020-01-01 ENCOUNTER — APPOINTMENT (OUTPATIENT)
Dept: INFUSION THERAPY | Age: 75
End: 2020-01-01
Payer: MEDICARE

## 2020-01-01 ENCOUNTER — HOSPITAL ENCOUNTER (OUTPATIENT)
Dept: INFUSION THERAPY | Age: 75
Discharge: HOME | End: 2020-10-20
Payer: MEDICARE

## 2020-01-01 ENCOUNTER — APPOINTMENT (OUTPATIENT)
Dept: GENERAL RADIOLOGY | Age: 75
DRG: 641 | End: 2020-01-01
Payer: MEDICARE

## 2020-01-01 ENCOUNTER — HOSPITAL ENCOUNTER (OUTPATIENT)
Dept: INFUSION THERAPY | Age: 75
Discharge: HOME | End: 2020-11-30
Payer: MEDICARE

## 2020-01-01 ENCOUNTER — HOSPITAL ENCOUNTER (OUTPATIENT)
Dept: INFUSION THERAPY | Age: 75
Discharge: HOME | End: 2020-03-20
Payer: MEDICARE

## 2020-01-01 ENCOUNTER — HOSPITAL ENCOUNTER (OUTPATIENT)
Dept: INFUSION THERAPY | Age: 75
Discharge: HOME | End: 2020-07-06
Payer: MEDICARE

## 2020-01-01 ENCOUNTER — HOSPITAL ENCOUNTER (OUTPATIENT)
Dept: PREADMISSION TESTING | Age: 75
Discharge: HOME | End: 2020-11-03
Payer: MEDICARE

## 2020-01-01 ENCOUNTER — HOSPITAL ENCOUNTER (OUTPATIENT)
Dept: GENERAL RADIOLOGY | Age: 75
Discharge: HOME | End: 2020-03-31
Attending: SURGERY
Payer: MEDICARE

## 2020-01-01 ENCOUNTER — HOSPITAL ENCOUNTER (OUTPATIENT)
Dept: ULTRASOUND IMAGING | Age: 75
Discharge: HOME | End: 2020-11-03
Payer: MEDICARE

## 2020-01-01 ENCOUNTER — HOSPITAL ENCOUNTER (OUTPATIENT)
Age: 75
Setting detail: OUTPATIENT SURGERY
Discharge: HOME | End: 2020-03-31
Attending: SURGERY | Admitting: SURGERY
Payer: MEDICARE

## 2020-01-01 VITALS
HEART RATE: 101 BPM | HEIGHT: 59 IN | RESPIRATION RATE: 18 BRPM | SYSTOLIC BLOOD PRESSURE: 138 MMHG | BODY MASS INDEX: 25 KG/M2 | OXYGEN SATURATION: 98 % | DIASTOLIC BLOOD PRESSURE: 80 MMHG | WEIGHT: 124 LBS

## 2020-01-01 VITALS
DIASTOLIC BLOOD PRESSURE: 80 MMHG | TEMPERATURE: 98.6 F | HEART RATE: 109 BPM | BODY MASS INDEX: 21.81 KG/M2 | WEIGHT: 108 LBS | SYSTOLIC BLOOD PRESSURE: 150 MMHG

## 2020-01-01 VITALS
SYSTOLIC BLOOD PRESSURE: 108 MMHG | BODY MASS INDEX: 25.67 KG/M2 | WEIGHT: 127.1 LBS | OXYGEN SATURATION: 96 % | HEART RATE: 108 BPM | DIASTOLIC BLOOD PRESSURE: 64 MMHG | TEMPERATURE: 97.1 F

## 2020-01-01 VITALS
HEIGHT: 59 IN | RESPIRATION RATE: 14 BRPM | HEART RATE: 86 BPM | WEIGHT: 120 LBS | TEMPERATURE: 96.7 F | BODY MASS INDEX: 24.19 KG/M2 | SYSTOLIC BLOOD PRESSURE: 159 MMHG | OXYGEN SATURATION: 98 % | DIASTOLIC BLOOD PRESSURE: 82 MMHG

## 2020-01-01 VITALS
OXYGEN SATURATION: 96 % | DIASTOLIC BLOOD PRESSURE: 86 MMHG | SYSTOLIC BLOOD PRESSURE: 140 MMHG | WEIGHT: 119.6 LBS | HEART RATE: 110 BPM | TEMPERATURE: 98.3 F | HEIGHT: 59 IN | BODY MASS INDEX: 24.11 KG/M2

## 2020-01-01 VITALS — WEIGHT: 117 LBS | HEIGHT: 59 IN | BODY MASS INDEX: 23.59 KG/M2

## 2020-01-01 VITALS
SYSTOLIC BLOOD PRESSURE: 120 MMHG | OXYGEN SATURATION: 99 % | DIASTOLIC BLOOD PRESSURE: 70 MMHG | HEART RATE: 101 BPM | TEMPERATURE: 97.7 F

## 2020-01-01 VITALS
HEIGHT: 59 IN | SYSTOLIC BLOOD PRESSURE: 177 MMHG | OXYGEN SATURATION: 98 % | RESPIRATION RATE: 14 BRPM | HEART RATE: 92 BPM | DIASTOLIC BLOOD PRESSURE: 94 MMHG | WEIGHT: 115 LBS | TEMPERATURE: 97.5 F | BODY MASS INDEX: 23.18 KG/M2

## 2020-01-01 VITALS
WEIGHT: 125 LBS | SYSTOLIC BLOOD PRESSURE: 142 MMHG | TEMPERATURE: 97.8 F | OXYGEN SATURATION: 97 % | BODY MASS INDEX: 25.2 KG/M2 | HEART RATE: 106 BPM | DIASTOLIC BLOOD PRESSURE: 70 MMHG | HEIGHT: 59 IN

## 2020-01-01 VITALS
DIASTOLIC BLOOD PRESSURE: 64 MMHG | BODY MASS INDEX: 24.76 KG/M2 | SYSTOLIC BLOOD PRESSURE: 122 MMHG | HEART RATE: 106 BPM | RESPIRATION RATE: 16 BRPM | OXYGEN SATURATION: 98 % | WEIGHT: 122.6 LBS | TEMPERATURE: 97.8 F

## 2020-01-01 VITALS
HEART RATE: 110 BPM | HEIGHT: 59 IN | BODY MASS INDEX: 24.76 KG/M2 | SYSTOLIC BLOOD PRESSURE: 120 MMHG | OXYGEN SATURATION: 98 % | WEIGHT: 122.8 LBS | TEMPERATURE: 96.8 F | DIASTOLIC BLOOD PRESSURE: 80 MMHG

## 2020-01-01 VITALS
HEART RATE: 116 BPM | TEMPERATURE: 97.8 F | RESPIRATION RATE: 20 BRPM | DIASTOLIC BLOOD PRESSURE: 71 MMHG | SYSTOLIC BLOOD PRESSURE: 112 MMHG | OXYGEN SATURATION: 100 %

## 2020-01-01 VITALS
SYSTOLIC BLOOD PRESSURE: 120 MMHG | BODY MASS INDEX: 21.97 KG/M2 | OXYGEN SATURATION: 100 % | HEART RATE: 104 BPM | WEIGHT: 108.8 LBS | DIASTOLIC BLOOD PRESSURE: 84 MMHG | TEMPERATURE: 99.2 F

## 2020-01-01 VITALS
DIASTOLIC BLOOD PRESSURE: 70 MMHG | OXYGEN SATURATION: 95 % | SYSTOLIC BLOOD PRESSURE: 140 MMHG | BODY MASS INDEX: 21.81 KG/M2 | RESPIRATION RATE: 16 BRPM | WEIGHT: 115 LBS | HEART RATE: 102 BPM | WEIGHT: 108 LBS | TEMPERATURE: 97.8 F | BODY MASS INDEX: 23.23 KG/M2

## 2020-01-01 VITALS
TEMPERATURE: 97.8 F | SYSTOLIC BLOOD PRESSURE: 136 MMHG | RESPIRATION RATE: 17 BRPM | BODY MASS INDEX: 23.73 KG/M2 | WEIGHT: 117.5 LBS | OXYGEN SATURATION: 98 % | HEART RATE: 106 BPM | DIASTOLIC BLOOD PRESSURE: 82 MMHG

## 2020-01-01 VITALS
TEMPERATURE: 97.7 F | WEIGHT: 117.6 LBS | HEIGHT: 59 IN | HEART RATE: 114 BPM | BODY MASS INDEX: 23.71 KG/M2 | OXYGEN SATURATION: 99 %

## 2020-01-01 VITALS
WEIGHT: 123.2 LBS | TEMPERATURE: 97.1 F | HEART RATE: 111 BPM | SYSTOLIC BLOOD PRESSURE: 140 MMHG | BODY MASS INDEX: 24.84 KG/M2 | DIASTOLIC BLOOD PRESSURE: 70 MMHG | HEIGHT: 59 IN | OXYGEN SATURATION: 98 %

## 2020-01-01 VITALS
OXYGEN SATURATION: 98 % | BODY MASS INDEX: 21.71 KG/M2 | WEIGHT: 107.7 LBS | DIASTOLIC BLOOD PRESSURE: 66 MMHG | SYSTOLIC BLOOD PRESSURE: 118 MMHG | HEART RATE: 134 BPM | HEIGHT: 59 IN | TEMPERATURE: 98 F

## 2020-01-01 VITALS
BODY MASS INDEX: 25.25 KG/M2 | OXYGEN SATURATION: 97 % | SYSTOLIC BLOOD PRESSURE: 130 MMHG | TEMPERATURE: 98.2 F | WEIGHT: 125 LBS | HEART RATE: 105 BPM | DIASTOLIC BLOOD PRESSURE: 78 MMHG

## 2020-01-01 VITALS
DIASTOLIC BLOOD PRESSURE: 76 MMHG | WEIGHT: 102.4 LBS | TEMPERATURE: 98.1 F | HEART RATE: 101 BPM | BODY MASS INDEX: 20.68 KG/M2 | SYSTOLIC BLOOD PRESSURE: 158 MMHG

## 2020-01-01 VITALS
TEMPERATURE: 98.2 F | WEIGHT: 119 LBS | HEIGHT: 59 IN | HEART RATE: 102 BPM | DIASTOLIC BLOOD PRESSURE: 80 MMHG | OXYGEN SATURATION: 96 % | SYSTOLIC BLOOD PRESSURE: 140 MMHG | BODY MASS INDEX: 23.99 KG/M2

## 2020-01-01 VITALS
OXYGEN SATURATION: 90 % | HEIGHT: 59 IN | SYSTOLIC BLOOD PRESSURE: 134 MMHG | HEART RATE: 88 BPM | RESPIRATION RATE: 16 BRPM | TEMPERATURE: 97 F | BODY MASS INDEX: 23.59 KG/M2 | DIASTOLIC BLOOD PRESSURE: 56 MMHG | WEIGHT: 117 LBS

## 2020-01-01 VITALS
DIASTOLIC BLOOD PRESSURE: 80 MMHG | HEART RATE: 91 BPM | RESPIRATION RATE: 17 BRPM | TEMPERATURE: 99.3 F | SYSTOLIC BLOOD PRESSURE: 157 MMHG

## 2020-01-01 VITALS — BODY MASS INDEX: 21.97 KG/M2 | HEIGHT: 59 IN

## 2020-01-01 VITALS
SYSTOLIC BLOOD PRESSURE: 140 MMHG | HEART RATE: 111 BPM | WEIGHT: 109 LBS | DIASTOLIC BLOOD PRESSURE: 78 MMHG | TEMPERATURE: 98.2 F | BODY MASS INDEX: 22.02 KG/M2

## 2020-01-01 VITALS
WEIGHT: 117 LBS | TEMPERATURE: 97.3 F | HEIGHT: 59 IN | BODY MASS INDEX: 23.59 KG/M2 | DIASTOLIC BLOOD PRESSURE: 60 MMHG | SYSTOLIC BLOOD PRESSURE: 110 MMHG

## 2020-01-01 VITALS
HEART RATE: 103 BPM | RESPIRATION RATE: 18 BRPM | OXYGEN SATURATION: 98 % | HEART RATE: 72 BPM | BODY MASS INDEX: 25.45 KG/M2 | DIASTOLIC BLOOD PRESSURE: 70 MMHG | SYSTOLIC BLOOD PRESSURE: 136 MMHG | DIASTOLIC BLOOD PRESSURE: 82 MMHG | TEMPERATURE: 97.5 F | TEMPERATURE: 98.5 F | WEIGHT: 126 LBS | SYSTOLIC BLOOD PRESSURE: 122 MMHG

## 2020-01-01 VITALS
SYSTOLIC BLOOD PRESSURE: 99 MMHG | DIASTOLIC BLOOD PRESSURE: 57 MMHG | DIASTOLIC BLOOD PRESSURE: 72 MMHG | OXYGEN SATURATION: 98 % | TEMPERATURE: 97 F | RESPIRATION RATE: 13 BRPM | TEMPERATURE: 97 F | OXYGEN SATURATION: 98 % | SYSTOLIC BLOOD PRESSURE: 129 MMHG

## 2020-01-01 VITALS — HEIGHT: 59 IN | BODY MASS INDEX: 22.02 KG/M2

## 2020-01-01 DIAGNOSIS — I87.8 POOR VENOUS ACCESS: ICD-10-CM

## 2020-01-01 DIAGNOSIS — E83.52 HYPERCALCEMIA: ICD-10-CM

## 2020-01-01 DIAGNOSIS — C83.00 LYMPHOMA, SMALL LYMPHOCYTIC (HCC): ICD-10-CM

## 2020-01-01 DIAGNOSIS — D61.810 ANTINEOPLASTIC CHEMOTHERAPY INDUCED PANCYTOPENIA (CODE) (HCC): Primary | ICD-10-CM

## 2020-01-01 DIAGNOSIS — C83.30 DIFFUSE LARGE B-CELL LYMPHOMA, UNSPECIFIED BODY REGION (HCC): ICD-10-CM

## 2020-01-01 DIAGNOSIS — D61.810 ANTINEOPLASTIC CHEMOTHERAPY INDUCED PANCYTOPENIA (CODE) (HCC): ICD-10-CM

## 2020-01-01 DIAGNOSIS — C83.00 LYMPHOMA, SMALL LYMPHOCYTIC (HCC): Primary | ICD-10-CM

## 2020-01-01 DIAGNOSIS — C83.38 DIFFUSE LARGE B-CELL LYMPHOMA, LYMPH NODES OF MULTIPLE SITES (HCC): ICD-10-CM

## 2020-01-01 DIAGNOSIS — C83.30 DIFFUSE LARGE B-CELL LYMPHOMA, UNSPECIFIED BODY REGION (HCC): Primary | ICD-10-CM

## 2020-01-01 DIAGNOSIS — I10 ESSENTIAL HYPERTENSION: ICD-10-CM

## 2020-01-01 DIAGNOSIS — R73.01 IFG (IMPAIRED FASTING GLUCOSE): ICD-10-CM

## 2020-01-01 DIAGNOSIS — C85.15 UNSPECIFIED B-CELL LYMPHOMA, LYMPH NODES OF INGUINAL REGION AND LOWER LIMB (HCC): ICD-10-CM

## 2020-01-01 DIAGNOSIS — E83.52 HYPERCALCEMIA: Primary | ICD-10-CM

## 2020-01-01 DIAGNOSIS — E78.00 PURE HYPERCHOLESTEROLEMIA: ICD-10-CM

## 2020-01-01 DIAGNOSIS — M81.6 LOCALIZED OSTEOPOROSIS WITHOUT CURRENT PATHOLOGICAL FRACTURE: ICD-10-CM

## 2020-01-01 LAB
ABO/RH: NORMAL
ALBUMIN SERPL-MCNC: 3.3 G/DL (ref 3.5–5.2)
ALBUMIN SERPL-MCNC: 3.7 G/DL (ref 3.5–5.2)
ALBUMIN SERPL-MCNC: 3.8 G/DL (ref 3.5–5.2)
ALBUMIN SERPL-MCNC: 3.9 G/DL (ref 3.5–5.2)
ALBUMIN SERPL-MCNC: 4 G/DL (ref 3.5–5.2)
ALBUMIN SERPL-MCNC: 4.2 G/DL (ref 3.5–5.2)
ALBUMIN SERPL-MCNC: 4.2 G/DL (ref 3.5–5.2)
ALBUMIN SERPL-MCNC: 4.3 G/DL (ref 3.5–5.2)
ALBUMIN SERPL-MCNC: 4.3 G/DL (ref 3.5–5.2)
ALBUMIN SERPL-MCNC: 4.7 G/DL (ref 3.5–5.2)
ALBUMIN SERPL-MCNC: 4.8 G/DL (ref 3.5–5.2)
ALP BLD-CCNC: 129 U/L (ref 35–104)
ALP BLD-CCNC: 129 U/L (ref 35–104)
ALP BLD-CCNC: 135 U/L (ref 35–104)
ALP BLD-CCNC: 140 U/L (ref 35–104)
ALP BLD-CCNC: 143 U/L (ref 35–104)
ALP BLD-CCNC: 161 U/L (ref 35–104)
ALP BLD-CCNC: 168 U/L (ref 35–104)
ALP BLD-CCNC: 173 U/L (ref 35–104)
ALP BLD-CCNC: 214 U/L (ref 35–104)
ALP BLD-CCNC: 218 U/L (ref 35–104)
ALP BLD-CCNC: 239 U/L (ref 35–104)
ALT SERPL-CCNC: 30 U/L (ref 5–33)
ALT SERPL-CCNC: 34 U/L (ref 9–52)
ALT SERPL-CCNC: 34 U/L (ref 9–52)
ALT SERPL-CCNC: 35 U/L (ref 9–52)
ALT SERPL-CCNC: 39 U/L (ref 9–52)
ALT SERPL-CCNC: 42 U/L (ref 9–52)
ALT SERPL-CCNC: 44 U/L (ref 5–33)
ALT SERPL-CCNC: 51 U/L (ref 9–52)
ALT SERPL-CCNC: 55 U/L (ref 9–52)
ALT SERPL-CCNC: 57 U/L (ref 9–52)
ALT SERPL-CCNC: 69 U/L (ref 9–52)
ANION GAP SERPL CALCULATED.3IONS-SCNC: 11 MMOL/L (ref 7–19)
ANION GAP SERPL CALCULATED.3IONS-SCNC: 11 MMOL/L (ref 7–19)
ANION GAP SERPL CALCULATED.3IONS-SCNC: 12 MMOL/L (ref 7–19)
ANION GAP SERPL CALCULATED.3IONS-SCNC: 12 MMOL/L (ref 7–19)
ANION GAP SERPL CALCULATED.3IONS-SCNC: 13 MMOL/L (ref 7–19)
ANION GAP SERPL CALCULATED.3IONS-SCNC: 13 MMOL/L (ref 7–19)
ANION GAP SERPL CALCULATED.3IONS-SCNC: 15 MMOL/L (ref 7–19)
ANION GAP SERPL CALCULATED.3IONS-SCNC: 17 MMOL/L (ref 7–19)
ANION GAP SERPL CALCULATED.3IONS-SCNC: 7 MMOL/L (ref 7–19)
ANION GAP SERPL CALCULATED.3IONS-SCNC: 7 MMOL/L (ref 7–19)
ANION GAP SERPL CALCULATED.3IONS-SCNC: 8 MMOL/L (ref 7–19)
ANION GAP SERPL CALCULATED.3IONS-SCNC: 8 MMOL/L (ref 7–19)
ANION GAP SERPL CALCULATED.3IONS-SCNC: 9 MMOL/L (ref 7–19)
ANION GAP SERPL CALCULATED.3IONS-SCNC: 9 MMOL/L (ref 7–19)
ANTIBODY SCREEN: NORMAL
AST SERPL-CCNC: 35 U/L (ref 14–36)
AST SERPL-CCNC: 40 U/L (ref 5–32)
AST SERPL-CCNC: 44 U/L (ref 14–36)
AST SERPL-CCNC: 45 U/L (ref 14–36)
AST SERPL-CCNC: 46 U/L (ref 14–36)
AST SERPL-CCNC: 47 U/L (ref 14–36)
AST SERPL-CCNC: 48 U/L (ref 14–36)
AST SERPL-CCNC: 49 U/L (ref 14–36)
AST SERPL-CCNC: 52 U/L (ref 14–36)
AST SERPL-CCNC: 55 U/L (ref 5–32)
AST SERPL-CCNC: 59 U/L (ref 14–36)
BASOPHILS ABSOLUTE: 0 K/UL (ref 0.01–0.08)
BASOPHILS ABSOLUTE: 0 K/UL (ref 0.01–0.08)
BASOPHILS ABSOLUTE: 0 K/UL (ref 0–0.2)
BASOPHILS ABSOLUTE: 0.01 K/UL (ref 0.01–0.08)
BASOPHILS ABSOLUTE: 0.03 K/UL (ref 0.01–0.08)
BASOPHILS ABSOLUTE: 0.03 K/UL (ref 0.01–0.08)
BASOPHILS ABSOLUTE: 0.09 K/UL (ref 0.01–0.08)
BASOPHILS RELATIVE PERCENT: 0 % (ref 0.1–1.2)
BASOPHILS RELATIVE PERCENT: 0 % (ref 0.1–1.2)
BASOPHILS RELATIVE PERCENT: 0.1 % (ref 0.1–1.2)
BASOPHILS RELATIVE PERCENT: 0.2 % (ref 0.1–1.2)
BASOPHILS RELATIVE PERCENT: 0.3 % (ref 0.1–1.2)
BASOPHILS RELATIVE PERCENT: 0.4 % (ref 0.1–1.2)
BASOPHILS RELATIVE PERCENT: 0.4 % (ref 0–1)
BASOPHILS RELATIVE PERCENT: 0.8 % (ref 0.1–1.2)
BASOPHILS RELATIVE PERCENT: 0.9 % (ref 0.1–1.2)
BASOPHILS RELATIVE PERCENT: 1.1 % (ref 0.1–1.2)
BILIRUB SERPL-MCNC: 0.3 MG/DL (ref 0.2–1.3)
BILIRUB SERPL-MCNC: 0.3 MG/DL (ref 0.2–1.3)
BILIRUB SERPL-MCNC: 0.4 MG/DL (ref 0.2–1.3)
BILIRUB SERPL-MCNC: 0.5 MG/DL (ref 0.2–1.2)
BILIRUB SERPL-MCNC: 0.5 MG/DL (ref 0.2–1.3)
BILIRUB SERPL-MCNC: 0.6 MG/DL (ref 0.2–1.2)
BILIRUB SERPL-MCNC: 0.6 MG/DL (ref 0.2–1.3)
BILIRUBIN URINE: NEGATIVE
BLOOD BANK DISPENSE STATUS: NORMAL
BLOOD BANK PRODUCT CODE: NORMAL
BLOOD, URINE: NEGATIVE
BPU ID: NORMAL
BUN BLDV-MCNC: 12 MG/DL (ref 8–23)
BUN BLDV-MCNC: 13 MG/DL (ref 7–17)
BUN BLDV-MCNC: 13 MG/DL (ref 8–23)
BUN BLDV-MCNC: 13 MG/DL (ref 8–23)
BUN BLDV-MCNC: 14 MG/DL (ref 7–17)
BUN BLDV-MCNC: 16 MG/DL (ref 8–23)
BUN BLDV-MCNC: 17 MG/DL (ref 7–17)
BUN BLDV-MCNC: 17 MG/DL (ref 8–23)
BUN BLDV-MCNC: 18 MG/DL (ref 7–17)
BUN BLDV-MCNC: 19 MG/DL (ref 7–17)
BUN BLDV-MCNC: 19 MG/DL (ref 7–17)
BUN BLDV-MCNC: 21 MG/DL (ref 7–17)
BUN BLDV-MCNC: 21 MG/DL (ref 7–17)
BUN BLDV-MCNC: 23 MG/DL (ref 7–17)
CALCIUM SERPL-MCNC: 10.2 MG/DL (ref 8.4–10.2)
CALCIUM SERPL-MCNC: 10.2 MG/DL (ref 8.4–10.2)
CALCIUM SERPL-MCNC: 10.3 MG/DL (ref 8.4–10.2)
CALCIUM SERPL-MCNC: 10.3 MG/DL (ref 8.8–10.2)
CALCIUM SERPL-MCNC: 10.4 MG/DL (ref 8.8–10.2)
CALCIUM SERPL-MCNC: 10.6 MG/DL (ref 8.4–10.2)
CALCIUM SERPL-MCNC: 11 MG/DL (ref 8.8–10.2)
CALCIUM SERPL-MCNC: 12.2 MG/DL (ref 8.8–10.2)
CALCIUM SERPL-MCNC: 13.9 MG/DL (ref 8.8–10.2)
CALCIUM SERPL-MCNC: 9.3 MG/DL (ref 8.4–10.2)
CALCIUM SERPL-MCNC: 9.5 MG/DL (ref 8.4–10.2)
CALCIUM SERPL-MCNC: 9.7 MG/DL (ref 8.4–10.2)
CALCIUM SERPL-MCNC: 9.9 MG/DL (ref 8.4–10.2)
CALCIUM SERPL-MCNC: 9.9 MG/DL (ref 8.4–10.2)
CHLORIDE BLD-SCNC: 100 MMOL/L (ref 98–111)
CHLORIDE BLD-SCNC: 102 MMOL/L (ref 98–111)
CHLORIDE BLD-SCNC: 103 MMOL/L (ref 98–111)
CHLORIDE BLD-SCNC: 86 MMOL/L (ref 98–111)
CHLORIDE BLD-SCNC: 95 MMOL/L (ref 98–111)
CHLORIDE BLD-SCNC: 95 MMOL/L (ref 98–111)
CHLORIDE BLD-SCNC: 97 MMOL/L (ref 98–111)
CHLORIDE BLD-SCNC: 97 MMOL/L (ref 98–111)
CHLORIDE BLD-SCNC: 98 MMOL/L (ref 98–111)
CHLORIDE BLD-SCNC: 99 MMOL/L (ref 98–111)
CHOLESTEROL, TOTAL: 201 MG/DL (ref 160–199)
CLARITY: CLEAR
CO2: 23 MMOL/L (ref 22–29)
CO2: 24 MMOL/L (ref 22–29)
CO2: 24 MMOL/L (ref 22–29)
CO2: 25 MMOL/L (ref 22–29)
CO2: 26 MMOL/L (ref 22–29)
CO2: 27 MMOL/L (ref 22–29)
CO2: 27 MMOL/L (ref 22–29)
CO2: 28 MMOL/L (ref 22–29)
CO2: 29 MMOL/L (ref 22–29)
COLOR: YELLOW
CREAT SERPL-MCNC: 0.4 MG/DL (ref 0.5–0.9)
CREAT SERPL-MCNC: 0.5 MG/DL (ref 0.5–0.9)
CREAT SERPL-MCNC: 0.5 MG/DL (ref 0.5–0.9)
CREAT SERPL-MCNC: 0.5 MG/DL (ref 0.5–1)
CREAT SERPL-MCNC: 0.5 MG/DL (ref 0.5–1)
CREAT SERPL-MCNC: 0.6 MG/DL (ref 0.5–0.9)
CREAT SERPL-MCNC: 0.6 MG/DL (ref 0.5–0.9)
CREAT SERPL-MCNC: 0.7 MG/DL (ref 0.5–1)
CREAT SERPL-MCNC: <0.5 MG/DL (ref 0.5–1)
DESCRIPTION BLOOD BANK: NORMAL
EKG P AXIS: 21 DEGREES
EKG P AXIS: 26 DEGREES
EKG P-R INTERVAL: 112 MS
EKG P-R INTERVAL: 84 MS
EKG Q-T INTERVAL: 342 MS
EKG Q-T INTERVAL: 346 MS
EKG QRS DURATION: 112 MS
EKG QRS DURATION: 120 MS
EKG QTC CALCULATION (BAZETT): 410 MS
EKG QTC CALCULATION (BAZETT): 419 MS
EKG T AXIS: 24 DEGREES
EKG T AXIS: 9 DEGREES
EOSINOPHILS ABSOLUTE: 0 K/UL (ref 0.04–0.54)
EOSINOPHILS ABSOLUTE: 0 K/UL (ref 0.04–0.54)
EOSINOPHILS ABSOLUTE: 0 K/UL (ref 0–0.6)
EOSINOPHILS ABSOLUTE: 0.02 K/UL (ref 0.04–0.54)
EOSINOPHILS ABSOLUTE: 0.02 K/UL (ref 0.04–0.54)
EOSINOPHILS ABSOLUTE: 0.03 K/UL (ref 0.04–0.54)
EOSINOPHILS ABSOLUTE: 0.05 K/UL (ref 0.04–0.54)
EOSINOPHILS ABSOLUTE: 0.07 K/UL (ref 0.04–0.54)
EOSINOPHILS ABSOLUTE: 0.11 K/UL (ref 0.04–0.54)
EOSINOPHILS ABSOLUTE: 0.11 K/UL (ref 0.04–0.54)
EOSINOPHILS ABSOLUTE: 0.26 K/UL (ref 0.04–0.54)
EOSINOPHILS RELATIVE PERCENT: 0 % (ref 0.7–7)
EOSINOPHILS RELATIVE PERCENT: 0 % (ref 0.7–7)
EOSINOPHILS RELATIVE PERCENT: 0 % (ref 0–5)
EOSINOPHILS RELATIVE PERCENT: 0.5 % (ref 0.7–7)
EOSINOPHILS RELATIVE PERCENT: 0.6 % (ref 0.7–7)
EOSINOPHILS RELATIVE PERCENT: 1.1 % (ref 0.7–7)
EOSINOPHILS RELATIVE PERCENT: 1.2 % (ref 0.7–7)
EOSINOPHILS RELATIVE PERCENT: 1.4 % (ref 0.7–7)
EOSINOPHILS RELATIVE PERCENT: 1.9 % (ref 0.7–7)
EOSINOPHILS RELATIVE PERCENT: 2.2 % (ref 0.7–7)
EOSINOPHILS RELATIVE PERCENT: 2.7 % (ref 0.7–7)
GFR AFRICAN AMERICAN: >59
GFR NON-AFRICAN AMERICAN: >60
GLOBULIN: 2.2 G/DL
GLOBULIN: 2.7 G/DL
GLOBULIN: 2.7 G/DL
GLOBULIN: 2.8 G/DL
GLOBULIN: 3.2 G/DL
GLOBULIN: 8 G/DL
GLUCOSE BLD-MCNC: 103 MG/DL (ref 74–106)
GLUCOSE BLD-MCNC: 106 MG/DL (ref 74–109)
GLUCOSE BLD-MCNC: 115 MG/DL (ref 70–99)
GLUCOSE BLD-MCNC: 116 MG/DL (ref 74–109)
GLUCOSE BLD-MCNC: 122 MG/DL (ref 74–106)
GLUCOSE BLD-MCNC: 123 MG/DL (ref 74–106)
GLUCOSE BLD-MCNC: 130 MG/DL (ref 74–109)
GLUCOSE BLD-MCNC: 131 MG/DL (ref 74–106)
GLUCOSE BLD-MCNC: 146 MG/DL (ref 74–106)
GLUCOSE BLD-MCNC: 228 MG/DL (ref 74–106)
GLUCOSE BLD-MCNC: 69 MG/DL (ref 74–109)
GLUCOSE BLD-MCNC: 85 MG/DL (ref 74–109)
GLUCOSE BLD-MCNC: 91 MG/DL (ref 74–106)
GLUCOSE BLD-MCNC: 97 MG/DL (ref 74–106)
GLUCOSE BLD-MCNC: 98 MG/DL (ref 74–106)
GLUCOSE URINE: NEGATIVE MG/DL
HBA1C MFR BLD: 4.8 % (ref 4–6)
HCT VFR BLD CALC: 23.8 % (ref 34.1–44.9)
HCT VFR BLD CALC: 25.3 % (ref 34.1–44.9)
HCT VFR BLD CALC: 25.3 % (ref 34.1–44.9)
HCT VFR BLD CALC: 25.9 % (ref 34.1–44.9)
HCT VFR BLD CALC: 26.3 % (ref 34.1–44.9)
HCT VFR BLD CALC: 31.5 % (ref 34.1–44.9)
HCT VFR BLD CALC: 31.6 % (ref 34.1–44.9)
HCT VFR BLD CALC: 32.2 % (ref 34.1–44.9)
HCT VFR BLD CALC: 32.4 % (ref 34.1–44.9)
HCT VFR BLD CALC: 33.9 % (ref 37–47)
HCT VFR BLD CALC: 34.9 % (ref 34.1–44.9)
HCT VFR BLD CALC: 36.7 % (ref 34.1–44.9)
HCT VFR BLD CALC: 38.8 % (ref 37–47)
HCT VFR BLD CALC: 39 % (ref 34.1–44.9)
HDLC SERPL-MCNC: 57 MG/DL (ref 65–121)
HEMOGLOBIN: 10.2 G/DL (ref 11.2–15.7)
HEMOGLOBIN: 10.4 G/DL (ref 11.2–15.7)
HEMOGLOBIN: 11.1 G/DL (ref 11.2–15.7)
HEMOGLOBIN: 11.3 G/DL (ref 11.2–15.7)
HEMOGLOBIN: 11.3 G/DL (ref 12–16)
HEMOGLOBIN: 12.2 G/DL (ref 11.2–15.7)
HEMOGLOBIN: 12.3 G/DL (ref 11.2–15.7)
HEMOGLOBIN: 12.9 G/DL (ref 11.2–15.7)
HEMOGLOBIN: 12.9 G/DL (ref 12–16)
HEMOGLOBIN: 7.9 G/DL (ref 11.2–15.7)
HEMOGLOBIN: 8.7 G/DL (ref 11.2–15.7)
HEMOGLOBIN: 8.7 G/DL (ref 11.2–15.7)
HEMOGLOBIN: 8.9 G/DL (ref 11.2–15.7)
HEMOGLOBIN: 9.4 G/DL (ref 11.2–15.7)
IMMATURE GRANULOCYTES #: 0 K/UL
KETONES, URINE: NEGATIVE MG/DL
LACTATE DEHYDROGENASE: 1234 U/L (ref 313–618)
LACTATE DEHYDROGENASE: 276 U/L (ref 313–618)
LACTATE DEHYDROGENASE: 665 U/L (ref 313–618)
LACTATE DEHYDROGENASE: 936 U/L (ref 313–618)
LACTATE DEHYDROGENASE: 942 U/L (ref 313–618)
LDL CHOLESTEROL CALCULATED: 126 MG/DL
LEUKOCYTE ESTERASE, URINE: NEGATIVE
LYMPHOCYTES ABSOLUTE: 0.24 K/UL (ref 1.18–3.74)
LYMPHOCYTES ABSOLUTE: 0.33 K/UL (ref 1.18–3.74)
LYMPHOCYTES ABSOLUTE: 0.36 K/UL (ref 1.18–3.74)
LYMPHOCYTES ABSOLUTE: 0.37 K/UL (ref 1.18–3.74)
LYMPHOCYTES ABSOLUTE: 0.44 K/UL (ref 1.18–3.74)
LYMPHOCYTES ABSOLUTE: 0.56 K/UL (ref 1.18–3.74)
LYMPHOCYTES ABSOLUTE: 0.66 K/UL (ref 1.18–3.74)
LYMPHOCYTES ABSOLUTE: 0.8 K/UL (ref 1.1–4.5)
LYMPHOCYTES ABSOLUTE: 0.98 K/UL (ref 1.18–3.74)
LYMPHOCYTES ABSOLUTE: 1.26 K/UL (ref 1.18–3.74)
LYMPHOCYTES ABSOLUTE: 1.54 K/UL (ref 1.18–3.74)
LYMPHOCYTES ABSOLUTE: 1.66 K/UL (ref 1.18–3.74)
LYMPHOCYTES ABSOLUTE: 2.17 K/UL (ref 1.18–3.74)
LYMPHOCYTES RELATIVE PERCENT: 10.2 % (ref 19.3–53.1)
LYMPHOCYTES RELATIVE PERCENT: 10.5 % (ref 19.3–53.1)
LYMPHOCYTES RELATIVE PERCENT: 10.5 % (ref 19.3–53.1)
LYMPHOCYTES RELATIVE PERCENT: 10.9 % (ref 19.3–53.1)
LYMPHOCYTES RELATIVE PERCENT: 12.2 % (ref 19.3–53.1)
LYMPHOCYTES RELATIVE PERCENT: 15.1 % (ref 20–40)
LYMPHOCYTES RELATIVE PERCENT: 18 % (ref 19.3–53.1)
LYMPHOCYTES RELATIVE PERCENT: 21 % (ref 19.3–53.1)
LYMPHOCYTES RELATIVE PERCENT: 22.5 % (ref 19.3–53.1)
LYMPHOCYTES RELATIVE PERCENT: 28.4 % (ref 19.3–53.1)
LYMPHOCYTES RELATIVE PERCENT: 35.6 % (ref 19.3–53.1)
LYMPHOCYTES RELATIVE PERCENT: 68.5 % (ref 19.3–53.1)
LYMPHOCYTES RELATIVE PERCENT: 8.4 % (ref 19.3–53.1)
MAGNESIUM: 1.3 MG/DL (ref 1.6–2.4)
MAGNESIUM: 1.4 MG/DL (ref 1.6–2.4)
MAGNESIUM: 1.5 MG/DL (ref 1.6–2.3)
MAGNESIUM: 1.6 MG/DL (ref 1.6–2.3)
MAGNESIUM: 1.7 MG/DL (ref 1.6–2.3)
MAGNESIUM: 1.8 MG/DL (ref 1.6–2.3)
MAGNESIUM: 1.8 MG/DL (ref 1.6–2.3)
MAGNESIUM: 2 MG/DL (ref 1.6–2.4)
MCH RBC QN AUTO: 27.6 PG (ref 27–31)
MCH RBC QN AUTO: 27.8 PG (ref 27–31)
MCH RBC QN AUTO: 31.8 PG (ref 25.6–32.2)
MCH RBC QN AUTO: 32 PG (ref 25.6–32.2)
MCH RBC QN AUTO: 32.5 PG (ref 25.6–32.2)
MCH RBC QN AUTO: 32.6 PG (ref 25.6–32.2)
MCH RBC QN AUTO: 33.4 PG (ref 25.6–32.2)
MCH RBC QN AUTO: 33.5 PG (ref 25.6–32.2)
MCH RBC QN AUTO: 33.9 PG (ref 25.6–32.2)
MCH RBC QN AUTO: 34.1 PG (ref 25.6–32.2)
MCH RBC QN AUTO: 34.3 PG (ref 25.6–32.2)
MCH RBC QN AUTO: 34.6 PG (ref 25.6–32.2)
MCH RBC QN AUTO: 35.2 PG (ref 25.6–32.2)
MCH RBC QN AUTO: 35.4 PG (ref 25.6–32.2)
MCHC RBC AUTO-ENTMCNC: 32.1 G/DL (ref 32.3–35.5)
MCHC RBC AUTO-ENTMCNC: 32.4 G/DL (ref 32.3–35.5)
MCHC RBC AUTO-ENTMCNC: 33.1 G/DL (ref 32.3–35.5)
MCHC RBC AUTO-ENTMCNC: 33.2 G/DL (ref 32.3–35.5)
MCHC RBC AUTO-ENTMCNC: 33.2 G/DL (ref 32.3–35.5)
MCHC RBC AUTO-ENTMCNC: 33.2 G/DL (ref 33–37)
MCHC RBC AUTO-ENTMCNC: 33.3 G/DL (ref 33–37)
MCHC RBC AUTO-ENTMCNC: 33.8 G/DL (ref 32.3–35.5)
MCHC RBC AUTO-ENTMCNC: 34.4 G/DL (ref 32.3–35.5)
MCHC RBC AUTO-ENTMCNC: 34.4 G/DL (ref 32.3–35.5)
MCHC RBC AUTO-ENTMCNC: 34.5 G/DL (ref 32.3–35.5)
MCHC RBC AUTO-ENTMCNC: 35.2 G/DL (ref 32.3–35.5)
MCHC RBC AUTO-ENTMCNC: 35.8 G/DL (ref 32.3–35.5)
MCHC RBC AUTO-ENTMCNC: 36.3 G/DL (ref 32.3–35.5)
MCV RBC AUTO: 102.5 FL (ref 79.4–94.8)
MCV RBC AUTO: 103.3 FL (ref 79.4–94.8)
MCV RBC AUTO: 104.4 FL (ref 79.4–94.8)
MCV RBC AUTO: 110.2 FL (ref 79.4–94.8)
MCV RBC AUTO: 83.1 FL (ref 81–99)
MCV RBC AUTO: 83.5 FL (ref 81–99)
MCV RBC AUTO: 90.9 FL (ref 79.4–94.8)
MCV RBC AUTO: 93.5 FL (ref 79.4–94.8)
MCV RBC AUTO: 93.9 FL (ref 79.4–94.8)
MCV RBC AUTO: 94.2 FL (ref 79.4–94.8)
MCV RBC AUTO: 97.3 FL (ref 79.4–94.8)
MCV RBC AUTO: 98.4 FL (ref 79.4–94.8)
MCV RBC AUTO: 98.5 FL (ref 79.4–94.8)
MCV RBC AUTO: 99.6 FL (ref 79.4–94.8)
MONOCYTES ABSOLUTE: 0.05 K/UL (ref 0.24–0.82)
MONOCYTES ABSOLUTE: 0.06 K/UL (ref 0.24–0.82)
MONOCYTES ABSOLUTE: 0.06 K/UL (ref 0.24–0.82)
MONOCYTES ABSOLUTE: 0.09 K/UL (ref 0.24–0.82)
MONOCYTES ABSOLUTE: 0.55 K/UL (ref 0.24–0.82)
MONOCYTES ABSOLUTE: 0.58 K/UL (ref 0.24–0.82)
MONOCYTES ABSOLUTE: 0.59 K/UL (ref 0.24–0.82)
MONOCYTES ABSOLUTE: 0.61 K/UL (ref 0.24–0.82)
MONOCYTES ABSOLUTE: 0.92 K/UL (ref 0.24–0.82)
MONOCYTES ABSOLUTE: 0.99 K/UL (ref 0.24–0.82)
MONOCYTES ABSOLUTE: 1 K/UL (ref 0–0.9)
MONOCYTES ABSOLUTE: 1.09 K/UL (ref 0.24–0.82)
MONOCYTES ABSOLUTE: 1.14 K/UL (ref 0.24–0.82)
MONOCYTES RELATIVE PERCENT: 1.5 % (ref 4.7–12.5)
MONOCYTES RELATIVE PERCENT: 1.7 % (ref 4.7–12.5)
MONOCYTES RELATIVE PERCENT: 10.7 % (ref 4.7–12.5)
MONOCYTES RELATIVE PERCENT: 11.8 % (ref 4.7–12.5)
MONOCYTES RELATIVE PERCENT: 13.6 % (ref 4.7–12.5)
MONOCYTES RELATIVE PERCENT: 13.6 % (ref 4.7–12.5)
MONOCYTES RELATIVE PERCENT: 13.8 % (ref 4.7–12.5)
MONOCYTES RELATIVE PERCENT: 15.7 % (ref 4.7–12.5)
MONOCYTES RELATIVE PERCENT: 16.5 % (ref 4.7–12.5)
MONOCYTES RELATIVE PERCENT: 16.7 % (ref 4.7–12.5)
MONOCYTES RELATIVE PERCENT: 16.7 % (ref 4.7–12.5)
MONOCYTES RELATIVE PERCENT: 19.6 % (ref 0–10)
MONOCYTES RELATIVE PERCENT: 2.1 % (ref 4.7–12.5)
NEUTROPHILS ABSOLUTE: 0.08 K/UL (ref 1.56–6.13)
NEUTROPHILS ABSOLUTE: 2.14 K/UL (ref 1.56–6.13)
NEUTROPHILS ABSOLUTE: 2.34 K/UL (ref 1.56–6.13)
NEUTROPHILS ABSOLUTE: 2.51 K/UL (ref 1.56–6.13)
NEUTROPHILS ABSOLUTE: 2.77 K/UL (ref 1.56–6.13)
NEUTROPHILS ABSOLUTE: 2.98 K/UL (ref 1.56–6.13)
NEUTROPHILS ABSOLUTE: 3.15 K/UL (ref 1.56–6.13)
NEUTROPHILS ABSOLUTE: 3.15 K/UL (ref 1.56–6.13)
NEUTROPHILS ABSOLUTE: 3.4 K/UL (ref 1.5–7.5)
NEUTROPHILS ABSOLUTE: 3.7 K/UL (ref 1.56–6.13)
NEUTROPHILS ABSOLUTE: 3.99 K/UL (ref 1.56–6.13)
NEUTROPHILS ABSOLUTE: 5.74 K/UL (ref 1.56–6.13)
NEUTROPHILS ABSOLUTE: 6.07 K/UL (ref 1.56–6.13)
NEUTROPHILS RELATIVE PERCENT: 14.8 % (ref 34–71.1)
NEUTROPHILS RELATIVE PERCENT: 49.4 % (ref 34–71.1)
NEUTROPHILS RELATIVE PERCENT: 53.8 % (ref 34–71.1)
NEUTROPHILS RELATIVE PERCENT: 61.8 % (ref 34–71.1)
NEUTROPHILS RELATIVE PERCENT: 62.9 % (ref 34–71.1)
NEUTROPHILS RELATIVE PERCENT: 64 % (ref 34–71.1)
NEUTROPHILS RELATIVE PERCENT: 64.7 % (ref 50–65)
NEUTROPHILS RELATIVE PERCENT: 71.7 % (ref 34–71.1)
NEUTROPHILS RELATIVE PERCENT: 75.2 % (ref 34–71.1)
NEUTROPHILS RELATIVE PERCENT: 77.6 % (ref 34–71.1)
NEUTROPHILS RELATIVE PERCENT: 85.8 % (ref 34–71.1)
NEUTROPHILS RELATIVE PERCENT: 86.9 % (ref 34–71.1)
NEUTROPHILS RELATIVE PERCENT: 88 % (ref 34–71.1)
NITRITE, URINE: NEGATIVE
PARATHYROID HORMONE INTACT: 9.5 PG/ML (ref 15–65)
PDW BLD-RTO: 12.7 % (ref 11.7–14.4)
PDW BLD-RTO: 13 % (ref 11.7–14.4)
PDW BLD-RTO: 13.1 % (ref 11.7–14.4)
PDW BLD-RTO: 15.7 % (ref 11.5–14.5)
PDW BLD-RTO: 15.7 % (ref 11.5–14.5)
PDW BLD-RTO: 15.8 % (ref 11.7–14.4)
PDW BLD-RTO: 16.4 % (ref 11.7–14.4)
PDW BLD-RTO: 16.5 % (ref 11.7–14.4)
PDW BLD-RTO: 17 % (ref 11.7–14.4)
PDW BLD-RTO: 17.1 % (ref 11.7–14.4)
PDW BLD-RTO: 17.4 % (ref 11.7–14.4)
PDW BLD-RTO: 18.3 % (ref 11.7–14.4)
PDW BLD-RTO: 18.5 % (ref 11.7–14.4)
PDW BLD-RTO: 18.6 % (ref 11.7–14.4)
PERFORMED ON: ABNORMAL
PH UA: 7 (ref 5–8)
PHOSPHORUS: 3.3 MG/DL (ref 2.5–4.5)
PHOSPHORUS: 3.5 MG/DL (ref 2.5–4.5)
PHOSPHORUS: 4.1 MG/DL (ref 2.5–4.5)
PHOSPHORUS: 4.2 MG/DL (ref 2.5–4.5)
PLATELET # BLD: 105 K/UL (ref 130–400)
PLATELET # BLD: 107 K/UL (ref 182–369)
PLATELET # BLD: 113 K/UL (ref 182–369)
PLATELET # BLD: 117 K/UL (ref 182–369)
PLATELET # BLD: 138 K/UL (ref 130–400)
PLATELET # BLD: 32 K/UL (ref 182–369)
PLATELET # BLD: 34 K/UL (ref 182–369)
PLATELET # BLD: 44 K/UL (ref 182–369)
PLATELET # BLD: 53 K/UL (ref 182–369)
PLATELET # BLD: 53 K/UL (ref 182–369)
PLATELET # BLD: 65 K/UL (ref 182–369)
PLATELET # BLD: 75 K/UL (ref 182–369)
PLATELET # BLD: 8 K/UL (ref 182–369)
PLATELET # BLD: 87 K/UL (ref 182–369)
PMV BLD AUTO: 10.2 FL (ref 7.4–10.4)
PMV BLD AUTO: 10.2 FL (ref 7.4–10.4)
PMV BLD AUTO: 10.4 FL (ref 9.4–12.3)
PMV BLD AUTO: 10.5 FL (ref 7.4–10.4)
PMV BLD AUTO: 10.5 FL (ref 7.4–10.4)
PMV BLD AUTO: 10.6 FL (ref 7.4–10.4)
PMV BLD AUTO: 10.7 FL (ref 7.4–10.4)
PMV BLD AUTO: 10.9 FL (ref 7.4–10.4)
PMV BLD AUTO: 11.2 FL (ref 9.4–12.3)
PMV BLD AUTO: 9.5 FL (ref 7.4–10.4)
PMV BLD AUTO: 9.7 FL (ref 7.4–10.4)
PMV BLD AUTO: 9.7 FL (ref 7.4–10.4)
PMV BLD AUTO: 9.9 FL (ref 7.4–10.4)
PMV BLD AUTO: ABNORMAL FL (ref 7.4–10.4)
POTASSIUM REFLEX MAGNESIUM: 3 MMOL/L (ref 3.5–5)
POTASSIUM REFLEX MAGNESIUM: 3.1 MMOL/L (ref 3.5–5)
POTASSIUM SERPL-SCNC: 3.3 MMOL/L (ref 3.5–5)
POTASSIUM SERPL-SCNC: 3.3 MMOL/L (ref 3.5–5)
POTASSIUM SERPL-SCNC: 3.3 MMOL/L (ref 3.5–5.1)
POTASSIUM SERPL-SCNC: 3.6 MMOL/L (ref 3.5–5)
POTASSIUM SERPL-SCNC: 3.7 MMOL/L (ref 3.5–5.1)
POTASSIUM SERPL-SCNC: 3.8 MMOL/L (ref 3.5–5.1)
POTASSIUM SERPL-SCNC: 3.9 MMOL/L (ref 3.5–5.1)
POTASSIUM SERPL-SCNC: 4 MMOL/L (ref 3.5–5.1)
POTASSIUM SERPL-SCNC: 4.1 MMOL/L (ref 3.5–5.1)
POTASSIUM SERPL-SCNC: 4.1 MMOL/L (ref 3.5–5.1)
POTASSIUM SERPL-SCNC: 4.2 MMOL/L (ref 3.5–5.1)
POTASSIUM SERPL-SCNC: 4.3 MMOL/L (ref 3.5–5.1)
PRO-BNP: 583 PG/ML (ref 0–900)
PROTEIN UA: NEGATIVE MG/DL
RBC # BLD: 2.28 M/UL (ref 3.93–5.22)
RBC # BLD: 2.54 M/UL (ref 3.93–5.22)
RBC # BLD: 2.6 M/UL (ref 3.93–5.22)
RBC # BLD: 2.77 M/UL (ref 3.93–5.22)
RBC # BLD: 2.8 M/UL (ref 3.93–5.22)
RBC # BLD: 2.94 M/UL (ref 3.93–5.22)
RBC # BLD: 3.05 M/UL (ref 3.93–5.22)
RBC # BLD: 3.21 M/UL (ref 3.93–5.22)
RBC # BLD: 3.42 M/UL (ref 3.93–5.22)
RBC # BLD: 3.58 M/UL (ref 3.93–5.22)
RBC # BLD: 3.84 M/UL (ref 3.93–5.22)
RBC # BLD: 3.96 M/UL (ref 3.93–5.22)
RBC # BLD: 4.06 M/UL (ref 4.2–5.4)
RBC # BLD: 4.67 M/UL (ref 4.2–5.4)
SARS-COV-2, PCR: NOT DETECTED
SODIUM BLD-SCNC: 129 MMOL/L (ref 136–145)
SODIUM BLD-SCNC: 131 MMOL/L (ref 137–145)
SODIUM BLD-SCNC: 132 MMOL/L (ref 136–145)
SODIUM BLD-SCNC: 133 MMOL/L (ref 137–145)
SODIUM BLD-SCNC: 133 MMOL/L (ref 137–145)
SODIUM BLD-SCNC: 134 MMOL/L (ref 136–145)
SODIUM BLD-SCNC: 134 MMOL/L (ref 137–145)
SODIUM BLD-SCNC: 135 MMOL/L (ref 137–145)
SODIUM BLD-SCNC: 137 MMOL/L (ref 137–145)
SODIUM BLD-SCNC: 138 MMOL/L (ref 137–145)
SODIUM BLD-SCNC: 139 MMOL/L (ref 136–145)
SODIUM BLD-SCNC: 139 MMOL/L (ref 137–145)
SODIUM BLD-SCNC: 139 MMOL/L (ref 137–145)
SODIUM BLD-SCNC: 140 MMOL/L (ref 136–145)
SPECIFIC GRAVITY UA: 1.01 (ref 1–1.03)
TOTAL PROTEIN: 5.9 G/DL (ref 6.3–8.2)
TOTAL PROTEIN: 6.1 G/DL (ref 6.3–8.2)
TOTAL PROTEIN: 6.2 G/DL (ref 6.3–8.2)
TOTAL PROTEIN: 6.3 G/DL (ref 6.3–8.2)
TOTAL PROTEIN: 6.5 G/DL (ref 6.6–8.7)
TOTAL PROTEIN: 6.7 G/DL (ref 6.3–8.2)
TOTAL PROTEIN: 6.8 G/DL (ref 6.3–8.2)
TOTAL PROTEIN: 7 G/DL (ref 6.3–8.2)
TOTAL PROTEIN: 7 G/DL (ref 6.3–8.2)
TOTAL PROTEIN: 7.2 G/DL (ref 6.6–8.7)
TOTAL PROTEIN: 7.4 G/DL (ref 6.3–8.2)
TRIGL SERPL-MCNC: 89 MG/DL (ref 0–149)
TSH SERPL DL<=0.05 MIU/L-ACNC: 3.12 UIU/ML (ref 0.27–4.2)
URIC ACID, SERUM: 1.1 MG/DL (ref 2.5–5.2)
URIC ACID, SERUM: 1.1 MG/DL (ref 2.5–5.2)
URIC ACID, SERUM: 2 MG/DL (ref 2.5–5.2)
URIC ACID, SERUM: 2.3 MG/DL (ref 2.5–5.2)
URIC ACID, SERUM: 2.4 MG/DL (ref 2.5–5.2)
URIC ACID, SERUM: 3.8 MG/DL (ref 2.5–5.2)
UROBILINOGEN, URINE: 0.2 E.U./DL
VITAMIN D 1,25-DIHYDROXY: 122 PG/ML (ref 19.9–79.3)
VITAMIN D 25-HYDROXY: 38.9 NG/ML
VITAMIN D 25-HYDROXY: 40.5 NG/ML
WBC # BLD: 0.54 K/UL (ref 3.98–10.04)
WBC # BLD: 2.85 K/UL (ref 3.98–10.04)
WBC # BLD: 3.23 K/UL (ref 3.98–10.04)
WBC # BLD: 3.43 K/UL (ref 3.98–10.04)
WBC # BLD: 3.66 K/UL (ref 3.98–10.04)
WBC # BLD: 4.19 K/UL (ref 3.98–10.04)
WBC # BLD: 4.33 K/UL (ref 3.98–10.04)
WBC # BLD: 4.4 K/UL (ref 4.8–10.8)
WBC # BLD: 5.14 K/UL (ref 3.98–10.04)
WBC # BLD: 5.3 K/UL (ref 4.8–10.8)
WBC # BLD: 5.85 K/UL (ref 3.98–10.04)
WBC # BLD: 5.99 K/UL (ref 3.98–10.04)
WBC # BLD: 8.01 K/UL (ref 3.98–10.04)
WBC # BLD: 9.65 K/UL (ref 3.98–10.04)

## 2020-01-01 PROCEDURE — 1036F TOBACCO NON-USER: CPT | Performed by: INTERNAL MEDICINE

## 2020-01-01 PROCEDURE — 97110 THERAPEUTIC EXERCISES: CPT

## 2020-01-01 PROCEDURE — 99214 OFFICE O/P EST MOD 30 MIN: CPT | Performed by: INTERNAL MEDICINE

## 2020-01-01 PROCEDURE — 1123F ACP DISCUSS/DSCN MKR DOCD: CPT | Performed by: PHYSICIAN ASSISTANT

## 2020-01-01 PROCEDURE — 96417 CHEMO IV INFUS EACH ADDL SEQ: CPT

## 2020-01-01 PROCEDURE — 85025 COMPLETE CBC W/AUTO DIFF WBC: CPT

## 2020-01-01 PROCEDURE — 74220 X-RAY XM ESOPHAGUS 1CNTRST: CPT

## 2020-01-01 PROCEDURE — 1123F ACP DISCUSS/DSCN MKR DOCD: CPT | Performed by: INTERNAL MEDICINE

## 2020-01-01 PROCEDURE — 96413 CHEMO IV INFUSION 1 HR: CPT

## 2020-01-01 PROCEDURE — 80053 COMPREHEN METABOLIC PANEL: CPT

## 2020-01-01 PROCEDURE — 84100 ASSAY OF PHOSPHORUS: CPT

## 2020-01-01 PROCEDURE — 74177 CT ABD & PELVIS W/CONTRAST: CPT

## 2020-01-01 PROCEDURE — G8399 PT W/DXA RESULTS DOCUMENT: HCPCS | Performed by: INTERNAL MEDICINE

## 2020-01-01 PROCEDURE — 36415 COLL VENOUS BLD VENIPUNCTURE: CPT

## 2020-01-01 PROCEDURE — 1090F PRES/ABSN URINE INCON ASSESS: CPT | Performed by: INTERNAL MEDICINE

## 2020-01-01 PROCEDURE — 96375 TX/PRO/DX INJ NEW DRUG ADDON: CPT

## 2020-01-01 PROCEDURE — 96377 APPLICATON ON-BODY INJECTOR: CPT

## 2020-01-01 PROCEDURE — 4040F PNEUMOC VAC/ADMIN/RCVD: CPT | Performed by: INTERNAL MEDICINE

## 2020-01-01 PROCEDURE — G8427 DOCREV CUR MEDS BY ELIG CLIN: HCPCS | Performed by: PHYSICIAN ASSISTANT

## 2020-01-01 PROCEDURE — 3017F COLORECTAL CA SCREEN DOC REV: CPT | Performed by: INTERNAL MEDICINE

## 2020-01-01 PROCEDURE — 96367 TX/PROPH/DG ADDL SEQ IV INF: CPT

## 2020-01-01 PROCEDURE — G8427 DOCREV CUR MEDS BY ELIG CLIN: HCPCS | Performed by: INTERNAL MEDICINE

## 2020-01-01 PROCEDURE — 1111F DSCHRG MED/CURRENT MED MERGE: CPT | Performed by: INTERNAL MEDICINE

## 2020-01-01 PROCEDURE — G8417 CALC BMI ABV UP PARAM F/U: HCPCS | Performed by: PHYSICIAN ASSISTANT

## 2020-01-01 PROCEDURE — 83615 LACTATE (LD) (LDH) ENZYME: CPT

## 2020-01-01 PROCEDURE — 83735 ASSAY OF MAGNESIUM: CPT

## 2020-01-01 PROCEDURE — 86900 BLOOD TYPING SEROLOGIC ABO: CPT

## 2020-01-01 PROCEDURE — 84550 ASSAY OF BLOOD/URIC ACID: CPT

## 2020-01-01 PROCEDURE — 6360000002 HC RX W HCPCS: Performed by: INTERNAL MEDICINE

## 2020-01-01 PROCEDURE — 6370000000 HC RX 637 (ALT 250 FOR IP): Performed by: INTERNAL MEDICINE

## 2020-01-01 PROCEDURE — 96415 CHEMO IV INFUSION ADDL HR: CPT

## 2020-01-01 PROCEDURE — 71045 X-RAY EXAM CHEST 1 VIEW: CPT

## 2020-01-01 PROCEDURE — 6360000004 HC RX CONTRAST MEDICATION: Performed by: SURGERY

## 2020-01-01 PROCEDURE — 6360000002 HC RX W HCPCS

## 2020-01-01 PROCEDURE — 2580000003 HC RX 258: Performed by: INTERNAL MEDICINE

## 2020-01-01 PROCEDURE — G8420 CALC BMI NORM PARAMETERS: HCPCS | Performed by: INTERNAL MEDICINE

## 2020-01-01 PROCEDURE — 2580000003 HC RX 258: Performed by: PHYSICIAN ASSISTANT

## 2020-01-01 PROCEDURE — 99232 SBSQ HOSP IP/OBS MODERATE 35: CPT | Performed by: INTERNAL MEDICINE

## 2020-01-01 PROCEDURE — 4040F PNEUMOC VAC/ADMIN/RCVD: CPT | Performed by: PHYSICIAN ASSISTANT

## 2020-01-01 PROCEDURE — 2580000003 HC RX 258: Performed by: SURGERY

## 2020-01-01 PROCEDURE — 80048 BASIC METABOLIC PNL TOTAL CA: CPT

## 2020-01-01 PROCEDURE — 6360000002 HC RX W HCPCS: Performed by: PHYSICIAN ASSISTANT

## 2020-01-01 PROCEDURE — 6360000004 HC RX CONTRAST MEDICATION: Performed by: INTERNAL MEDICINE

## 2020-01-01 PROCEDURE — 71260 CT THORAX DX C+: CPT

## 2020-01-01 PROCEDURE — G8420 CALC BMI NORM PARAMETERS: HCPCS | Performed by: PHYSICIAN ASSISTANT

## 2020-01-01 PROCEDURE — 86850 RBC ANTIBODY SCREEN: CPT

## 2020-01-01 PROCEDURE — A9552 F18 FDG: HCPCS | Performed by: INTERNAL MEDICINE

## 2020-01-01 PROCEDURE — 88329 PATH CONSLTJ DRG SURG: CPT

## 2020-01-01 PROCEDURE — 96366 THER/PROPH/DIAG IV INF ADDON: CPT

## 2020-01-01 PROCEDURE — 6360000002 HC RX W HCPCS: Performed by: SURGERY

## 2020-01-01 PROCEDURE — 76998 US GUIDE INTRAOP: CPT | Performed by: SURGERY

## 2020-01-01 PROCEDURE — 88305 TISSUE EXAM BY PATHOLOGIST: CPT

## 2020-01-01 PROCEDURE — 3209999900 FLUORO FOR SURGICAL PROCEDURES

## 2020-01-01 PROCEDURE — 99285 EMERGENCY DEPT VISIT HI MDM: CPT

## 2020-01-01 PROCEDURE — 3017F COLORECTAL CA SCREEN DOC REV: CPT | Performed by: PHYSICIAN ASSISTANT

## 2020-01-01 PROCEDURE — 6360000002 HC RX W HCPCS: Performed by: NURSE ANESTHETIST, CERTIFIED REGISTERED

## 2020-01-01 PROCEDURE — 3600000014 HC SURGERY LEVEL 4 ADDTL 15MIN: Performed by: SURGERY

## 2020-01-01 PROCEDURE — 2580000003 HC RX 258: Performed by: NURSE PRACTITIONER

## 2020-01-01 PROCEDURE — 99215 OFFICE O/P EST HI 40 MIN: CPT | Performed by: INTERNAL MEDICINE

## 2020-01-01 PROCEDURE — 7100000000 HC PACU RECOVERY - FIRST 15 MIN: Performed by: SURGERY

## 2020-01-01 PROCEDURE — 2709999900 HC NON-CHARGEABLE SUPPLY: Performed by: SURGERY

## 2020-01-01 PROCEDURE — 96365 THER/PROPH/DIAG IV INF INIT: CPT

## 2020-01-01 PROCEDURE — 38505 NEEDLE BIOPSY LYMPH NODES: CPT

## 2020-01-01 PROCEDURE — 96401 CHEMO ANTI-NEOPL SQ/IM: CPT

## 2020-01-01 PROCEDURE — 6370000000 HC RX 637 (ALT 250 FOR IP): Performed by: NURSE PRACTITIONER

## 2020-01-01 PROCEDURE — 3700000001 HC ADD 15 MINUTES (ANESTHESIA): Performed by: SURGERY

## 2020-01-01 PROCEDURE — 6370000000 HC RX 637 (ALT 250 FOR IP): Performed by: PHYSICIAN ASSISTANT

## 2020-01-01 PROCEDURE — 82947 ASSAY GLUCOSE BLOOD QUANT: CPT

## 2020-01-01 PROCEDURE — 2500000003 HC RX 250 WO HCPCS: Performed by: SURGERY

## 2020-01-01 PROCEDURE — 78815 PET IMAGE W/CT SKULL-THIGH: CPT

## 2020-01-01 PROCEDURE — 96523 IRRIG DRUG DELIVERY DEVICE: CPT

## 2020-01-01 PROCEDURE — 82306 VITAMIN D 25 HYDROXY: CPT

## 2020-01-01 PROCEDURE — 99999 PR OFFICE/OUTPT VISIT,PROCEDURE ONLY: CPT | Performed by: NURSE PRACTITIONER

## 2020-01-01 PROCEDURE — U0003 INFECTIOUS AGENT DETECTION BY NUCLEIC ACID (DNA OR RNA); SEVERE ACUTE RESPIRATORY SYNDROME CORONAVIRUS 2 (SARS-COV-2) (CORONAVIRUS DISEASE [COVID-19]), AMPLIFIED PROBE TECHNIQUE, MAKING USE OF HIGH THROUGHPUT TECHNOLOGIES AS DESCRIBED BY CMS-2020-01-R: HCPCS

## 2020-01-01 PROCEDURE — 3600000013 HC SURGERY LEVEL 3 ADDTL 15MIN: Performed by: SURGERY

## 2020-01-01 PROCEDURE — 99211 OFF/OP EST MAY X REQ PHY/QHP: CPT

## 2020-01-01 PROCEDURE — 96411 CHEMO IV PUSH ADDL DRUG: CPT

## 2020-01-01 PROCEDURE — 3600000003 HC SURGERY LEVEL 3 BASE: Performed by: SURGERY

## 2020-01-01 PROCEDURE — 82652 VIT D 1 25-DIHYDROXY: CPT

## 2020-01-01 PROCEDURE — 36430 TRANSFUSION BLD/BLD COMPNT: CPT

## 2020-01-01 PROCEDURE — 36561 INSERT TUNNELED CV CATH: CPT | Performed by: PHYSICIAN ASSISTANT

## 2020-01-01 PROCEDURE — G8484 FLU IMMUNIZE NO ADMIN: HCPCS | Performed by: INTERNAL MEDICINE

## 2020-01-01 PROCEDURE — 2580000003 HC RX 258: Performed by: ANESTHESIOLOGY

## 2020-01-01 PROCEDURE — G0008 ADMIN INFLUENZA VIRUS VAC: HCPCS | Performed by: INTERNAL MEDICINE

## 2020-01-01 PROCEDURE — 6360000002 HC RX W HCPCS: Performed by: NURSE PRACTITIONER

## 2020-01-01 PROCEDURE — 11104 PUNCH BX SKIN SINGLE LESION: CPT | Performed by: SURGERY

## 2020-01-01 PROCEDURE — 97161 PT EVAL LOW COMPLEX 20 MIN: CPT

## 2020-01-01 PROCEDURE — 90694 VACC AIIV4 NO PRSRV 0.5ML IM: CPT | Performed by: INTERNAL MEDICINE

## 2020-01-01 PROCEDURE — 7100000011 HC PHASE II RECOVERY - ADDTL 15 MIN: Performed by: SURGERY

## 2020-01-01 PROCEDURE — 1210000000 HC MED SURG R&B

## 2020-01-01 PROCEDURE — 6370000000 HC RX 637 (ALT 250 FOR IP): Performed by: HOSPITALIST

## 2020-01-01 PROCEDURE — C9113 INJ PANTOPRAZOLE SODIUM, VIA: HCPCS | Performed by: PHYSICIAN ASSISTANT

## 2020-01-01 PROCEDURE — G8428 CUR MEDS NOT DOCUMENT: HCPCS | Performed by: INTERNAL MEDICINE

## 2020-01-01 PROCEDURE — 88334 PATH CONSLTJ SURG CYTO XM EA: CPT

## 2020-01-01 PROCEDURE — 93005 ELECTROCARDIOGRAM TRACING: CPT | Performed by: NURSE PRACTITIONER

## 2020-01-01 PROCEDURE — 88333 PATH CONSLTJ SURG CYTO XM 1: CPT

## 2020-01-01 PROCEDURE — 2500000003 HC RX 250 WO HCPCS

## 2020-01-01 PROCEDURE — 77001 FLUOROGUIDE FOR VEIN DEVICE: CPT | Performed by: SURGERY

## 2020-01-01 PROCEDURE — 1036F TOBACCO NON-USER: CPT | Performed by: PHYSICIAN ASSISTANT

## 2020-01-01 PROCEDURE — 1090F PRES/ABSN URINE INCON ASSESS: CPT | Performed by: PHYSICIAN ASSISTANT

## 2020-01-01 PROCEDURE — 11401 EXC TR-EXT B9+MARG 0.6-1 CM: CPT | Performed by: SURGERY

## 2020-01-01 PROCEDURE — 1111F DSCHRG MED/CURRENT MED MERGE: CPT | Performed by: PHYSICIAN ASSISTANT

## 2020-01-01 PROCEDURE — 93005 ELECTROCARDIOGRAM TRACING: CPT | Performed by: SURGERY

## 2020-01-01 PROCEDURE — 36561 INSERT TUNNELED CV CATH: CPT | Performed by: SURGERY

## 2020-01-01 PROCEDURE — 99223 1ST HOSP IP/OBS HIGH 75: CPT | Performed by: INTERNAL MEDICINE

## 2020-01-01 PROCEDURE — 3600000004 HC SURGERY LEVEL 4 BASE: Performed by: SURGERY

## 2020-01-01 PROCEDURE — 96376 TX/PRO/DX INJ SAME DRUG ADON: CPT

## 2020-01-01 PROCEDURE — 83970 ASSAY OF PARATHORMONE: CPT

## 2020-01-01 PROCEDURE — P9073 PLATELETS PHERESIS PATH REDU: HCPCS

## 2020-01-01 PROCEDURE — 38505 NEEDLE BIOPSY LYMPH NODES: CPT | Performed by: SURGERY

## 2020-01-01 PROCEDURE — 6360000002 HC RX W HCPCS: Performed by: HOSPITALIST

## 2020-01-01 PROCEDURE — G8399 PT W/DXA RESULTS DOCUMENT: HCPCS | Performed by: PHYSICIAN ASSISTANT

## 2020-01-01 PROCEDURE — 76942 ECHO GUIDE FOR BIOPSY: CPT

## 2020-01-01 PROCEDURE — 3700000000 HC ANESTHESIA ATTENDED CARE: Performed by: SURGERY

## 2020-01-01 PROCEDURE — G8417 CALC BMI ABV UP PARAM F/U: HCPCS | Performed by: INTERNAL MEDICINE

## 2020-01-01 PROCEDURE — G8484 FLU IMMUNIZE NO ADMIN: HCPCS | Performed by: PHYSICIAN ASSISTANT

## 2020-01-01 PROCEDURE — 99212 OFFICE O/P EST SF 10 MIN: CPT | Performed by: PHYSICIAN ASSISTANT

## 2020-01-01 PROCEDURE — 7100000010 HC PHASE II RECOVERY - FIRST 15 MIN: Performed by: SURGERY

## 2020-01-01 PROCEDURE — 96409 CHEMO IV PUSH SNGL DRUG: CPT

## 2020-01-01 PROCEDURE — 93010 ELECTROCARDIOGRAM REPORT: CPT | Performed by: INTERNAL MEDICINE

## 2020-01-01 PROCEDURE — 85027 COMPLETE CBC AUTOMATED: CPT

## 2020-01-01 PROCEDURE — C1788 PORT, INDWELLING, IMP: HCPCS | Performed by: SURGERY

## 2020-01-01 PROCEDURE — 6370000000 HC RX 637 (ALT 250 FOR IP): Performed by: ANESTHESIOLOGY

## 2020-01-01 PROCEDURE — 99212 OFFICE O/P EST SF 10 MIN: CPT

## 2020-01-01 PROCEDURE — 86901 BLOOD TYPING SEROLOGIC RH(D): CPT

## 2020-01-01 PROCEDURE — 7100000001 HC PACU RECOVERY - ADDTL 15 MIN: Performed by: SURGERY

## 2020-01-01 PROCEDURE — C1713 ANCHOR/SCREW BN/BN,TIS/BN: HCPCS | Performed by: SURGERY

## 2020-01-01 PROCEDURE — C1894 INTRO/SHEATH, NON-LASER: HCPCS | Performed by: SURGERY

## 2020-01-01 PROCEDURE — 99214 OFFICE O/P EST MOD 30 MIN: CPT | Performed by: PHYSICIAN ASSISTANT

## 2020-01-01 PROCEDURE — G0439 PPPS, SUBSEQ VISIT: HCPCS | Performed by: INTERNAL MEDICINE

## 2020-01-01 PROCEDURE — 83880 ASSAY OF NATRIURETIC PEPTIDE: CPT

## 2020-01-01 PROCEDURE — 3430000000 HC RX DIAGNOSTIC RADIOPHARMACEUTICAL: Performed by: INTERNAL MEDICINE

## 2020-01-01 PROCEDURE — 6360000002 HC RX W HCPCS: Performed by: ANESTHESIOLOGY

## 2020-01-01 PROCEDURE — 6370000000 HC RX 637 (ALT 250 FOR IP): Performed by: SURGERY

## 2020-01-01 PROCEDURE — 99024 POSTOP FOLLOW-UP VISIT: CPT | Performed by: PHYSICIAN ASSISTANT

## 2020-01-01 DEVICE — PORT INFUS PLAS SGL LUMN W/ 9.6FR SIL CATH AIRGUARD VLV: Type: IMPLANTABLE DEVICE | Site: CHEST | Status: FUNCTIONAL

## 2020-01-01 RX ORDER — RITUXIMAB 10 MG/ML
375 INJECTION, SOLUTION INTRAVENOUS ONCE
COMMUNITY

## 2020-01-01 RX ORDER — HYDROCODONE BITARTRATE AND ACETAMINOPHEN 5; 325 MG/1; MG/1
1 TABLET ORAL EVERY 4 HOURS PRN
Qty: 18 TABLET | Refills: 0 | Status: SHIPPED | OUTPATIENT
Start: 2020-01-01 | End: 2020-01-01

## 2020-01-01 RX ORDER — PREDNISONE 20 MG/1
100 TABLET ORAL DAILY
Qty: 10 TABLET | Refills: 3 | Status: SHIPPED | OUTPATIENT
Start: 2020-01-01 | End: 2020-01-01 | Stop reason: SDUPTHER

## 2020-01-01 RX ORDER — DOXORUBICIN HYDROCHLORIDE 2 MG/ML
50 INJECTION, SOLUTION INTRAVENOUS ONCE
Status: DISCONTINUED | OUTPATIENT
Start: 2020-01-01 | End: 2020-01-01

## 2020-01-01 RX ORDER — ALLOPURINOL 100 MG/1
100 TABLET ORAL DAILY
Qty: 90 TABLET | Refills: 0 | Status: SHIPPED | OUTPATIENT
Start: 2020-01-01 | End: 2020-01-01

## 2020-01-01 RX ORDER — DOXORUBICIN HYDROCHLORIDE 2 MG/ML
50 INJECTION, SOLUTION INTRAVENOUS ONCE
Status: CANCELLED | OUTPATIENT
Start: 2020-01-01

## 2020-01-01 RX ORDER — PANTOPRAZOLE SODIUM 20 MG/1
20 TABLET, DELAYED RELEASE ORAL DAILY
Qty: 30 TABLET | Refills: 0 | Status: SHIPPED | OUTPATIENT
Start: 2020-01-01 | End: 2020-01-01 | Stop reason: SDUPTHER

## 2020-01-01 RX ORDER — MEPERIDINE HYDROCHLORIDE 50 MG/ML
12.5 INJECTION INTRAMUSCULAR; INTRAVENOUS; SUBCUTANEOUS ONCE
Status: CANCELLED | OUTPATIENT
Start: 2020-01-01

## 2020-01-01 RX ORDER — EPINEPHRINE 1 MG/ML
0.3 INJECTION, SOLUTION, CONCENTRATE INTRAVENOUS PRN
Status: CANCELLED | OUTPATIENT
Start: 2020-01-01

## 2020-01-01 RX ORDER — SODIUM CHLORIDE 9 MG/ML
20 INJECTION, SOLUTION INTRAVENOUS ONCE
Status: CANCELLED | OUTPATIENT
Start: 2020-01-01

## 2020-01-01 RX ORDER — SODIUM CHLORIDE 9 MG/ML
INJECTION, SOLUTION INTRAVENOUS CONTINUOUS
Status: CANCELLED | OUTPATIENT
Start: 2020-01-01

## 2020-01-01 RX ORDER — HEPARIN SODIUM (PORCINE) LOCK FLUSH IV SOLN 100 UNIT/ML 100 UNIT/ML
500 SOLUTION INTRAVENOUS PRN
Status: DISCONTINUED | OUTPATIENT
Start: 2020-01-01 | End: 2020-01-01 | Stop reason: HOSPADM

## 2020-01-01 RX ORDER — HYDROCORTISONE ACETATE 0.5 %
CREAM (GRAM) TOPICAL 2 TIMES DAILY
COMMUNITY
End: 2020-01-01

## 2020-01-01 RX ORDER — ACETAMINOPHEN 325 MG/1
1000 TABLET ORAL ONCE
Status: CANCELLED | OUTPATIENT
Start: 2020-01-01

## 2020-01-01 RX ORDER — HEPARIN SODIUM (PORCINE) LOCK FLUSH IV SOLN 100 UNIT/ML 100 UNIT/ML
500 SOLUTION INTRAVENOUS PRN
Status: CANCELLED | OUTPATIENT
Start: 2020-01-01

## 2020-01-01 RX ORDER — LIDOCAINE HYDROCHLORIDE 10 MG/ML
1 INJECTION, SOLUTION EPIDURAL; INFILTRATION; INTRACAUDAL; PERINEURAL
Status: DISCONTINUED | OUTPATIENT
Start: 2020-01-01 | End: 2020-01-01 | Stop reason: HOSPADM

## 2020-01-01 RX ORDER — CEFAZOLIN SODIUM 1 G/3ML
INJECTION, POWDER, FOR SOLUTION INTRAMUSCULAR; INTRAVENOUS PRN
Status: DISCONTINUED | OUTPATIENT
Start: 2020-01-01 | End: 2020-01-01 | Stop reason: SDUPTHER

## 2020-01-01 RX ORDER — ACETAMINOPHEN 650 MG/1
650 SUPPOSITORY RECTAL EVERY 6 HOURS PRN
Status: DISCONTINUED | OUTPATIENT
Start: 2020-01-01 | End: 2020-01-01 | Stop reason: HOSPADM

## 2020-01-01 RX ORDER — ACYCLOVIR 200 MG/1
400 CAPSULE ORAL 2 TIMES DAILY
Status: DISCONTINUED | OUTPATIENT
Start: 2020-01-01 | End: 2020-01-01 | Stop reason: HOSPADM

## 2020-01-01 RX ORDER — DIPHENHYDRAMINE HYDROCHLORIDE 50 MG/ML
25 INJECTION INTRAMUSCULAR; INTRAVENOUS ONCE
Status: COMPLETED | OUTPATIENT
Start: 2020-01-01 | End: 2020-01-01

## 2020-01-01 RX ORDER — SODIUM CHLORIDE 9 MG/ML
1000 INJECTION, SOLUTION INTRAVENOUS CONTINUOUS
Status: DISCONTINUED | OUTPATIENT
Start: 2020-01-01 | End: 2020-01-01 | Stop reason: HOSPADM

## 2020-01-01 RX ORDER — SODIUM CHLORIDE 9 MG/ML
20 INJECTION, SOLUTION INTRAVENOUS ONCE
Status: COMPLETED | OUTPATIENT
Start: 2020-01-01 | End: 2020-01-01

## 2020-01-01 RX ORDER — DIPHENHYDRAMINE HYDROCHLORIDE 50 MG/ML
50 INJECTION INTRAMUSCULAR; INTRAVENOUS ONCE
Status: CANCELLED | OUTPATIENT
Start: 2020-01-01

## 2020-01-01 RX ORDER — PALONOSETRON 0.05 MG/ML
0.25 INJECTION, SOLUTION INTRAVENOUS ONCE
Status: COMPLETED | OUTPATIENT
Start: 2020-01-01 | End: 2020-01-01

## 2020-01-01 RX ORDER — FENTANYL CITRATE 50 UG/ML
INJECTION, SOLUTION INTRAMUSCULAR; INTRAVENOUS PRN
Status: DISCONTINUED | OUTPATIENT
Start: 2020-01-01 | End: 2020-01-01 | Stop reason: SDUPTHER

## 2020-01-01 RX ORDER — PALONOSETRON 0.05 MG/ML
0.25 INJECTION, SOLUTION INTRAVENOUS ONCE
Status: CANCELLED | OUTPATIENT
Start: 2020-01-01

## 2020-01-01 RX ORDER — SODIUM CHLORIDE 0.9 % (FLUSH) 0.9 %
10 SYRINGE (ML) INJECTION PRN
Status: CANCELLED | OUTPATIENT
Start: 2020-01-01

## 2020-01-01 RX ORDER — POTASSIUM CHLORIDE 20 MEQ/1
20 TABLET, EXTENDED RELEASE ORAL ONCE
Status: COMPLETED | OUTPATIENT
Start: 2020-01-01 | End: 2020-01-01

## 2020-01-01 RX ORDER — SCOLOPAMINE TRANSDERMAL SYSTEM 1 MG/1
1 PATCH, EXTENDED RELEASE TRANSDERMAL ONCE
Status: DISCONTINUED | OUTPATIENT
Start: 2020-01-01 | End: 2020-01-01 | Stop reason: HOSPADM

## 2020-01-01 RX ORDER — LEVOFLOXACIN 250 MG/1
500 TABLET ORAL DAILY
Qty: 14 TABLET | Refills: 0 | Status: SHIPPED | OUTPATIENT
Start: 2020-01-01 | End: 2020-01-01

## 2020-01-01 RX ORDER — ALLOPURINOL 100 MG/1
100 TABLET ORAL DAILY
COMMUNITY
End: 2020-01-01 | Stop reason: SDUPTHER

## 2020-01-01 RX ORDER — SODIUM CHLORIDE 0.9 % (FLUSH) 0.9 %
10 SYRINGE (ML) INJECTION PRN
Status: DISCONTINUED | OUTPATIENT
Start: 2020-01-01 | End: 2020-01-01 | Stop reason: HOSPADM

## 2020-01-01 RX ORDER — SODIUM CHLORIDE 0.9 % (FLUSH) 0.9 %
5 SYRINGE (ML) INJECTION PRN
Status: CANCELLED | OUTPATIENT
Start: 2020-01-01

## 2020-01-01 RX ORDER — DEXAMETHASONE SODIUM PHOSPHATE 10 MG/ML
10 INJECTION, SOLUTION INTRAMUSCULAR; INTRAVENOUS ONCE
Status: COMPLETED | OUTPATIENT
Start: 2020-01-01 | End: 2020-01-01

## 2020-01-01 RX ORDER — HYDRALAZINE HYDROCHLORIDE 20 MG/ML
10 INJECTION INTRAMUSCULAR; INTRAVENOUS EVERY 4 HOURS PRN
Status: DISCONTINUED | OUTPATIENT
Start: 2020-01-01 | End: 2020-01-01 | Stop reason: HOSPADM

## 2020-01-01 RX ORDER — ACETAMINOPHEN 325 MG/1
650 TABLET ORAL EVERY 6 HOURS PRN
Status: DISCONTINUED | OUTPATIENT
Start: 2020-01-01 | End: 2020-01-01 | Stop reason: HOSPADM

## 2020-01-01 RX ORDER — SODIUM CHLORIDE 0.9 % (FLUSH) 0.9 %
20 SYRINGE (ML) INJECTION PRN
Status: DISCONTINUED | OUTPATIENT
Start: 2020-01-01 | End: 2020-01-01 | Stop reason: HOSPADM

## 2020-01-01 RX ORDER — HYDRALAZINE HYDROCHLORIDE 20 MG/ML
5 INJECTION INTRAMUSCULAR; INTRAVENOUS EVERY 10 MIN PRN
Status: DISCONTINUED | OUTPATIENT
Start: 2020-01-01 | End: 2020-01-01 | Stop reason: HOSPADM

## 2020-01-01 RX ORDER — MORPHINE SULFATE 4 MG/ML
4 INJECTION, SOLUTION INTRAMUSCULAR; INTRAVENOUS EVERY 5 MIN PRN
Status: DISCONTINUED | OUTPATIENT
Start: 2020-01-01 | End: 2020-01-01 | Stop reason: HOSPADM

## 2020-01-01 RX ORDER — MIDAZOLAM HYDROCHLORIDE 1 MG/ML
INJECTION INTRAMUSCULAR; INTRAVENOUS PRN
Status: DISCONTINUED | OUTPATIENT
Start: 2020-01-01 | End: 2020-01-01 | Stop reason: SDUPTHER

## 2020-01-01 RX ORDER — ACETAMINOPHEN 500 MG
1000 TABLET ORAL ONCE
Status: COMPLETED | OUTPATIENT
Start: 2020-01-01 | End: 2020-01-01

## 2020-01-01 RX ORDER — ACETAMINOPHEN 325 MG/1
1000 TABLET ORAL ONCE
Status: CANCELLED
Start: 2020-01-01

## 2020-01-01 RX ORDER — ONDANSETRON 2 MG/ML
INJECTION INTRAMUSCULAR; INTRAVENOUS PRN
Status: DISCONTINUED | OUTPATIENT
Start: 2020-01-01 | End: 2020-01-01 | Stop reason: SDUPTHER

## 2020-01-01 RX ORDER — SODIUM CHLORIDE 0.9 % (FLUSH) 0.9 %
20 SYRINGE (ML) INJECTION PRN
Status: CANCELLED | OUTPATIENT
Start: 2020-01-01

## 2020-01-01 RX ORDER — SODIUM CHLORIDE 0.9 % (FLUSH) 0.9 %
5 SYRINGE (ML) INJECTION PRN
Status: DISCONTINUED | OUTPATIENT
Start: 2020-01-01 | End: 2020-01-01 | Stop reason: HOSPADM

## 2020-01-01 RX ORDER — METOCLOPRAMIDE HYDROCHLORIDE 5 MG/ML
10 INJECTION INTRAMUSCULAR; INTRAVENOUS
Status: DISCONTINUED | OUTPATIENT
Start: 2020-01-01 | End: 2020-01-01 | Stop reason: HOSPADM

## 2020-01-01 RX ORDER — MEGESTROL ACETATE 40 MG/ML
400 SUSPENSION ORAL DAILY
Qty: 600 ML | Refills: 1 | Status: SHIPPED | OUTPATIENT
Start: 2020-01-01 | End: 2020-01-01 | Stop reason: SDUPTHER

## 2020-01-01 RX ORDER — LABETALOL HYDROCHLORIDE 5 MG/ML
5 INJECTION, SOLUTION INTRAVENOUS EVERY 10 MIN PRN
Status: DISCONTINUED | OUTPATIENT
Start: 2020-01-01 | End: 2020-01-01 | Stop reason: HOSPADM

## 2020-01-01 RX ORDER — DIPHENHYDRAMINE HYDROCHLORIDE 50 MG/ML
12.5 INJECTION INTRAMUSCULAR; INTRAVENOUS
Status: DISCONTINUED | OUTPATIENT
Start: 2020-01-01 | End: 2020-01-01 | Stop reason: HOSPADM

## 2020-01-01 RX ORDER — DIPHENHYDRAMINE HYDROCHLORIDE 50 MG/ML
25 INJECTION INTRAMUSCULAR; INTRAVENOUS ONCE
Status: CANCELLED | OUTPATIENT
Start: 2020-01-01

## 2020-01-01 RX ORDER — MEGESTROL ACETATE 40 MG/ML
200 SUSPENSION ORAL DAILY
Qty: 240 ML | Refills: 3 | Status: SHIPPED | OUTPATIENT
Start: 2020-01-01 | End: 2020-01-01 | Stop reason: CLARIF

## 2020-01-01 RX ORDER — FAMOTIDINE 20 MG/1
20 TABLET, FILM COATED ORAL ONCE
Status: COMPLETED | OUTPATIENT
Start: 2020-01-01 | End: 2020-01-01

## 2020-01-01 RX ORDER — FENTANYL CITRATE 50 UG/ML
50 INJECTION, SOLUTION INTRAMUSCULAR; INTRAVENOUS
Status: DISCONTINUED | OUTPATIENT
Start: 2020-01-01 | End: 2020-01-01 | Stop reason: HOSPADM

## 2020-01-01 RX ORDER — METHYLPREDNISOLONE SODIUM SUCCINATE 125 MG/2ML
125 INJECTION, POWDER, LYOPHILIZED, FOR SOLUTION INTRAMUSCULAR; INTRAVENOUS ONCE
Status: CANCELLED | OUTPATIENT
Start: 2020-01-01

## 2020-01-01 RX ORDER — GABAPENTIN 300 MG/1
300 CAPSULE ORAL ONCE
Status: COMPLETED | OUTPATIENT
Start: 2020-01-01 | End: 2020-01-01

## 2020-01-01 RX ORDER — MORPHINE SULFATE 4 MG/ML
2 INJECTION, SOLUTION INTRAMUSCULAR; INTRAVENOUS EVERY 5 MIN PRN
Status: DISCONTINUED | OUTPATIENT
Start: 2020-01-01 | End: 2020-01-01 | Stop reason: HOSPADM

## 2020-01-01 RX ORDER — 0.9 % SODIUM CHLORIDE 0.9 %
10 VIAL (ML) INJECTION ONCE
Status: CANCELLED | OUTPATIENT
Start: 2020-01-01

## 2020-01-01 RX ORDER — FENTANYL CITRATE 50 UG/ML
50 INJECTION, SOLUTION INTRAMUSCULAR; INTRAVENOUS EVERY 5 MIN PRN
Status: DISCONTINUED | OUTPATIENT
Start: 2020-01-01 | End: 2020-01-01 | Stop reason: HOSPADM

## 2020-01-01 RX ORDER — PROPOFOL 10 MG/ML
INJECTION, EMULSION INTRAVENOUS CONTINUOUS PRN
Status: DISCONTINUED | OUTPATIENT
Start: 2020-01-01 | End: 2020-01-01 | Stop reason: SDUPTHER

## 2020-01-01 RX ORDER — DEXAMETHASONE SODIUM PHOSPHATE 10 MG/ML
INJECTION, SOLUTION INTRAMUSCULAR; INTRAVENOUS PRN
Status: DISCONTINUED | OUTPATIENT
Start: 2020-01-01 | End: 2020-01-01 | Stop reason: SDUPTHER

## 2020-01-01 RX ORDER — HEPARIN SODIUM (PORCINE) LOCK FLUSH IV SOLN 100 UNIT/ML 100 UNIT/ML
300 SOLUTION INTRAVENOUS PRN
Status: DISCONTINUED | OUTPATIENT
Start: 2020-01-01 | End: 2020-01-01 | Stop reason: HOSPADM

## 2020-01-01 RX ORDER — SULFAMETHOXAZOLE AND TRIMETHOPRIM 800; 160 MG/1; MG/1
1 TABLET ORAL EVERY 12 HOURS
Qty: 20 TABLET | Refills: 0 | Status: SHIPPED | OUTPATIENT
Start: 2020-01-01 | End: 2020-01-01

## 2020-01-01 RX ORDER — PROMETHAZINE HYDROCHLORIDE 25 MG/ML
6.25 INJECTION, SOLUTION INTRAMUSCULAR; INTRAVENOUS
Status: DISCONTINUED | OUTPATIENT
Start: 2020-01-01 | End: 2020-01-01 | Stop reason: HOSPADM

## 2020-01-01 RX ORDER — AZITHROMYCIN 250 MG/1
TABLET, FILM COATED ORAL
Qty: 1 PACKET | Refills: 0 | Status: SHIPPED | OUTPATIENT
Start: 2020-01-01 | End: 2020-01-01

## 2020-01-01 RX ORDER — ALLOPURINOL 300 MG/1
300 TABLET ORAL DAILY
Qty: 90 TABLET | Refills: 1 | Status: SHIPPED | OUTPATIENT
Start: 2020-01-01

## 2020-01-01 RX ORDER — ACETAMINOPHEN 325 MG/1
975 TABLET ORAL ONCE
Status: CANCELLED | OUTPATIENT
Start: 2020-01-01

## 2020-01-01 RX ORDER — ACETAMINOPHEN 325 MG/1
975 TABLET ORAL ONCE
Status: COMPLETED | OUTPATIENT
Start: 2020-01-01 | End: 2020-01-01

## 2020-01-01 RX ORDER — SODIUM CHLORIDE, SODIUM LACTATE, POTASSIUM CHLORIDE, CALCIUM CHLORIDE 600; 310; 30; 20 MG/100ML; MG/100ML; MG/100ML; MG/100ML
INJECTION, SOLUTION INTRAVENOUS CONTINUOUS
Status: DISCONTINUED | OUTPATIENT
Start: 2020-01-01 | End: 2020-01-01 | Stop reason: HOSPADM

## 2020-01-01 RX ORDER — HYDROMORPHONE HYDROCHLORIDE 1 MG/ML
0.5 INJECTION, SOLUTION INTRAMUSCULAR; INTRAVENOUS; SUBCUTANEOUS EVERY 5 MIN PRN
Status: DISCONTINUED | OUTPATIENT
Start: 2020-01-01 | End: 2020-01-01 | Stop reason: HOSPADM

## 2020-01-01 RX ORDER — POTASSIUM CHLORIDE 20 MEQ/1
20 TABLET, EXTENDED RELEASE ORAL DAILY
Qty: 14 TABLET | Refills: 0 | Status: SHIPPED | OUTPATIENT
Start: 2020-01-01 | End: 2020-01-01

## 2020-01-01 RX ORDER — PANTOPRAZOLE SODIUM 20 MG/1
20 TABLET, DELAYED RELEASE ORAL 2 TIMES DAILY
Qty: 60 TABLET | Refills: 2 | Status: SHIPPED | OUTPATIENT
Start: 2020-01-01 | End: 2020-01-01

## 2020-01-01 RX ORDER — CALCITONIN SALMON 200 [IU]/.09ML
1 SPRAY, METERED NASAL DAILY
Qty: 1 BOTTLE | Refills: 5 | Status: SHIPPED | OUTPATIENT
Start: 2020-01-01

## 2020-01-01 RX ORDER — SODIUM CHLORIDE 9 MG/ML
INJECTION, SOLUTION INTRAVENOUS CONTINUOUS
Status: DISCONTINUED | OUTPATIENT
Start: 2020-01-01 | End: 2020-01-01 | Stop reason: HOSPADM

## 2020-01-01 RX ORDER — MAGNESIUM SULFATE 1 G/100ML
1 INJECTION INTRAVENOUS PRN
Status: DISCONTINUED | OUTPATIENT
Start: 2020-01-01 | End: 2020-01-01 | Stop reason: HOSPADM

## 2020-01-01 RX ORDER — ACYCLOVIR 200 MG/1
400 CAPSULE ORAL 2 TIMES DAILY
COMMUNITY
End: 2020-01-01

## 2020-01-01 RX ORDER — HEPARIN SODIUM (PORCINE) LOCK FLUSH IV SOLN 100 UNIT/ML 100 UNIT/ML
300 SOLUTION INTRAVENOUS PRN
Status: CANCELLED | OUTPATIENT
Start: 2020-01-01

## 2020-01-01 RX ORDER — DOXORUBICIN HYDROCHLORIDE 2 MG/ML
30 INJECTION, SOLUTION INTRAVENOUS ONCE
Status: CANCELLED | OUTPATIENT
Start: 2020-01-01

## 2020-01-01 RX ORDER — POTASSIUM CHLORIDE 7.45 MG/ML
10 INJECTION INTRAVENOUS ONCE
Status: COMPLETED | OUTPATIENT
Start: 2020-01-01 | End: 2020-01-01

## 2020-01-01 RX ORDER — POTASSIUM CHLORIDE 7.45 MG/ML
10 INJECTION INTRAVENOUS PRN
Status: DISCONTINUED | OUTPATIENT
Start: 2020-01-01 | End: 2020-01-01 | Stop reason: HOSPADM

## 2020-01-01 RX ORDER — HEPARIN SODIUM (PORCINE) LOCK FLUSH IV SOLN 100 UNIT/ML 100 UNIT/ML
500 SOLUTION INTRAVENOUS PRN
Status: CANCELLED
Start: 2020-01-01

## 2020-01-01 RX ORDER — HYDROMORPHONE HYDROCHLORIDE 1 MG/ML
0.25 INJECTION, SOLUTION INTRAMUSCULAR; INTRAVENOUS; SUBCUTANEOUS EVERY 5 MIN PRN
Status: DISCONTINUED | OUTPATIENT
Start: 2020-01-01 | End: 2020-01-01 | Stop reason: HOSPADM

## 2020-01-01 RX ORDER — DOXORUBICIN HYDROCHLORIDE 2 MG/ML
50 INJECTION, SOLUTION INTRAVENOUS ONCE
Status: DISCONTINUED | OUTPATIENT
Start: 2020-01-01 | End: 2020-01-01 | Stop reason: SDUPTHER

## 2020-01-01 RX ORDER — LIDOCAINE HYDROCHLORIDE 10 MG/ML
INJECTION, SOLUTION EPIDURAL; INFILTRATION; INTRACAUDAL; PERINEURAL PRN
Status: DISCONTINUED | OUTPATIENT
Start: 2020-01-01 | End: 2020-01-01 | Stop reason: SDUPTHER

## 2020-01-01 RX ORDER — SODIUM CHLORIDE 0.9 % (FLUSH) 0.9 %
10 SYRINGE (ML) INJECTION EVERY 12 HOURS SCHEDULED
Status: DISCONTINUED | OUTPATIENT
Start: 2020-01-01 | End: 2020-01-01 | Stop reason: HOSPADM

## 2020-01-01 RX ORDER — CELECOXIB 200 MG/1
200 CAPSULE ORAL ONCE
Status: COMPLETED | OUTPATIENT
Start: 2020-01-01 | End: 2020-01-01

## 2020-01-01 RX ORDER — POLYETHYLENE GLYCOL 3350 17 G/17G
17 POWDER, FOR SOLUTION ORAL DAILY PRN
Status: DISCONTINUED | OUTPATIENT
Start: 2020-01-01 | End: 2020-01-01 | Stop reason: HOSPADM

## 2020-01-01 RX ORDER — LISINOPRIL 20 MG/1
20 TABLET ORAL DAILY
Status: DISCONTINUED | OUTPATIENT
Start: 2020-01-01 | End: 2020-01-01 | Stop reason: HOSPADM

## 2020-01-01 RX ORDER — SODIUM CHLORIDE 9 MG/ML
10 INJECTION INTRAVENOUS DAILY
Status: DISCONTINUED | OUTPATIENT
Start: 2020-01-01 | End: 2020-01-01 | Stop reason: HOSPADM

## 2020-01-01 RX ORDER — ENALAPRILAT 2.5 MG/2ML
1.25 INJECTION INTRAVENOUS
Status: DISCONTINUED | OUTPATIENT
Start: 2020-01-01 | End: 2020-01-01 | Stop reason: HOSPADM

## 2020-01-01 RX ORDER — DIPHENHYDRAMINE HYDROCHLORIDE 50 MG/ML
50 INJECTION INTRAMUSCULAR; INTRAVENOUS ONCE
Status: COMPLETED | OUTPATIENT
Start: 2020-01-01 | End: 2020-01-01

## 2020-01-01 RX ORDER — CALCITONIN SALMON 200 [IU]/.09ML
1 SPRAY, METERED NASAL DAILY
COMMUNITY
End: 2020-01-01 | Stop reason: SDUPTHER

## 2020-01-01 RX ORDER — LISINOPRIL AND HYDROCHLOROTHIAZIDE 20; 12.5 MG/1; MG/1
1 TABLET ORAL DAILY
Qty: 90 TABLET | Refills: 3 | Status: SHIPPED | OUTPATIENT
Start: 2020-01-01 | End: 2020-01-01

## 2020-01-01 RX ORDER — POTASSIUM CHLORIDE 20 MEQ/1
20 TABLET, EXTENDED RELEASE ORAL 2 TIMES DAILY
Qty: 180 TABLET | Refills: 1 | Status: SHIPPED | OUTPATIENT
Start: 2020-01-01 | End: 2020-01-01 | Stop reason: CLARIF

## 2020-01-01 RX ORDER — POTASSIUM CHLORIDE 20 MEQ/1
40 TABLET, EXTENDED RELEASE ORAL PRN
Status: DISCONTINUED | OUTPATIENT
Start: 2020-01-01 | End: 2020-01-01 | Stop reason: HOSPADM

## 2020-01-01 RX ORDER — MEGESTROL ACETATE 40 MG/ML
400 SUSPENSION ORAL DAILY
Qty: 600 ML | Refills: 1 | Status: SHIPPED | OUTPATIENT
Start: 2020-01-01 | End: 2020-01-01

## 2020-01-01 RX ORDER — DEXAMETHASONE SODIUM PHOSPHATE 4 MG/ML
4 INJECTION, SOLUTION INTRA-ARTICULAR; INTRALESIONAL; INTRAMUSCULAR; INTRAVENOUS; SOFT TISSUE ONCE
Status: COMPLETED | OUTPATIENT
Start: 2020-01-01 | End: 2020-01-01

## 2020-01-01 RX ORDER — CELECOXIB 200 MG/1
200 CAPSULE ORAL ONCE
Status: CANCELLED | OUTPATIENT
Start: 2020-01-01

## 2020-01-01 RX ORDER — PREDNISONE 20 MG/1
100 TABLET ORAL DAILY
Qty: 25 TABLET | Refills: 3 | Status: SHIPPED | OUTPATIENT
Start: 2020-01-01 | End: 2020-01-01

## 2020-01-01 RX ORDER — LISINOPRIL AND HYDROCHLOROTHIAZIDE 20; 12.5 MG/1; MG/1
TABLET ORAL
Qty: 90 TABLET | Refills: 0 | Status: SHIPPED | OUTPATIENT
Start: 2020-01-01

## 2020-01-01 RX ORDER — ONDANSETRON HYDROCHLORIDE 8 MG/1
8 TABLET, FILM COATED ORAL EVERY 8 HOURS PRN
Qty: 90 TABLET | Refills: 2 | Status: SHIPPED | OUTPATIENT
Start: 2020-01-01 | End: 2020-01-01

## 2020-01-01 RX ORDER — PANTOPRAZOLE SODIUM 40 MG/10ML
40 INJECTION, POWDER, LYOPHILIZED, FOR SOLUTION INTRAVENOUS DAILY
Status: DISCONTINUED | OUTPATIENT
Start: 2020-01-01 | End: 2020-01-01 | Stop reason: HOSPADM

## 2020-01-01 RX ORDER — MORPHINE SULFATE 4 MG/ML
4 INJECTION, SOLUTION INTRAMUSCULAR; INTRAVENOUS
Status: DISCONTINUED | OUTPATIENT
Start: 2020-01-01 | End: 2020-01-01 | Stop reason: HOSPADM

## 2020-01-01 RX ORDER — MIDAZOLAM HYDROCHLORIDE 1 MG/ML
2 INJECTION INTRAMUSCULAR; INTRAVENOUS
Status: DISCONTINUED | OUTPATIENT
Start: 2020-01-01 | End: 2020-01-01 | Stop reason: HOSPADM

## 2020-01-01 RX ORDER — LABETALOL 20 MG/4 ML (5 MG/ML) INTRAVENOUS SYRINGE
5 EVERY 10 MIN PRN
Status: DISCONTINUED | OUTPATIENT
Start: 2020-01-01 | End: 2020-01-01 | Stop reason: HOSPADM

## 2020-01-01 RX ORDER — DOXORUBICIN HYDROCHLORIDE 2 MG/ML
30 INJECTION, SOLUTION INTRAVENOUS ONCE
Status: DISCONTINUED | OUTPATIENT
Start: 2020-01-01 | End: 2020-01-01 | Stop reason: SDUPTHER

## 2020-01-01 RX ORDER — MEPERIDINE HYDROCHLORIDE 50 MG/ML
12.5 INJECTION INTRAMUSCULAR; INTRAVENOUS; SUBCUTANEOUS EVERY 5 MIN PRN
Status: DISCONTINUED | OUTPATIENT
Start: 2020-01-01 | End: 2020-01-01 | Stop reason: HOSPADM

## 2020-01-01 RX ORDER — PROPOFOL 10 MG/ML
INJECTION, EMULSION INTRAVENOUS PRN
Status: DISCONTINUED | OUTPATIENT
Start: 2020-01-01 | End: 2020-01-01 | Stop reason: SDUPTHER

## 2020-01-01 RX ORDER — MAGNESIUM SULFATE 1 G/100ML
1 INJECTION INTRAVENOUS ONCE
Status: DISCONTINUED | OUTPATIENT
Start: 2020-01-01 | End: 2020-01-01

## 2020-01-01 RX ORDER — ONDANSETRON 2 MG/ML
4 INJECTION INTRAMUSCULAR; INTRAVENOUS
Status: DISCONTINUED | OUTPATIENT
Start: 2020-01-01 | End: 2020-01-01 | Stop reason: HOSPADM

## 2020-01-01 RX ORDER — ALLOPURINOL 100 MG/1
100 TABLET ORAL DAILY
Status: DISCONTINUED | OUTPATIENT
Start: 2020-01-01 | End: 2020-01-01 | Stop reason: HOSPADM

## 2020-01-01 RX ORDER — ONDANSETRON 2 MG/ML
4 INJECTION INTRAMUSCULAR; INTRAVENOUS EVERY 6 HOURS PRN
Status: DISCONTINUED | OUTPATIENT
Start: 2020-01-01 | End: 2020-01-01 | Stop reason: HOSPADM

## 2020-01-01 RX ORDER — GABAPENTIN 300 MG/1
300 CAPSULE ORAL ONCE
Status: CANCELLED | OUTPATIENT
Start: 2020-01-01

## 2020-01-01 RX ORDER — ALLOPURINOL 300 MG/1
300 TABLET ORAL DAILY
Qty: 30 TABLET | Refills: 3 | Status: SHIPPED | OUTPATIENT
Start: 2020-01-01 | End: 2020-01-01 | Stop reason: CLARIF

## 2020-01-01 RX ORDER — FLUDEOXYGLUCOSE F 18 200 MCI/ML
10 INJECTION, SOLUTION INTRAVENOUS
Status: COMPLETED | OUTPATIENT
Start: 2020-01-01 | End: 2020-01-01

## 2020-01-01 RX ORDER — LISINOPRIL 20 MG/1
20 TABLET ORAL DAILY
COMMUNITY
End: 2020-01-01

## 2020-01-01 RX ORDER — HYDROCODONE BITARTRATE AND ACETAMINOPHEN 5; 325 MG/1; MG/1
1 TABLET ORAL EVERY 6 HOURS PRN
Qty: 120 TABLET | Refills: 0 | Status: SHIPPED | OUTPATIENT
Start: 2020-01-01 | End: 2020-01-01

## 2020-01-01 RX ADMIN — HEPARIN 500 UNITS: 100 SYRINGE at 12:18

## 2020-01-01 RX ADMIN — ONDANSETRON HYDROCHLORIDE 4 MG: 2 INJECTION, SOLUTION INTRAMUSCULAR; INTRAVENOUS at 08:24

## 2020-01-01 RX ADMIN — HEPARIN 500 UNITS: 100 SYRINGE at 16:11

## 2020-01-01 RX ADMIN — MAGNESIUM SULFATE HEPTAHYDRATE 1 G: 1 INJECTION, SOLUTION INTRAVENOUS at 22:15

## 2020-01-01 RX ADMIN — DIPHENHYDRAMINE HYDROCHLORIDE 50 MG: 50 INJECTION, SOLUTION INTRAMUSCULAR; INTRAVENOUS at 11:30

## 2020-01-01 RX ADMIN — DEXAMETHASONE SODIUM PHOSPHATE 10 MG: 10 INJECTION, SOLUTION INTRAMUSCULAR; INTRAVENOUS at 14:01

## 2020-01-01 RX ADMIN — SODIUM CHLORIDE 150 MG: 900 INJECTION, SOLUTION INTRAVENOUS at 13:57

## 2020-01-01 RX ADMIN — HEPARIN 300 UNITS: 100 SYRINGE at 16:31

## 2020-01-01 RX ADMIN — HEPARIN 500 UNITS: 100 SYRINGE at 15:42

## 2020-01-01 RX ADMIN — PALONOSETRON HYDROCHLORIDE 0.25 MG: 0.25 INJECTION, SOLUTION INTRAVENOUS at 12:42

## 2020-01-01 RX ADMIN — LIDOCAINE HYDROCHLORIDE 50 MG: 10 INJECTION, SOLUTION EPIDURAL; INFILTRATION; INTRACAUDAL; PERINEURAL at 08:36

## 2020-01-01 RX ADMIN — SODIUM CHLORIDE, PRESERVATIVE FREE 10 ML: 5 INJECTION INTRAVENOUS at 15:38

## 2020-01-01 RX ADMIN — SODIUM CHLORIDE, POTASSIUM CHLORIDE, SODIUM LACTATE AND CALCIUM CHLORIDE: 600; 310; 30; 20 INJECTION, SOLUTION INTRAVENOUS at 07:07

## 2020-01-01 RX ADMIN — ZOLEDRONIC ACID 4 MG: 4 INJECTION, SOLUTION, CONCENTRATE INTRAVENOUS at 14:23

## 2020-01-01 RX ADMIN — POLATUZUMAB VEDOTIN 100 MG: 140 INJECTION, POWDER, LYOPHILIZED, FOR SOLUTION INTRAVENOUS at 14:24

## 2020-01-01 RX ADMIN — SODIUM CHLORIDE: 9 INJECTION, SOLUTION INTRAVENOUS at 06:51

## 2020-01-01 RX ADMIN — SODIUM CHLORIDE 150 MG: 900 INJECTION, SOLUTION INTRAVENOUS at 13:28

## 2020-01-01 RX ADMIN — SODIUM CHLORIDE, PRESERVATIVE FREE 10 ML: 5 INJECTION INTRAVENOUS at 17:26

## 2020-01-01 RX ADMIN — SODIUM CHLORIDE, PRESERVATIVE FREE 10 ML: 5 INJECTION INTRAVENOUS at 09:10

## 2020-01-01 RX ADMIN — SODIUM CHLORIDE 76 MG: 9 INJECTION, SOLUTION INTRAVENOUS at 16:04

## 2020-01-01 RX ADMIN — DEXAMETHASONE SODIUM PHOSPHATE 10 MG: 10 INJECTION, SOLUTION INTRAMUSCULAR; INTRAVENOUS at 13:20

## 2020-01-01 RX ADMIN — ACETAMINOPHEN 1000 MG: 500 TABLET ORAL at 13:11

## 2020-01-01 RX ADMIN — ALLOPURINOL 100 MG: 100 TABLET ORAL at 10:41

## 2020-01-01 RX ADMIN — ACETAMINOPHEN 650 MG: 325 TABLET ORAL at 04:13

## 2020-01-01 RX ADMIN — POTASSIUM CHLORIDE 40 MEQ: 1500 TABLET, EXTENDED RELEASE ORAL at 09:01

## 2020-01-01 RX ADMIN — ACETAMINOPHEN 1000 MG: 500 TABLET, FILM COATED ORAL at 14:08

## 2020-01-01 RX ADMIN — PEGFILGRASTIM 6 MG: KIT SUBCUTANEOUS at 14:18

## 2020-01-01 RX ADMIN — DEXAMETHASONE SODIUM PHOSPHATE: 10 INJECTION, SOLUTION INTRAMUSCULAR; INTRAVENOUS at 13:27

## 2020-01-01 RX ADMIN — CEFAZOLIN 1000 MG: 330 INJECTION, POWDER, FOR SOLUTION INTRAMUSCULAR; INTRAVENOUS at 08:25

## 2020-01-01 RX ADMIN — PEGFILGRASTIM 6 MG: KIT SUBCUTANEOUS at 18:02

## 2020-01-01 RX ADMIN — DEXAMETHASONE SODIUM PHOSPHATE 10 MG: 10 INJECTION, SOLUTION INTRAMUSCULAR; INTRAVENOUS at 14:09

## 2020-01-01 RX ADMIN — SODIUM CHLORIDE, SODIUM LACTATE, POTASSIUM CHLORIDE, AND CALCIUM CHLORIDE: 600; 310; 30; 20 INJECTION, SOLUTION INTRAVENOUS at 06:56

## 2020-01-01 RX ADMIN — IOPAMIDOL 90 ML: 755 INJECTION, SOLUTION INTRAVENOUS at 15:44

## 2020-01-01 RX ADMIN — VINCRISTINE SULFATE 2 MG: 1 INJECTION, SOLUTION INTRAVENOUS at 16:40

## 2020-01-01 RX ADMIN — BENDAMUSTINE HYDROCHLORIDE 119.2 MG: 25 INJECTION, SOLUTION INTRAVENOUS at 15:55

## 2020-01-01 RX ADMIN — SODIUM CHLORIDE 20 ML/HR: 9 INJECTION, SOLUTION INTRAVENOUS at 13:54

## 2020-01-01 RX ADMIN — SODIUM CHLORIDE 76 MG: 900 INJECTION, SOLUTION INTRAVENOUS at 14:53

## 2020-01-01 RX ADMIN — HEPARIN 500 UNITS: 100 SYRINGE at 17:13

## 2020-01-01 RX ADMIN — SODIUM CHLORIDE 3 MG: 9 INJECTION, SOLUTION INTRAVENOUS at 10:11

## 2020-01-01 RX ADMIN — MIDAZOLAM 2 MG: 1 INJECTION INTRAMUSCULAR; INTRAVENOUS at 08:19

## 2020-01-01 RX ADMIN — SODIUM CHLORIDE: 9 INJECTION, SOLUTION INTRAVENOUS at 20:22

## 2020-01-01 RX ADMIN — SODIUM CHLORIDE, PRESERVATIVE FREE 10 ML: 5 INJECTION INTRAVENOUS at 12:18

## 2020-01-01 RX ADMIN — PROPOFOL 100 MG: 10 INJECTION, EMULSION INTRAVENOUS at 08:36

## 2020-01-01 RX ADMIN — SODIUM CHLORIDE 150 MG: 900 INJECTION, SOLUTION INTRAVENOUS at 12:46

## 2020-01-01 RX ADMIN — SODIUM CHLORIDE 46 MG: 9 INJECTION, SOLUTION INTRAVENOUS at 13:29

## 2020-01-01 RX ADMIN — PALONOSETRON 0.25 MG: 0.05 INJECTION, SOLUTION INTRAVENOUS at 12:24

## 2020-01-01 RX ADMIN — ACYCLOVIR 400 MG: 200 CAPSULE ORAL at 09:02

## 2020-01-01 RX ADMIN — DEXAMETHASONE SODIUM PHOSPHATE 4 MG: 4 INJECTION, SOLUTION INTRAMUSCULAR; INTRAVENOUS at 07:07

## 2020-01-01 RX ADMIN — HEPARIN 300 UNITS: 100 SYRINGE at 15:38

## 2020-01-01 RX ADMIN — HEPARIN 500 UNITS: 100 SYRINGE at 16:43

## 2020-01-01 RX ADMIN — ACETAMINOPHEN 1000 MG: 500 TABLET ORAL at 13:19

## 2020-01-01 RX ADMIN — CEFAZOLIN SODIUM 1 G: 1 INJECTION, POWDER, FOR SOLUTION INTRAMUSCULAR; INTRAVENOUS at 08:39

## 2020-01-01 RX ADMIN — DEXAMETHASONE SODIUM PHOSPHATE 10 MG: 10 INJECTION, SOLUTION INTRAMUSCULAR; INTRAVENOUS at 12:24

## 2020-01-01 RX ADMIN — SODIUM CHLORIDE 1000 ML: 9 INJECTION, SOLUTION INTRAVENOUS at 20:27

## 2020-01-01 RX ADMIN — CYCLOPHOSPHAMIDE 1120 MG: 1 INJECTION, POWDER, FOR SOLUTION INTRAVENOUS; ORAL at 15:36

## 2020-01-01 RX ADMIN — POTASSIUM CHLORIDE 40 MEQ: 1500 TABLET, EXTENDED RELEASE ORAL at 11:36

## 2020-01-01 RX ADMIN — MAGNESIUM SULFATE HEPTAHYDRATE 1 G: 1 INJECTION, SOLUTION INTRAVENOUS at 10:24

## 2020-01-01 RX ADMIN — SODIUM CHLORIDE, PRESERVATIVE FREE 10 ML: 5 INJECTION INTRAVENOUS at 14:18

## 2020-01-01 RX ADMIN — RITUXIMAB 560 MG: 10 INJECTION, SOLUTION INTRAVENOUS at 15:09

## 2020-01-01 RX ADMIN — ACETAMINOPHEN 1000 MG: 500 TABLET, FILM COATED ORAL at 13:57

## 2020-01-01 RX ADMIN — ENOXAPARIN SODIUM 40 MG: 40 INJECTION SUBCUTANEOUS at 09:10

## 2020-01-01 RX ADMIN — PROPOFOL 50 MCG/KG/MIN: 10 INJECTION, EMULSION INTRAVENOUS at 08:24

## 2020-01-01 RX ADMIN — SODIUM CHLORIDE 20 ML/HR: 9 INJECTION, SOLUTION INTRAVENOUS at 13:12

## 2020-01-01 RX ADMIN — CYCLOPHOSPHAMIDE 1120 MG: 1 INJECTION, POWDER, FOR SOLUTION INTRAVENOUS; ORAL at 15:51

## 2020-01-01 RX ADMIN — ACYCLOVIR 400 MG: 200 CAPSULE ORAL at 19:40

## 2020-01-01 RX ADMIN — SODIUM CHLORIDE 76 MG: 9 INJECTION, SOLUTION INTRAVENOUS at 15:40

## 2020-01-01 RX ADMIN — DIPHENHYDRAMINE HYDROCHLORIDE 25 MG: 50 INJECTION, SOLUTION INTRAMUSCULAR; INTRAVENOUS at 09:46

## 2020-01-01 RX ADMIN — MAGNESIUM SULFATE HEPTAHYDRATE 1 G: 1 INJECTION, SOLUTION INTRAVENOUS at 06:42

## 2020-01-01 RX ADMIN — ALLOPURINOL 100 MG: 100 TABLET ORAL at 09:09

## 2020-01-01 RX ADMIN — SODIUM CHLORIDE, PRESERVATIVE FREE 10 ML: 5 INJECTION INTRAVENOUS at 09:02

## 2020-01-01 RX ADMIN — DEXAMETHASONE SODIUM PHOSPHATE 10 MG: 10 INJECTION, SOLUTION INTRAMUSCULAR; INTRAVENOUS at 13:10

## 2020-01-01 RX ADMIN — SODIUM CHLORIDE, PRESERVATIVE FREE 20 ML: 5 INJECTION INTRAVENOUS at 13:21

## 2020-01-01 RX ADMIN — PANTOPRAZOLE SODIUM 40 MG: 40 INJECTION, POWDER, FOR SOLUTION INTRAVENOUS at 09:02

## 2020-01-01 RX ADMIN — PEGFILGRASTIM 6 MG: KIT SUBCUTANEOUS at 15:00

## 2020-01-01 RX ADMIN — MAGNESIUM SULFATE HEPTAHYDRATE 1 G: 1 INJECTION, SOLUTION INTRAVENOUS at 00:53

## 2020-01-01 RX ADMIN — CYCLOPHOSPHAMIDE 1120 MG: 1 INJECTION, POWDER, FOR SOLUTION INTRAVENOUS; ORAL at 16:45

## 2020-01-01 RX ADMIN — DEXAMETHASONE SODIUM PHOSPHATE 10 MG: 10 INJECTION, SOLUTION INTRAMUSCULAR; INTRAVENOUS at 13:57

## 2020-01-01 RX ADMIN — PANTOPRAZOLE SODIUM 40 MG: 40 INJECTION, POWDER, FOR SOLUTION INTRAVENOUS at 09:09

## 2020-01-01 RX ADMIN — ACYCLOVIR 400 MG: 200 CAPSULE ORAL at 20:27

## 2020-01-01 RX ADMIN — PALONOSETRON HYDROCHLORIDE 0.25 MG: 0.25 INJECTION, SOLUTION INTRAVENOUS at 13:19

## 2020-01-01 RX ADMIN — DIPHENHYDRAMINE HYDROCHLORIDE 25 MG: 50 INJECTION, SOLUTION INTRAMUSCULAR; INTRAVENOUS at 13:19

## 2020-01-01 RX ADMIN — SODIUM CHLORIDE 560 MG: 9 INJECTION, SOLUTION INTRAVENOUS at 10:41

## 2020-01-01 RX ADMIN — SODIUM CHLORIDE, PRESERVATIVE FREE 10 ML: 5 INJECTION INTRAVENOUS at 16:43

## 2020-01-01 RX ADMIN — MAGNESIUM SULFATE HEPTAHYDRATE 1 G: 1 INJECTION, SOLUTION INTRAVENOUS at 09:04

## 2020-01-01 RX ADMIN — HEPARIN 500 UNITS: 100 SYRINGE at 14:18

## 2020-01-01 RX ADMIN — DIPHENHYDRAMINE HYDROCHLORIDE 50 MG: 50 INJECTION, SOLUTION INTRAMUSCULAR; INTRAVENOUS at 13:36

## 2020-01-01 RX ADMIN — OBINUTUZUMAB 1000 MG: 1000 INJECTION, SOLUTION, CONCENTRATE INTRAVENOUS at 12:30

## 2020-01-01 RX ADMIN — DEXAMETHASONE SODIUM PHOSPHATE: 10 INJECTION, SOLUTION INTRAMUSCULAR; INTRAVENOUS at 11:31

## 2020-01-01 RX ADMIN — HEPARIN 500 UNITS: 100 SYRINGE at 16:52

## 2020-01-01 RX ADMIN — IOPAMIDOL 90 ML: 755 INJECTION, SOLUTION INTRAVENOUS at 13:24

## 2020-01-01 RX ADMIN — DIPHENHYDRAMINE HYDROCHLORIDE 25 MG: 50 INJECTION, SOLUTION INTRAMUSCULAR; INTRAVENOUS at 14:09

## 2020-01-01 RX ADMIN — SODIUM CHLORIDE, PRESERVATIVE FREE 10 ML: 5 INJECTION INTRAVENOUS at 09:09

## 2020-01-01 RX ADMIN — DEXAMETHASONE SODIUM PHOSPHATE: 10 INJECTION, SOLUTION INTRAMUSCULAR; INTRAVENOUS at 13:44

## 2020-01-01 RX ADMIN — ACYCLOVIR 400 MG: 200 CAPSULE ORAL at 09:09

## 2020-01-01 RX ADMIN — HEPARIN 500 UNITS: 100 SYRINGE at 16:33

## 2020-01-01 RX ADMIN — OBINUTUZUMAB 1000 MG: 1000 INJECTION, SOLUTION, CONCENTRATE INTRAVENOUS at 14:18

## 2020-01-01 RX ADMIN — IOPAMIDOL 75 ML: 755 INJECTION, SOLUTION INTRAVENOUS at 10:56

## 2020-01-01 RX ADMIN — ACYCLOVIR 400 MG: 200 CAPSULE ORAL at 10:39

## 2020-01-01 RX ADMIN — SODIUM CHLORIDE, PRESERVATIVE FREE 5 ML: 5 INJECTION INTRAVENOUS at 16:33

## 2020-01-01 RX ADMIN — DEXAMETHASONE SODIUM PHOSPHATE 10 MG: 10 INJECTION, SOLUTION INTRAMUSCULAR; INTRAVENOUS at 12:43

## 2020-01-01 RX ADMIN — DIPHENHYDRAMINE HYDROCHLORIDE 25 MG: 50 INJECTION, SOLUTION INTRAMUSCULAR; INTRAVENOUS at 13:56

## 2020-01-01 RX ADMIN — DIPHENHYDRAMINE HYDROCHLORIDE 25 MG: 50 INJECTION, SOLUTION INTRAMUSCULAR; INTRAVENOUS at 12:42

## 2020-01-01 RX ADMIN — SODIUM CHLORIDE, PRESERVATIVE FREE 20 ML: 5 INJECTION INTRAVENOUS at 15:42

## 2020-01-01 RX ADMIN — CYCLOPHOSPHAMIDE 900 MG: 1 INJECTION, POWDER, FOR SOLUTION INTRAVENOUS; ORAL at 15:07

## 2020-01-01 RX ADMIN — VINCRISTINE SULFATE 2 MG: 1 INJECTION, SOLUTION INTRAVENOUS at 17:09

## 2020-01-01 RX ADMIN — CYCLOPHOSPHAMIDE 1120 MG: 1 INJECTION, POWDER, FOR SOLUTION INTRAVENOUS; ORAL at 16:37

## 2020-01-01 RX ADMIN — SODIUM CHLORIDE 3 MG: 9 INJECTION, SOLUTION INTRAVENOUS at 13:34

## 2020-01-01 RX ADMIN — RITUXIMAB 560 MG: 10 INJECTION, SOLUTION INTRAVENOUS at 13:54

## 2020-01-01 RX ADMIN — ACETAMINOPHEN 1000 MG: 500 TABLET, FILM COATED ORAL at 12:43

## 2020-01-01 RX ADMIN — DEXAMETHASONE SODIUM PHOSPHATE 10 MG: 10 INJECTION, SOLUTION INTRAMUSCULAR; INTRAVENOUS at 08:24

## 2020-01-01 RX ADMIN — PALONOSETRON HYDROCHLORIDE 0.25 MG: 0.25 INJECTION, SOLUTION INTRAVENOUS at 14:01

## 2020-01-01 RX ADMIN — CELECOXIB 200 MG: 200 CAPSULE ORAL at 07:07

## 2020-01-01 RX ADMIN — MAGNESIUM SULFATE HEPTAHYDRATE 1 G: 1 INJECTION, SOLUTION INTRAVENOUS at 11:35

## 2020-01-01 RX ADMIN — SODIUM CHLORIDE, SODIUM LACTATE, POTASSIUM CHLORIDE, AND CALCIUM CHLORIDE: 600; 310; 30; 20 INJECTION, SOLUTION INTRAVENOUS at 08:19

## 2020-01-01 RX ADMIN — POTASSIUM CHLORIDE 10 MEQ: 7.46 INJECTION, SOLUTION INTRAVENOUS at 20:27

## 2020-01-01 RX ADMIN — PEGFILGRASTIM 6 MG: KIT SUBCUTANEOUS at 17:10

## 2020-01-01 RX ADMIN — HYDRALAZINE HYDROCHLORIDE 10 MG: 20 INJECTION INTRAMUSCULAR; INTRAVENOUS at 20:24

## 2020-01-01 RX ADMIN — VINCRISTINE SULFATE 2 MG: 1 INJECTION, SOLUTION INTRAVENOUS at 16:41

## 2020-01-01 RX ADMIN — BENDAMUSTINE HYDROCHLORIDE 119.2 MG: 25 INJECTION, SOLUTION INTRAVENOUS at 13:58

## 2020-01-01 RX ADMIN — SODIUM CHLORIDE: 9 INJECTION, SOLUTION INTRAVENOUS at 16:18

## 2020-01-01 RX ADMIN — PANTOPRAZOLE SODIUM 40 MG: 40 INJECTION, POWDER, FOR SOLUTION INTRAVENOUS at 23:29

## 2020-01-01 RX ADMIN — PALONOSETRON 0.25 MG: 0.05 INJECTION, SOLUTION INTRAVENOUS at 14:09

## 2020-01-01 RX ADMIN — PEGFILGRASTIM 6 MG: KIT SUBCUTANEOUS at 16:48

## 2020-01-01 RX ADMIN — SODIUM CHLORIDE 150 MG: 900 INJECTION, SOLUTION INTRAVENOUS at 14:12

## 2020-01-01 RX ADMIN — POTASSIUM CHLORIDE 20 MEQ: 1500 TABLET, EXTENDED RELEASE ORAL at 20:27

## 2020-01-01 RX ADMIN — SODIUM CHLORIDE, PRESERVATIVE FREE 10 ML: 5 INJECTION INTRAVENOUS at 10:41

## 2020-01-01 RX ADMIN — RITUXIMAB 560 MG: 10 INJECTION, SOLUTION INTRAVENOUS at 14:27

## 2020-01-01 RX ADMIN — GABAPENTIN 300 MG: 300 CAPSULE ORAL at 07:06

## 2020-01-01 RX ADMIN — POTASSIUM CHLORIDE 40 MEQ: 1500 TABLET, EXTENDED RELEASE ORAL at 14:27

## 2020-01-01 RX ADMIN — HEPARIN 500 UNITS: 100 SYRINGE at 12:03

## 2020-01-01 RX ADMIN — LISINOPRIL 20 MG: 20 TABLET ORAL at 09:09

## 2020-01-01 RX ADMIN — PALONOSETRON 0.25 MG: 0.05 INJECTION, SOLUTION INTRAVENOUS at 13:56

## 2020-01-01 RX ADMIN — RITUXIMAB 560 MG: 10 INJECTION, SOLUTION INTRAVENOUS at 14:08

## 2020-01-01 RX ADMIN — ONDANSETRON HYDROCHLORIDE 4 MG: 2 INJECTION, SOLUTION INTRAMUSCULAR; INTRAVENOUS at 08:40

## 2020-01-01 RX ADMIN — ACETAMINOPHEN 1000 MG: 500 TABLET, FILM COATED ORAL at 14:01

## 2020-01-01 RX ADMIN — IOPAMIDOL 75 ML: 755 INJECTION, SOLUTION INTRAVENOUS at 09:48

## 2020-01-01 RX ADMIN — ACETAMINOPHEN 1000 MG: 500 TABLET ORAL at 11:31

## 2020-01-01 RX ADMIN — DIPHENHYDRAMINE HYDROCHLORIDE 25 MG: 50 INJECTION, SOLUTION INTRAMUSCULAR; INTRAVENOUS at 13:11

## 2020-01-01 RX ADMIN — FLUDEOXYGLUCOSE F 18 10 MILLICURIE: 200 INJECTION, SOLUTION INTRAVENOUS at 15:04

## 2020-01-01 RX ADMIN — SODIUM CHLORIDE, PRESERVATIVE FREE 10 ML: 5 INJECTION INTRAVENOUS at 16:11

## 2020-01-01 RX ADMIN — SODIUM CHLORIDE 20 ML/HR: 9 INJECTION, SOLUTION INTRAVENOUS at 13:36

## 2020-01-01 RX ADMIN — PEGFILGRASTIM 6 MG: KIT SUBCUTANEOUS at 16:33

## 2020-01-01 RX ADMIN — FENTANYL CITRATE 25 MCG: 50 INJECTION INTRAMUSCULAR; INTRAVENOUS at 08:25

## 2020-01-01 RX ADMIN — FAMOTIDINE 20 MG: 20 TABLET ORAL at 07:07

## 2020-01-01 RX ADMIN — PROPOFOL 20 MG: 10 INJECTION, EMULSION INTRAVENOUS at 09:03

## 2020-01-01 RX ADMIN — POTASSIUM CHLORIDE 40 MEQ: 1500 TABLET, EXTENDED RELEASE ORAL at 10:41

## 2020-01-01 RX ADMIN — VINCRISTINE SULFATE 2 MG: 1 INJECTION, SOLUTION INTRAVENOUS at 15:42

## 2020-01-01 RX ADMIN — SODIUM CHLORIDE: 9 INJECTION, SOLUTION INTRAVENOUS at 07:24

## 2020-01-01 RX ADMIN — ACETAMINOPHEN 975 MG: 325 TABLET ORAL at 07:06

## 2020-01-01 RX ADMIN — SODIUM CHLORIDE 76 MG: 900 INJECTION, SOLUTION INTRAVENOUS at 14:37

## 2020-01-01 RX ADMIN — ACETAMINOPHEN 1000 MG: 500 TABLET ORAL at 13:36

## 2020-01-01 RX ADMIN — ZOLEDRONIC ACID 4 MG: 4 INJECTION, SOLUTION, CONCENTRATE INTRAVENOUS at 23:30

## 2020-01-01 RX ADMIN — ACYCLOVIR 400 MG: 200 CAPSULE ORAL at 00:54

## 2020-01-01 RX ADMIN — VINCRISTINE SULFATE 2 MG: 1 INJECTION, SOLUTION INTRAVENOUS at 16:22

## 2020-01-01 RX ADMIN — SODIUM CHLORIDE 150 MG: 900 INJECTION, SOLUTION INTRAVENOUS at 13:21

## 2020-01-01 RX ADMIN — DIPHENHYDRAMINE HYDROCHLORIDE 25 MG: 50 INJECTION, SOLUTION INTRAMUSCULAR; INTRAVENOUS at 14:01

## 2020-01-01 RX ADMIN — HEPARIN 500 UNITS: 100 SYRINGE at 17:25

## 2020-01-01 RX ADMIN — PALONOSETRON HYDROCHLORIDE 0.25 MG: 0.25 INJECTION, SOLUTION INTRAVENOUS at 13:10

## 2020-01-01 RX ADMIN — RITUXIMAB 560 MG: 10 INJECTION, SOLUTION INTRAVENOUS at 13:14

## 2020-01-01 RX ADMIN — VINCRISTINE SULFATE 2 MG: 1 INJECTION, SOLUTION INTRAVENOUS at 17:18

## 2020-01-01 RX ADMIN — FENTANYL CITRATE 50 MCG: 50 INJECTION INTRAMUSCULAR; INTRAVENOUS at 08:42

## 2020-01-01 RX ADMIN — ACETAMINOPHEN 1000 MG: 500 TABLET, FILM COATED ORAL at 09:46

## 2020-01-01 RX ADMIN — ALLOPURINOL 100 MG: 100 TABLET ORAL at 09:01

## 2020-01-01 RX ADMIN — SODIUM CHLORIDE 150 MG: 900 INJECTION, SOLUTION INTRAVENOUS at 12:37

## 2020-01-01 RX ADMIN — FENTANYL CITRATE 50 MCG: 50 INJECTION INTRAMUSCULAR; INTRAVENOUS at 09:03

## 2020-01-01 RX ADMIN — PROPOFOL 30 MG: 10 INJECTION, EMULSION INTRAVENOUS at 08:38

## 2020-01-01 RX ADMIN — SODIUM CHLORIDE, PRESERVATIVE FREE 20 ML: 5 INJECTION INTRAVENOUS at 12:03

## 2020-01-01 RX ADMIN — ENOXAPARIN SODIUM 40 MG: 40 INJECTION SUBCUTANEOUS at 10:40

## 2020-01-01 RX ADMIN — LISINOPRIL 20 MG: 20 TABLET ORAL at 09:01

## 2020-01-01 RX ADMIN — CYCLOPHOSPHAMIDE 1120 MG: 1 INJECTION, POWDER, FOR SOLUTION INTRAVENOUS; ORAL at 16:06

## 2020-01-01 RX ADMIN — Medication 10 ML: at 17:13

## 2020-01-01 RX ADMIN — SODIUM CHLORIDE 76 MG: 900 INJECTION, SOLUTION INTRAVENOUS at 16:09

## 2020-01-01 RX ADMIN — PEGFILGRASTIM 6 MG: KIT SUBCUTANEOUS at 16:05

## 2020-01-01 RX ADMIN — PANTOPRAZOLE SODIUM 40 MG: 40 INJECTION, POWDER, FOR SOLUTION INTRAVENOUS at 10:40

## 2020-01-01 RX ADMIN — HEPARIN 500 UNITS: 100 SYRINGE at 13:21

## 2020-01-01 ASSESSMENT — PAIN DESCRIPTION - LOCATION
LOCATION: ABDOMEN;LEG
LOCATION: BREAST;LEG

## 2020-01-01 ASSESSMENT — ENCOUNTER SYMPTOMS
RHINORRHEA: 0
ROS SKIN COMMENTS: HAIR LOSS
SHORTNESS OF BREATH: 0
ABDOMINAL PAIN: 0
SINUS PRESSURE: 0
SINUS PRESSURE: 0
CONSTIPATION: 0
EYE PAIN: 0
SORE THROAT: 0
SORE THROAT: 0
CHEST TIGHTNESS: 0
COUGH: 0
CONSTIPATION: 0
WHEEZING: 0
COUGH: 0
NAUSEA: 0
EYE REDNESS: 0
DIARRHEA: 0
NAUSEA: 0
ABDOMINAL PAIN: 0
VOMITING: 0
COLOR CHANGE: 0
TROUBLE SWALLOWING: 0
DIARRHEA: 0
SHORTNESS OF BREATH: 0
BLOOD IN STOOL: 0
VOICE CHANGE: 0
VOMITING: 0
TROUBLE SWALLOWING: 0

## 2020-01-01 ASSESSMENT — PAIN SCALES - GENERAL
PAINLEVEL_OUTOF10: 0
PAINLEVEL_OUTOF10: 5
PAINLEVEL_OUTOF10: 0
PAINLEVEL_OUTOF10: 2
PAINLEVEL_OUTOF10: 0
PAINLEVEL_OUTOF10: 9
PAINLEVEL_OUTOF10: 0
PAINLEVEL_OUTOF10: 9
PAINLEVEL_OUTOF10: 0
PAINLEVEL_OUTOF10: 2

## 2020-01-01 ASSESSMENT — PAIN DESCRIPTION - PAIN TYPE
TYPE: CHRONIC PAIN

## 2020-01-01 ASSESSMENT — LIFESTYLE VARIABLES
SMOKING_STATUS: 0
SMOKING_STATUS: 0
HOW OFTEN DO YOU HAVE A DRINK CONTAINING ALCOHOL: 0

## 2020-01-01 ASSESSMENT — PATIENT HEALTH QUESTIONNAIRE - PHQ9
SUM OF ALL RESPONSES TO PHQ9 QUESTIONS 1 & 2: 0
2. FEELING DOWN, DEPRESSED OR HOPELESS: 0
SUM OF ALL RESPONSES TO PHQ QUESTIONS 1-9: 0
SUM OF ALL RESPONSES TO PHQ QUESTIONS 1-9: 0
1. LITTLE INTEREST OR PLEASURE IN DOING THINGS: 0

## 2020-01-01 ASSESSMENT — PAIN DESCRIPTION - ORIENTATION: ORIENTATION: RIGHT

## 2020-01-15 ENCOUNTER — OFFICE VISIT (OUTPATIENT)
Dept: HEMATOLOGY | Age: 75
End: 2020-01-15
Payer: MEDICARE

## 2020-01-15 ENCOUNTER — HOSPITAL ENCOUNTER (OUTPATIENT)
Dept: INFUSION THERAPY | Age: 75
Discharge: HOME | End: 2020-01-15
Payer: MEDICARE

## 2020-01-15 VITALS
OXYGEN SATURATION: 95 % | HEIGHT: 59 IN | DIASTOLIC BLOOD PRESSURE: 80 MMHG | HEART RATE: 108 BPM | WEIGHT: 120.8 LBS | SYSTOLIC BLOOD PRESSURE: 148 MMHG | BODY MASS INDEX: 24.35 KG/M2 | TEMPERATURE: 98.2 F

## 2020-01-15 DIAGNOSIS — C83.00 LYMPHOMA, SMALL LYMPHOCYTIC (HCC): Primary | ICD-10-CM

## 2020-01-15 PROCEDURE — 96375 TX/PRO/DX INJ NEW DRUG ADDON: CPT

## 2020-01-15 PROCEDURE — 6370000000 HC RX 637 (ALT 250 FOR IP): Performed by: INTERNAL MEDICINE

## 2020-01-15 PROCEDURE — 85025 COMPLETE CBC W/AUTO DIFF WBC: CPT

## 2020-01-15 PROCEDURE — G8484 FLU IMMUNIZE NO ADMIN: HCPCS | Performed by: INTERNAL MEDICINE

## 2020-01-15 PROCEDURE — 1123F ACP DISCUSS/DSCN MKR DOCD: CPT | Performed by: INTERNAL MEDICINE

## 2020-01-15 PROCEDURE — 3017F COLORECTAL CA SCREEN DOC REV: CPT | Performed by: INTERNAL MEDICINE

## 2020-01-15 PROCEDURE — 1036F TOBACCO NON-USER: CPT | Performed by: INTERNAL MEDICINE

## 2020-01-15 PROCEDURE — 96413 CHEMO IV INFUSION 1 HR: CPT

## 2020-01-15 PROCEDURE — 4040F PNEUMOC VAC/ADMIN/RCVD: CPT | Performed by: INTERNAL MEDICINE

## 2020-01-15 PROCEDURE — 1090F PRES/ABSN URINE INCON ASSESS: CPT | Performed by: INTERNAL MEDICINE

## 2020-01-15 PROCEDURE — 99214 OFFICE O/P EST MOD 30 MIN: CPT | Performed by: INTERNAL MEDICINE

## 2020-01-15 PROCEDURE — 2580000003 HC RX 258: Performed by: INTERNAL MEDICINE

## 2020-01-15 PROCEDURE — 6360000002 HC RX W HCPCS: Performed by: INTERNAL MEDICINE

## 2020-01-15 PROCEDURE — G8399 PT W/DXA RESULTS DOCUMENT: HCPCS | Performed by: INTERNAL MEDICINE

## 2020-01-15 PROCEDURE — G8427 DOCREV CUR MEDS BY ELIG CLIN: HCPCS | Performed by: INTERNAL MEDICINE

## 2020-01-15 PROCEDURE — G8420 CALC BMI NORM PARAMETERS: HCPCS | Performed by: INTERNAL MEDICINE

## 2020-01-15 PROCEDURE — 96367 TX/PROPH/DG ADDL SEQ IV INF: CPT

## 2020-01-15 RX ORDER — SODIUM CHLORIDE 0.9 % (FLUSH) 0.9 %
5 SYRINGE (ML) INJECTION PRN
Status: CANCELLED | OUTPATIENT
Start: 2020-01-15

## 2020-01-15 RX ORDER — HEPARIN SODIUM (PORCINE) LOCK FLUSH IV SOLN 100 UNIT/ML 100 UNIT/ML
300 SOLUTION INTRAVENOUS PRN
Status: CANCELLED | OUTPATIENT
Start: 2020-01-15

## 2020-01-15 RX ORDER — EPINEPHRINE 1 MG/ML
0.3 INJECTION, SOLUTION, CONCENTRATE INTRAVENOUS PRN
Status: CANCELLED | OUTPATIENT
Start: 2020-01-15

## 2020-01-15 RX ORDER — DIPHENHYDRAMINE HYDROCHLORIDE 50 MG/ML
50 INJECTION INTRAMUSCULAR; INTRAVENOUS ONCE
Status: CANCELLED | OUTPATIENT
Start: 2020-01-15

## 2020-01-15 RX ORDER — SODIUM CHLORIDE 0.9 % (FLUSH) 0.9 %
10 SYRINGE (ML) INJECTION PRN
Status: CANCELLED | OUTPATIENT
Start: 2020-01-15

## 2020-01-15 RX ORDER — ACETAMINOPHEN 500 MG
1000 TABLET ORAL ONCE
Status: COMPLETED | OUTPATIENT
Start: 2020-01-15 | End: 2020-01-15

## 2020-01-15 RX ORDER — ACETAMINOPHEN 325 MG/1
1000 TABLET ORAL ONCE
Status: CANCELLED
Start: 2020-01-15

## 2020-01-15 RX ORDER — SODIUM CHLORIDE 0.9 % (FLUSH) 0.9 %
10 SYRINGE (ML) INJECTION PRN
Status: DISCONTINUED | OUTPATIENT
Start: 2020-01-15 | End: 2020-01-16 | Stop reason: HOSPADM

## 2020-01-15 RX ORDER — DIPHENHYDRAMINE HYDROCHLORIDE 50 MG/ML
50 INJECTION INTRAMUSCULAR; INTRAVENOUS ONCE
Status: COMPLETED | OUTPATIENT
Start: 2020-01-15 | End: 2020-01-15

## 2020-01-15 RX ORDER — MEPERIDINE HYDROCHLORIDE 50 MG/ML
12.5 INJECTION INTRAMUSCULAR; INTRAVENOUS; SUBCUTANEOUS ONCE
Status: CANCELLED | OUTPATIENT
Start: 2020-01-15

## 2020-01-15 RX ORDER — ACYCLOVIR 400 MG/1
400 TABLET ORAL 2 TIMES DAILY
Qty: 60 TABLET | Refills: 2 | Status: SHIPPED | OUTPATIENT
Start: 2020-01-15 | End: 2020-01-01

## 2020-01-15 RX ORDER — HEPARIN SODIUM (PORCINE) LOCK FLUSH IV SOLN 100 UNIT/ML 100 UNIT/ML
300 SOLUTION INTRAVENOUS PRN
Status: DISCONTINUED | OUTPATIENT
Start: 2020-01-15 | End: 2020-01-16 | Stop reason: HOSPADM

## 2020-01-15 RX ORDER — SODIUM CHLORIDE 9 MG/ML
20 INJECTION, SOLUTION INTRAVENOUS ONCE
Status: CANCELLED | OUTPATIENT
Start: 2020-01-15

## 2020-01-15 RX ORDER — SODIUM CHLORIDE 9 MG/ML
INJECTION, SOLUTION INTRAVENOUS CONTINUOUS
Status: CANCELLED | OUTPATIENT
Start: 2020-01-15

## 2020-01-15 RX ORDER — METHYLPREDNISOLONE SODIUM SUCCINATE 125 MG/2ML
125 INJECTION, POWDER, LYOPHILIZED, FOR SOLUTION INTRAMUSCULAR; INTRAVENOUS ONCE
Status: CANCELLED | OUTPATIENT
Start: 2020-01-15

## 2020-01-15 RX ADMIN — DEXAMETHASONE SODIUM PHOSPHATE: 10 INJECTION, SOLUTION INTRAMUSCULAR; INTRAVENOUS at 11:26

## 2020-01-15 RX ADMIN — OBINUTUZUMAB 1000 MG: 1000 INJECTION, SOLUTION, CONCENTRATE INTRAVENOUS at 12:00

## 2020-01-15 RX ADMIN — DIPHENHYDRAMINE HYDROCHLORIDE 50 MG: 50 INJECTION, SOLUTION INTRAMUSCULAR; INTRAVENOUS at 11:21

## 2020-01-15 RX ADMIN — ACETAMINOPHEN 1000 MG: 500 TABLET ORAL at 11:20

## 2020-01-15 ASSESSMENT — PAIN SCALES - GENERAL: PAINLEVEL_OUTOF10: 0

## 2020-01-15 NOTE — PROGRESS NOTES
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 deletion. She had a high burden of disease with significant inguinal bilateral adenopathy greater on the right. The right inguinal adenopathy had been causing severe right lower extremity edema up to the root of the right thigh. She has some difficult with ambulation due to the severe edema. She has also been experiencing nausea, diarrhea and hypertension secondary to Imbruvica. Therefore, she was recommended to switch to frontline treatment with Gazyva/venetoclax. She presents today for initiation for cycle #2. She will also be started on venetoclax. She noticed very mild improvement of the edema in her right lower extremity. She also reported just mild improvement of her right inguinal adenopathy. Hematology history  Ms Amanda Gregorio was first seen by me on 3/27/2019 referred by Dr. Deborah Felix for a diagnosis of B-cell lymphoma. The patient had a history of a right inguinal adenopathy that was treated with antibiotic without resolution. Therefore a biopsy was performed. 12/13/2018-CT abdomen pelvis showed a moderately prominent right inguinal lymph node. In addition, an oval solid nodule in the left labium/left vagina wall measures 2.5 x 1.6 cm.   2/20/2019-the patient was seen by Dr. Carlos Vences, who requested an ultrasound of the pelvis.    2/28/2019-US pelvis showed a hypoechoic 2.3 x 2.1 x 2.1 cm area within the vagina concerning for Bartholin's cyst.   2/28/2019-she was seen by Dr. Deborah Felix from General surgery with complains of enlarging right inguinal adenopathy, and therefore was recommended FNA biopsy   3/4/2019-FNA biopsy consistent B-cell lymphoma on morphology and IHC (positive for CD3, CD5 and CD20 and negative for BCL-2, BCL 6, cyclin D1, CD30 and MUM-1. Extensive necrosis, and therefore, further characterization was not possible. 3/27/2019-she was first seen by me. 4/3/2019-bone marrow biopsy/aspirate showed involvement by a cyclin D1 negative, CD5/CD23 positive monoclonal B-cell population consistent with involvement of bone marrow by B-cell SLL (10%). CD20 positive. Normal female karyotype 55 XX. FISH cytogenetics 13 q deletion   2019-CT chest showed no evidence of mediastinal, hilar adenopathy. 2019-PET scan showed FDG avid enlarged right pelvic and inguinal lymph nodes. Suggestive for active lymphoma. Specifically, a right obturator lymph node with SUV 23. Right external iliac lymph node with SUV 27. Right inguinal lymph node with SUV 11. May through 2019-Imbruvica with progressive disease or poor tolerance. 12/3/2019- venetoclax/Gazyva fixed duration treatment 1 year expected.       ECO    Treatment related toxicity: Fatigue,  right lower extremity edema, diarrhea, nausea, hypertension      Past medical history:  Past Medical History:   Diagnosis Date    Body mass index (bmi) 26.0-26.9, adult     Hyperlipidemia     Hypertension     Osteoporosis     Unspecified b-cell lymphoma, lymph nodes of inguinal region and lower limb (HCC)         Past surgical history:  Past Surgical History:   Procedure Laterality Date    CHOLECYSTECTOMY      COLONOSCOPY  2009    Dr Scarlet Gan, TOTAL ABDOMINAL      LYMPH NODE BIOPSY Right 3/4/2019    EXCISION OF SUPERFICIAL LYMPH NODE, RIGHT GROIN performed by Hellen Rodriguez DO at [address] history:  Social History     Socioeconomic History    Marital status:      Spouse name: Not on file    Number of children: Not on file    3    calcitonin (MIACALCIN) 200 UNIT/ACT nasal spray USE 1 SPRAY(S) INTRANASALLY ONCE DAILY 4 mL 3    acetaminophen (TYLENOL) 500 MG tablet Take 2 tablets by mouth once for 1 dose 120 tablet 3    BIOTIN PO Take by mouth      vitamin D (CHOLECALCIFEROL) 1000 UNIT TABS tablet Take 1,000 Units by mouth daily      Glucosamine-Chondroit-Vit C-Mn (GLUCOSAMINE 1500 COMPLEX PO) Take 2 tablets by mouth daily      Garlic 0690 MG CAPS Take by mouth      Calcium Carb-Cholecalciferol 600-500 MG-UNIT CAPS Take by mouth      Ascorbic Acid (VITAMIN C) 500 MG tablet Take 1,000 mg by mouth daily      Vitamins-Lipotropics (LIPOFLAVONOID PO) Take by mouth      magnesium gluconate (MAGONATE) 500 MG tablet Take 500 mg by mouth daily Indications: 0.5 tablet daily       No current facility-administered medications for this visit.       Facility-Administered Medications Ordered in Other Visits   Medication Dose Route Frequency Provider Last Rate Last Dose    obinutuzumab (GAZYVA) 1,000 mg in sodium chloride 0.9 % 250 mL chemo IVPB  1,000 mg Intravenous Once Aristeo Watson MD 29 mL/hr at 01/15/20 1200 1,000 mg at 01/15/20 1200    heparin flush 100 UNIT/ML injection 300 Units  300 Units Intracatheter PRN Aristeo Watson MD        sodium chloride flush 0.9 % injection 10 mL  10 mL Intravenous PRN Aristeo Watson MD            REVIEW OF SYSTEMS:    Constitutional: no fever, no night sweats, fatigue;   HEENT: no blurring of vision, no double vision, no hearing difficulty, no tinnitus,no ulceration, no dental caries, no dysphagia  Lungs: no cough, no shortness of breath, no wheeze;   CVS: no palpitation, no chest pain, no shortness of breath; hypertension  GI: no abdominal pain, no nausea , no vomiting, no constipation; diarrhea  GABE: no dysuria, frequency and urgency, no hematuria, no kidney stones;   Musculoskeletal: no joint pain, swelling , stiffness; right  lower extremity edema   endocrine: no polyuria, appropriate labs and imaging studies. I reviewed relevant medical records and others physicians notes. I discussed the plans of care with the patient. I answered all the questions to the patients satisfaction. (Please note that portions of this note were completed with a voice recognition program. Efforts were made to edit the dictations but occasionally words are mis-transcribed.)  Over 50% of the total visit time of 25 minutes in face to face encounter with the patient, out of which more than 50% of the time was spent in counseling patient or family and coordination of care. Counseling included but was not limited to time spent reviewing labs, imaging studies/ treatment plan and answering questions.

## 2020-01-23 ENCOUNTER — TELEPHONE (OUTPATIENT)
Dept: INTERNAL MEDICINE | Age: 75
End: 2020-01-23

## 2020-01-23 NOTE — TELEPHONE ENCOUNTER
Pt called has a cough and sneezing she said chest and side is sore from coughing she said every time she coughs feels like someone is stabbing

## 2020-01-24 ENCOUNTER — OFFICE VISIT (OUTPATIENT)
Dept: INTERNAL MEDICINE | Age: 75
End: 2020-01-24
Payer: MEDICARE

## 2020-01-24 VITALS
SYSTOLIC BLOOD PRESSURE: 138 MMHG | DIASTOLIC BLOOD PRESSURE: 78 MMHG | HEART RATE: 109 BPM | BODY MASS INDEX: 23.87 KG/M2 | OXYGEN SATURATION: 97 % | WEIGHT: 118.4 LBS | HEIGHT: 59 IN

## 2020-01-24 PROCEDURE — G8420 CALC BMI NORM PARAMETERS: HCPCS | Performed by: INTERNAL MEDICINE

## 2020-01-24 PROCEDURE — 4040F PNEUMOC VAC/ADMIN/RCVD: CPT | Performed by: INTERNAL MEDICINE

## 2020-01-24 PROCEDURE — G8399 PT W/DXA RESULTS DOCUMENT: HCPCS | Performed by: INTERNAL MEDICINE

## 2020-01-24 PROCEDURE — 1036F TOBACCO NON-USER: CPT | Performed by: INTERNAL MEDICINE

## 2020-01-24 PROCEDURE — G8427 DOCREV CUR MEDS BY ELIG CLIN: HCPCS | Performed by: INTERNAL MEDICINE

## 2020-01-24 PROCEDURE — 1090F PRES/ABSN URINE INCON ASSESS: CPT | Performed by: INTERNAL MEDICINE

## 2020-01-24 PROCEDURE — G8484 FLU IMMUNIZE NO ADMIN: HCPCS | Performed by: INTERNAL MEDICINE

## 2020-01-24 PROCEDURE — 3017F COLORECTAL CA SCREEN DOC REV: CPT | Performed by: INTERNAL MEDICINE

## 2020-01-24 PROCEDURE — 1123F ACP DISCUSS/DSCN MKR DOCD: CPT | Performed by: INTERNAL MEDICINE

## 2020-01-24 PROCEDURE — 99213 OFFICE O/P EST LOW 20 MIN: CPT | Performed by: INTERNAL MEDICINE

## 2020-01-24 RX ORDER — CEFDINIR 300 MG/1
300 CAPSULE ORAL 2 TIMES DAILY
Qty: 16 CAPSULE | Refills: 0 | Status: SHIPPED | OUTPATIENT
Start: 2020-01-24 | End: 2020-02-01

## 2020-01-24 ASSESSMENT — PATIENT HEALTH QUESTIONNAIRE - PHQ9
SUM OF ALL RESPONSES TO PHQ9 QUESTIONS 1 & 2: 0
1. LITTLE INTEREST OR PLEASURE IN DOING THINGS: 0
SUM OF ALL RESPONSES TO PHQ QUESTIONS 1-9: 0
SUM OF ALL RESPONSES TO PHQ QUESTIONS 1-9: 0
2. FEELING DOWN, DEPRESSED OR HOPELESS: 0

## 2020-01-24 ASSESSMENT — ENCOUNTER SYMPTOMS
SINUS PRESSURE: 1
COUGH: 1
SORE THROAT: 1
SINUS PAIN: 1

## 2020-01-24 NOTE — PROGRESS NOTES
Vitamins-Minerals (PRESERVISION AREDS 2+MULTI VIT PO) Take by mouth      lisinopril-hydrochlorothiazide (PRINZIDE;ZESTORETIC) 20-12.5 MG per tablet TAKE 1 TABLET BY MOUTH ONCE DAILY 90 tablet 2    alendronate (FOSAMAX) 70 MG tablet Take 1 tablet by mouth every 7 days 12 tablet 3    calcitonin (MIACALCIN) 200 UNIT/ACT nasal spray USE 1 SPRAY(S) INTRANASALLY ONCE DAILY 4 mL 3    acetaminophen (TYLENOL) 500 MG tablet Take 2 tablets by mouth once for 1 dose 120 tablet 3    BIOTIN PO Take by mouth      vitamin D (CHOLECALCIFEROL) 1000 UNIT TABS tablet Take 1,000 Units by mouth daily      Glucosamine-Chondroit-Vit C-Mn (GLUCOSAMINE 1500 COMPLEX PO) Take 2 tablets by mouth daily      Garlic 2726 MG CAPS Take by mouth      Calcium Carb-Cholecalciferol 600-500 MG-UNIT CAPS Take by mouth      Ascorbic Acid (VITAMIN C) 500 MG tablet Take 1,000 mg by mouth daily      Vitamins-Lipotropics (LIPOFLAVONOID PO) Take by mouth      magnesium gluconate (MAGONATE) 500 MG tablet Take 500 mg by mouth daily Indications: 0.5 tablet daily      simvastatin (ZOCOR) 40 MG tablet Take 1 tablet by mouth nightly (Patient not taking: Reported on 2020) 90 tablet 2     No current facility-administered medications for this visit.       No Known Allergies    Social History     Socioeconomic History    Marital status:      Spouse name: None    Number of children: None    Years of education: None    Highest education level: None   Occupational History    None   Social Needs    Financial resource strain: None    Food insecurity:     Worry: None     Inability: None    Transportation needs:     Medical: None     Non-medical: None   Tobacco Use    Smoking status: Former Smoker     Packs/day: 1.00     Years: 2.00     Pack years: 2.00     Types: Cigarettes     Start date:      Last attempt to quit:      Years since quittin.0    Smokeless tobacco: Never Used    Tobacco comment: Retired from being a  and prolonged expiratory phase. There is NO significantwheezing or tightness when patient is coughing. CARDIOVASCULAR: Regular rate, no murmurs, no rubs or gallops present. ABDOMEN: Soft, non-tender with good BS and no organomegaly. EXTREMITIES: Warm with goodperipheral pulses and no peripheral edema noticed      Lipid panel:   Lab Results   Component Value Date    TRIG 137 12/06/2019    HDL 64 12/06/2019      CBC:  WBC (K/uL)   Date Value   05/31/2019 7.0     Hemoglobin (g/dL)   Date Value   05/31/2019 13.7     Hematocrit (%)   Date Value   05/31/2019 41.2     Platelets (K/uL)   Date Value   05/31/2019 215         Assessment and Plan      Acute bronchitis and bronchiolitis    Cough    Nasal sinus congestion    Known B cell lymphoma    RX:      -     cefdinir (OMNICEF) 300 MG capsule; Take 1 capsule by mouth 2 times daily for 8 days  Breo inhaler sample  mucinex otc  If sx not improving and resolving , if sx continue or re-occur pt has been instructed to call us and / or return here for follow- up evaluation      No orders of the defined types were placed in this encounter. New Prescriptions    CEFDINIR (OMNICEF) 300 MG CAPSULE    Take 1 capsule by mouth 2 times daily for 8 days      There are no Patient Instructions on file for this visit. No follow-ups on file. EMR Dragon/transcription disclaimer:Significant part of thisencounter note is electronic transcription/translation of spoken language to printed text. The electronic translation of spoken language may be erroneous, or at times, nonsensical words or phrases may be inadvertentlytranscribed.  Although I have reviewed the note for such errors, some may still exist.

## 2020-02-11 NOTE — PROGRESS NOTES
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 deletion. She had a very high burden of disease with significant inguinal bilateral adenopathy. She has been started on treatment with Gazyva and venetoclax. She presents today for initiation of cycle #3 of Gazyva. She is doing well otherwise. She denies any nausea vomiting. She denies any diarrhea. She still has significant right lower extremity edema. She has not noticed any major improvement in the right inguinal adenopathy. She also has mild left inguinal adenopathy. She is going to start venetoclax 400 mg today. Hematology history  Ms Renny Kendall was first seen by me on 3/27/2019 referred by Dr. Maciel for a diagnosis of B-cell lymphoma. The patient had a history of a right inguinal adenopathy that was treated with antibiotic without resolution. Therefore a biopsy was performed. 12/13/2018-CT abdomen pelvis showed a moderately prominent right inguinal lymph node. In addition, an oval solid nodule in the left labium/left vagina wall measures 2.5 x 1.6 cm.   2/20/2019-the patient was seen by Dr. Kyra Wilde, who requested an ultrasound of the pelvis.    2/28/2019-US pelvis showed a hypoechoic 2.3 x 2.1 x 2.1 cm area within the vagina concerning for Bartholin's cyst.   2/28/2019-she was seen by Dr. Maciel from General surgery with complains of enlarging right inguinal adenopathy, and therefore was recommended FNA biopsy   3/4/2019-FNA biopsy consistent B-cell lymphoma on morphology and IHC (positive for CD3, CD5 and CD20 and negative for BCL-2, BCL 6, cyclin D1, CD30 and MUM-1. Extensive necrosis, and therefore, further characterization was not possible. 3/27/2019-she was first seen by me. 4/3/2019-bone marrow biopsy/aspirate showed involvement by a cyclin D1 negative, CD5/CD23 positive monoclonal B-cell population consistent with involvement of bone marrow by B-cell SLL (10%). CD20 positive. Normal female karyotype 55 XX. FISH cytogenetics 13 q deletion   2019-CT chest showed no evidence of mediastinal, hilar adenopathy. 2019-PET scan showed FDG avid enlarged right pelvic and inguinal lymph nodes. Suggestive for active lymphoma. Specifically, a right obturator lymph node with SUV 23. Right external iliac lymph node with SUV 27. Right inguinal lymph node with SUV 11. May through 2019-Imbruvica with progressive disease or poor tolerance. 12/3/2019- venetoclax/Gazyva fixed duration treatment 1 year expected.       ECO    Treatment related toxicity: Fatigue,  right lower extremity edema, diarrhea, nausea, hypertension      Past medical history:  Past Medical History:   Diagnosis Date    Body mass index (bmi) 26.0-26.9, adult     Hyperlipidemia     Hypertension     Osteoporosis     Unspecified b-cell lymphoma, lymph nodes of inguinal region and lower limb (HCC)         Past surgical history:  Past Surgical History:   Procedure Laterality Date    CHOLECYSTECTOMY      COLONOSCOPY  2009    Dr Nathan Ren, TOTAL ABDOMINAL      LYMPH NODE BIOPSY Right 3/4/2019    EXCISION OF SUPERFICIAL LYMPH NODE, RIGHT GROIN performed by Lynn Martinez DO at [address] history:  Social History     Socioeconomic History    Marital status:      Spouse name: Not on file    Number of children: Not on file    Years of education: Not on file    Highest education level: Not on file   Occupational History    Not on file   Social Needs    Financial resource strain: Not on file    Food insecurity: Worry: Not on file     Inability: Not on file    Transportation needs:     Medical: Not on file     Non-medical: Not on file   Tobacco Use    Smoking status: Former Smoker     Packs/day: 1.00     Years: 2.00     Pack years: 2.00     Types: Cigarettes     Start date: 65     Last attempt to quit: 1977     Years since quittin.1    Smokeless tobacco: Never Used    Tobacco comment: Retired from being a  and [address]   Substance and Sexual Activity    Alcohol use: No    Drug use: No    Sexual activity: Never   Lifestyle    Physical activity:     Days per week: Not on file     Minutes per session: Not on file    Stress: Not on file   Relationships    Social connections:     Talks on phone: Not on file     Gets together: Not on file     Attends [employer] service: Not on file     Active member of club or organization: Not on file     Attends meetings of clubs or organizations: Not on file     Relationship status: Not on file    Intimate partner violence:     Fear of current or ex partner: Not on file     Emotionally abused: Not on file     Physically abused: Not on file     Forced sexual activity: Not on file   Other Topics Concern    Not on file   Social History Narrative    Not on file        Family history:   Family History   Problem Relation Age of Onset    Lung Cancer Mother         not a smoker    Tuberculosis Father         Current Outpatient Medications   Medication Sig Dispense Refill    Venetoclax 10 & 50 & 100 MG TBPK Take by mouth Started 1/15/20-starter pack      acyclovir (ZOVIRAX) 400 MG tablet Take 1 tablet by mouth 2 times daily 60 tablet 2    Multiple Vitamins-Minerals (PRESERVISION AREDS 2+MULTI VIT PO) Take by mouth      lisinopril-hydrochlorothiazide (PRINZIDE;ZESTORETIC) 20-12.5 MG per tablet TAKE 1 TABLET BY MOUTH ONCE DAILY 90 tablet 2    simvastatin (ZOCOR) 40 MG tablet Take 1 tablet by mouth nightly (Patient not taking: Reported on 2020) 90 tablet 2    alendronate (FOSAMAX) 70 MG tablet Take 1 tablet by mouth every 7 days 12 tablet 3    calcitonin (MIACALCIN) 200 UNIT/ACT nasal spray USE 1 SPRAY(S) INTRANASALLY ONCE DAILY 4 mL 3    acetaminophen (TYLENOL) 500 MG tablet Take 2 tablets by mouth once for 1 dose 120 tablet 3    BIOTIN PO Take by mouth      vitamin D (CHOLECALCIFEROL) 1000 UNIT TABS tablet Take 1,000 Units by mouth daily      Glucosamine-Chondroit-Vit C-Mn (GLUCOSAMINE 1500 COMPLEX PO) Take 2 tablets by mouth daily      Garlic 2440 MG CAPS Take by mouth      Calcium Carb-Cholecalciferol 600-500 MG-UNIT CAPS Take by mouth      Ascorbic Acid (VITAMIN C) 500 MG tablet Take 1,000 mg by mouth daily      Vitamins-Lipotropics (LIPOFLAVONOID PO) Take by mouth      magnesium gluconate (MAGONATE) 500 MG tablet Take 500 mg by mouth daily Indications: 0.5 tablet daily       No current facility-administered medications for this visit.       Facility-Administered Medications Ordered in Other Visits   Medication Dose Route Frequency Provider Last Rate Last Dose    obinutuzumab (GAZYVA) 1,000 mg in sodium chloride 0.9 % 250 mL chemo IVPB  1,000 mg Intravenous Once Kal Nelson MD 58 mL/hr at 02/12/20 1308      sodium chloride flush 0.9 % injection 10 mL  10 mL Intravenous PRN Kal Nelson MD        heparin flush 100 UNIT/ML injection 300 Units  300 Units Intracatheter PRN Kal Nelson MD            REVIEW OF SYSTEMS:    Constitutional: no fever, no night sweats, fatigue;   HEENT: no blurring of vision, no double vision, no hearing difficulty, no tinnitus,no ulceration, no dental caries, no dysphagia  Lungs: no cough, no shortness of breath, no wheeze;   CVS: no palpitation, no chest pain, no shortness of breath; hypertension  GI: no abdominal pain, no nausea , no vomiting, no constipation; diarrhea  GABE: no dysuria, frequency and urgency, no hematuria, no kidney stones;   Musculoskeletal: no joint pain, swelling , stiffness; right small lymphocytic ([facility] [address]  -     Comprehensive Metabolic Panel; Future    Chemotherapy management, encounter for    Care plan discussed with patient    Edema of right lower extremity         #B-SLL stage IV 13 q deletion   Essentially, the patient has B-cell lymphoma SLL with symptomatic lymphadenopathy. Bone marrow with limited involvement around 10% of bone marrow cellularity. Poor tolerance with Imbruvica. In addition, the patient seem to have disease progression. I recommended venetoclax and Gazyva fixed treatment duration. The patient was counseled today about diagnosis, staging, prognosis, diagnostic tests, medications, side effects and disease management. The method of counseling included verbal explanation. The patient verbalized understanding. Continue Gazyva. Start venetoclax. Venetoclax 5 weeks ramp-up dose:  Cycle 1: Gazyva 100 mg IV day 1, 900 mg day 2, and 1000 mg day 8 and day 15  Cycle 2-6: Gazyva 1000 mg every 3 weeks   For a treatment duration of 12-month. Continue venetoclax 400 mg    Tumor lysis prophylaxis-continue allopurinol    Infection prophylaxis-continue acyclovir 400 mg p.o. twice daily    Treatment related toxicity- fatigue    #Right lower extremity edema-likely secondary to compressive inguinal adenopathy/Baker's cyst on the right. No signs of DVT on prior vascular study 6/18/2019. She continued to have significant leg swelling in the right lower extremity. #Hypertension-controlled. Follow-up with PCP    Plan:  Start venetoclax 400 milligrams daily  Continue cycle #3 Gazyva today  RTC 4 weeks    Return in about 4 weeks (around 3/11/2020) for Treatment Visit and see Dr. Finesse Nieves in [address] [place] [address].    Weekly CBC     I, Natalie Garcia am scribing for Wendy Shannon MD. Electronically signed by Natalie Garcia on 2/12/2020 at 5:23 PM.   I, Dr Tyrone Saavedra, personally performed the services described in this documentation as scribed by Natalie Garcia MA in my presence and is both accurate and complete. I have seen, examined and reviewed this patient medication list, appropriate labs and imaging studies. I reviewed relevant medical records and others physicians notes. I discussed the plans of care with the patient. I answered all the questions to the patients satisfaction. (Please note that portions of this note were completed with a voice recognition program. Efforts were made to edit the dictations but occasionally words are mis-transcribed.)  Over 50% of the total visit time of 25 minutes in face to face encounter with the patient, out of which more than 50% of the time was spent in counseling patient or family and coordination of care. Counseling included but was not limited to time spent reviewing labs, imaging studies/ treatment plan and answering questions.

## 2020-02-27 PROBLEM — E83.52 HYPERCALCEMIA OF MALIGNANCY: Status: ACTIVE | Noted: 2020-01-01

## 2020-02-27 PROBLEM — R13.10 DYSPHAGIA: Status: ACTIVE | Noted: 2020-01-01

## 2020-02-28 PROBLEM — E43 SEVERE MALNUTRITION (HCC): Status: ACTIVE | Noted: 2020-01-01

## 2020-02-28 NOTE — ED TRIAGE NOTES
Pt to ED with c/o bilat lower extremity swelling increased today Pt reports symptoms since August 2019

## 2020-02-28 NOTE — PROGRESS NOTES
4 Eyes Skin Assessment    Naveed Mojica is being assessed upon: Admission    I agree that Claudia Montanez, along with Kalina Dodge (either 2 RN's or 1 LPN and 1 RN) have performed a thorough Head to Toe Skin Assessment on the patient. ALL assessment sites listed below have been assessed. Areas assessed by both nurses:     [x]   Head, Face, and Ears   [x]   Shoulders, Back, and Chest  [x]   Arms, Elbows, and Hands   [x]   Coccyx, Sacrum, and Ischium  [x]   Legs, Feet, and Heels    Does the Patient have Skin Breakdown?  No    Guille Prevention initiated: Yes  Wound Care Orders initiated: NA    [facility] nurse consulted for Pressure Injury (Stage 3,4, Unstageable, DTI, NWPT, and Complex wounds) and New or Established Ostomies: NA        Primary Nurse eSignature: Nirmal Day RN on 2/28/2020 at 1:09 AM      Co-Signer eSignature: Electronically signed by Oumar Shelton RN on 2/28/20 at 1:40 AM

## 2020-02-28 NOTE — PROGRESS NOTES
Notified Jade Moura in Radiology that esophagram needs to be done this am. She states it is on list to be done this morning.   Electronically signed by Lex Helton RN on 2/28/2020 at 5:42 AM

## 2020-02-28 NOTE — CARE COORDINATION
Given this pt's readmission risk score being greater than 10% (current 13%), consider the initiation of Home Care services. If you feel appropriate, please order at discharge. Thank you.   Electronically signed by Alejandra Rollins on 2/28/2020 at 7:46 AM

## 2020-02-28 NOTE — PROGRESS NOTES
Progress Note    Date:2/28/2020       Room:0435/435-02  Patient Dawood Sahu     YOB: 1945     Age:74 y.o. Subjective   Interval History Status: improved. Patient was seen this morning in her room, denies any acute overnight event, says she is feeling a lot better. Denied any constipation some fatigue and denies any confusion. Review of Systems   Review of Systems  12 point system reviewed and negative except as stated above. Medications   Scheduled Meds:    acyclovir  400 mg Oral BID    allopurinol  100 mg Oral Daily    sodium chloride flush  10 mL Intravenous 2 times per day    lisinopril  20 mg Oral Daily    enoxaparin  40 mg Subcutaneous Daily    pantoprazole  40 mg Intravenous Daily    And    sodium chloride (PF)  10 mL Intravenous Daily     Continuous Infusions:    sodium chloride Stopped (02/28/20 0723)    sodium chloride 100 mL/hr at 02/28/20 0724     PRN Meds: sodium chloride flush, acetaminophen, acetaminophen, polyethylene glycol, ondansetron, potassium chloride **OR** potassium alternative oral replacement **OR** potassium chloride, magnesium sulfate    Past History    Past Medical History:   has a past medical history of Body mass index (bmi) 26.0-26.9, adult, Hyperlipidemia, Hypertension, Osteoporosis, and Unspecified b-cell lymphoma, 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 Social History:   reports that she quit smoking about 43 years ago. Her smoking use included cigarettes. She started smoking about 45 years ago. She has a 2.00 pack-year smoking history. She has never used smokeless tobacco. She reports that she does not drink alcohol or use drugs.      Family History:   Family History   Problem Relation Age of Onset    Lung Cancer Mother         not a smoker    Tuberculosis Father        Physical Examination      Vitals:  BP (!) 156/73   Pulse 89   Temp 97.5 °F (36.4 °C) (Temporal)   Resp 16   Ht 4' 11\" (1.499 m)   Wt 120 lb (54.4 kg)

## 2020-02-28 NOTE — ED PROVIDER NOTES
[facility]  eMERGENCY dEPARTMENT eNCOUnter      Pt Name: Didi Lazaro  MRN: [id number]  [place] 1945  Date of evaluation: 2/27/2020  Provider: BEE Sequeira    CHIEF COMPLAINT       Chief Complaint   Patient presents with    Leg Swelling     Pt to ED with c/o bilat leg swelling increased today          HISTORY OF PRESENT ILLNESS   (Location/Symptom, Timing/Onset,Context/Setting, Quality, Duration, Modifying Factors, Severity)  Note limiting factors. Didi Lazaro is a 76 y.o. female who presents to the emergency department with complaints of increased weakness and decreased appetite. Patient has SLL and is followed by Dr. Hao Valdovinos. Patient states that she had lab work done today and her calcium level is high. She states the doctor's office told her to come into the emergency department for evaluation. She states she has had trouble getting around due to increased weakness. She has right leg swelling that she portably has had since August.  According to the records she has had a venous Doppler ultrasound of the leg that showed no clot but enlarged lymph nodes. She also has had a CT of the chest which was negative for metastatic disease. Patient states her increased weakness and decreased appetite symptoms started after she started her venetoclax. She also relates that she get choked on foods when eating. She denies any hx of reflux. According to record Calcium today was 14.6    HPI    NursingNotes were reviewed. REVIEW OF SYSTEMS    (2-9 systems for level 4, 10 or more for level 5)     Review of Systems   Constitutional: Negative for activity change, appetite change, fatigue, fever and unexpected weight change. HENT: Negative for congestion, hearing loss, rhinorrhea, sinus pressure, sore throat and trouble swallowing. Eyes: Negative for visual disturbance. Respiratory: Negative for cough and shortness of breath.     Cardiovascular: Positive for leg swelling (right). Negative for chest pain and palpitations. Gastrointestinal: Negative for abdominal pain, constipation, diarrhea, nausea and vomiting. Endocrine: Negative for cold intolerance and heat intolerance. Genitourinary: Negative for flank pain, menstrual problem, pelvic pain, urgency and vaginal discharge. Musculoskeletal: Negative for arthralgias. Skin: Negative for rash. Neurological: Positive for weakness. Negative for headaches. Psychiatric/Behavioral: Negative for dysphoric mood and sleep disturbance. The patient is not nervous/anxious. A complete review of systems was performed and is negative except as noted above in the HPI.        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 [facility])          SURGICAL HISTORY       Past Surgical History:   Procedure Laterality Date    CHOLECYSTECTOMY      COLONOSCOPY  01/26/2009    Dr Nam Grijalva, TOTAL ABDOMINAL      LYMPH NODE BIOPSY Right 3/4/2019    EXCISION OF SUPERFICIAL LYMPH NODE, RIGHT GROIN performed by Grady Guerrero DO at [address]       Previous Medications    ACETAMINOPHEN (TYLENOL) 500 MG TABLET    Take 2 tablets by mouth once for 1 dose    ACYCLOVIR (ZOVIRAX) 200 MG CAPSULE    Take 400 mg by mouth 2 times daily    ALENDRONATE (FOSAMAX) 70 MG TABLET    Take 1 tablet by mouth every 7 days    ALLOPURINOL (ZYLOPRIM) 100 MG TABLET    Take 100 mg by mouth daily    CALCITONIN (MIACALCIN) 200 UNIT/ACT NASAL SPRAY    USE 1 SPRAY(S) INTRANASALLY ONCE DAILY    LISINOPRIL-HYDROCHLOROTHIAZIDE (PRINZIDE;ZESTORETIC) 20-12.5 MG PER TABLET    TAKE 1 TABLET BY MOUTH ONCE DAILY    VENETOCLAX 10 & 50 & 100 MG TBPK    Take 400 mg by mouth daily Started 1/15/20-starter pack        ALLERGIES     Patient has no known Normocephalic and atraumatic. Mouth/Throat:      Mouth: Mucous membranes are moist.      Pharynx: Oropharynx is clear. Eyes:      Conjunctiva/sclera: Conjunctivae normal.   Neck:      Musculoskeletal: Normal range of motion and neck supple. Cardiovascular:      Rate and Rhythm: Normal rate and regular rhythm. Pulses: Normal pulses. Heart sounds: Normal heart sounds. Pulmonary:      Effort: Pulmonary effort is normal.      Breath sounds: Normal breath sounds. Abdominal:      General: Bowel sounds are normal. There is no distension. Palpations: Abdomen is soft. Tenderness: There is no abdominal tenderness. There is no guarding. Musculoskeletal:      Right lower leg: Edema (3+) present. Lymphadenopathy:      Lower Body: Right inguinal adenopathy present. Skin:     General: Skin is warm. Neurological:      Mental Status: She is alert and oriented to person, place, and time.          DIAGNOSTIC RESULTS     EKG: All EKG's are interpreted by the Emergency Department Physician who either signs or Co-signs this chart in the absence of a cardiologist.        RADIOLOGY:   Non-plain film images such as CT, Ultrasound and MRI are read by the radiologist. [place] Roopa images are visualized and preliminarily interpreted by the emergency physician with the below findings:        Interpretation per the Radiologist below, if available at the time of this note:    FL ESOPHAGRAM    (Results Pending)         ED BEDSIDE ULTRASOUND:   Performed by ED Physician - none    LABS:  Labs Reviewed   CBC WITH AUTO DIFFERENTIAL - Abnormal; Notable for the following components:       Result Value    RDW 15.7 (*)     Lymphocytes % 15.1 (*)     Monocytes % 19.6 (*)     Lymphocytes Absolute 0.8 (*)     Monocytes Absolute 1.00 (*)     All other components within normal limits   COMPREHENSIVE METABOLIC PANEL W/ REFLEX TO MG FOR LOW K - Abnormal; Notable for the following components:    Sodium 129 (*) Potassium reflex Magnesium 3.0 (*)     Chloride 86 (*)     Glucose 116 (*)     Calcium 13.9 (*)     Total Protein 6.5 (*)     Alkaline Phosphatase 239 (*)     AST 55 (*)     All other components within normal limits    Narrative:     CALL  Gavin  K[facility] tel. ,  Chemistry results called to and read back by Mckay/RN/ER, 02/27/2020 19:43,  by [facility]  Chemistry results called to and read back by Mckay/RN/ER, 02/27/2020 19:42,  by [facility]   PTH, INTACT - Abnormal; Notable for the following components:    PTH 9.5 (*)     All other components within normal limits   MAGNESIUM - Abnormal; Notable for the following components:    Magnesium 1.4 (*)     All other components within normal limits   BRAIN NATRIURETIC PEPTIDE   VITAMIN D 1,25 DIHYDROXY   VITAMIN D 25 HYDROXY   BASIC METABOLIC PANEL   MAGNESIUM   CBC       All other labs were within normal range or not returned as of this dictation. EMERGENCY DEPARTMENT COURSE and DIFFERENTIALDIAGNOSIS/MDM:   Vitals:    Vitals:    02/27/20 1828   BP: (!) 145/88   Pulse: 120   Resp: 20   Temp: 98.2 °F (36.8 °C)   SpO2: 96%   Weight: 120 lb (54.4 kg)   Height: 4' 11\" (1.499 m)       MDM  Number of Diagnoses or Management Options  Hypercalcemia:   Hypokalemia:   Diagnosis management comments: 2005 - Spoke with Dr Gutierrez Monge who wants pt admitted to hospitalist.     2045 - spoke with Dr Rachel Peres who is willing to admit. Amount and/or Complexity of Data Reviewed  Clinical lab tests: ordered and reviewed  Discuss the patient with other providers: yes    Patient Progress  Patient progress: stable        CONSULTS:  None    PROCEDURES:  Unless otherwise notedbelow, none     Procedures    FINAL IMPRESSION     1. Hypercalcemia    2.  Hypokalemia          DISPOSITION/PLAN   DISPOSITION        PATIENT REFERRED TO:  @FUP@    DISCHARGE MEDICATIONS:  New Prescriptions    No medications on file          (Please note that portions of this note were completed with a voice recognition program.  Efforts were made to edit the dictations butoccasionally words are mis-transcribed.)    BEE Morales (electronically signed)  [address] [facility], [facility]  02/27/20 5837

## 2020-02-28 NOTE — H&P
[facility] Hospitalists  Hospitalist - History & Physical      PCP: Arnold Slaughter MD    Date of Admission: 2/27/2020    Date of Service: 2/27/2020    Chief Complaint:  Hypercalcemia     History Of Present Illness: The patient is a 76 y.o. female with PMH  Stage IV small lymphocytic lymphoma followed by Dr. Kenny Ornelas who presented to [address] [place] [address] ED complaining of increased weakness and decreased appetite and was told her calcium was elevated on labs done earlier in the day. She has bulky right inguinal adenopathy which has caused chronic right leg edema. Has undergone venous doppler that was negative for DVT. She states she has had progressive weight loss and dysphagia. Dr. Kenny Ornelas has seen in consultation and recommended Zometa 4 mg IV and continuation of IV fluids. Esophagram ordered for tomorrow morning as well. She describes reflux symptoms described as esophageal burning radiating to epigastric area, worse when lying flat on her back or after swallowing. Denies chest pain, heart palpitations, dyspnea. Describes profound weakness. 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  Authorizing Provider   allopurinol (ZYLOPRIM) 100 MG tablet Take 100 mg by mouth daily   Yes Historical Provider, MD   acyclovir (ZOVIRAX) 200 MG capsule Take 400 mg by mouth 2 times daily   Yes Historical Provider, MD   Venetoclax 10 & 50 & 100 MG TBPK Take 400 mg by mouth daily Started 1/15/20-starter pack    Yes Historical Provider, MD   lisinopril-hydrochlorothiazide (PRINZIDE;ZESTORETIC) 20-12.5 MG per tablet TAKE 1 TABLET BY MOUTH ONCE DAILY 12/12/19  Yes Shelli Barrientos MD   alendronate (FOSAMAX) 70 MG tablet Take 1 tablet by mouth every 7 days  Patient taking differently: Take 70 mg by mouth every 7 days Tuesday 12/12/19  Yes Shelli Barrientos MD   calcitonin (MIACALCIN) 200 UNIT/ACT nasal spray USE 1 SPRAY(S) INTRANASALLY ONCE DAILY 12/12/19  Yes Shelli Barrientos MD   acetaminophen (TYLENOL) 500 MG tablet Take 2 tablets by mouth once for 1 dose 12/4/19 2/27/20 Yes Vanna Parmar MD     Allergies:    Patient has no known allergies. Social History:    The patient currently lives at home. Tobacco:   reports that she quit smoking about 43 years ago. Her smoking use included cigarettes. She started smoking about 45 years ago. She has a 2.00 pack-year smoking history. She has never used smokeless tobacco.  Alcohol:   reports no history of alcohol use.   Illicit Drugs: denies    Family History:      Problem Relation Age of Onset    Lung Cancer Mother         not a smoker    Tuberculosis Father      Review of Systems:   Constitutional / general:  Denies fever / chills / sweats +fatigue +weight loss  Head:  Denies headache / neck stiffness / trauma / visual change  Eyes:  Denies blurry vision / acute visual change or loss / itching / redness  ENT: Denies sore throat / hoarseness / nasal drainage / ear pain  CV:  Denies chest pain / palpitations/ orthopnea   Respiratory:  Denies cough / shortness of breath / sputum / hemoptysis  GI: Denies nausea / vomiting / abdominal pain / diarrhea / constipation +heartburn +dysphagia  :  Denies dysuria / hesitancy / urgency / hematuria   Neuro: Denies paralysis / syncope / seizure / dysphagia / headache / paresthesias  Musculoskeletal:  +muscle weakness /+joint stiffness / +pain  Vascular:+rle edema / claudication / varicosities  Heme / endocrine: Denies

## 2020-02-28 NOTE — CONSULTS
MEDICAL ONCOLOGY CONSULTATION    Pt Name: Stuart Washnigton  MRN: [id number]  YOB: 1945  Date of evaluation: 2/27/2020    REASON FOR CONSULTATION: Continue with of care-history of CLL and hypercalcemia  REQUESTING PHYSICIAN: Hospitalist    History Obtained From:    patient, electronic medical record    HISTORY OF PRESENT ILLNESS:      The patient is a pleasant 76years old female who has a diagnosis of small lymphocytic lymphoma diagnosed in December 2018. She has a stage IV disease with bone marrow involvement. She had a bulky right inguinal adenopathy. She was started on treatment with Imbruvica in May 2019 through this November 2019 with progressive disease. She was subsequently recommended Gazyva and venetoclax which was started in December 2019. Again the patient reports no major response. She continues to have right lower extremity edema. She reports no improvement of her right inguinal adenopathy. She was sent to the emergency today due to a critical lab value of calcium 14.6. Calcium repeated in the emergency 13.9. PTH was low. She is receiving IV fluids. She also complains of progressive dysphagia and weight loss. Diagnosis   · Small lymphocytic lymphoma, December 2018   · Stage IV (bone marrow involvement)   · 13q deletion  · Baker's cyst on the right  Treatment summary  · May through November 2019-Imbruvica with progressive disease  · 12/3/2019-Gazyva/venetoclax  Interval history  The patient has a diagnosis of SLL with 13 q. deletion. She had a very high burden of disease with significant inguinal bilateral adenopathy. She has been started on treatment with Gazyva and venetoclax. She presents today for initiation of cycle #3 of Gazyva. She is doing well otherwise. She denies any nausea vomiting. She denies any diarrhea. She still has significant right lower extremity edema. She has not noticed any major improvement in the right inguinal adenopathy.   She also has mild left current or ex partner: Not on file     Emotionally abused: Not on file     Physically abused: Not on file     Forced sexual activity: Not on file   Other Topics Concern    Not on file   Social History Narrative    Not on file       Family History:   Family History   Problem Relation Age of Onset    Lung Cancer Mother         not a smoker    Tuberculosis Father        Current Hospital Medications:    Current Facility-Administered Medications: 0.9 % sodium chloride infusion, 1,000 mL, Intravenous, Continuous    Allergies: No Known Allergies      Subjective   REVIEW OF SYSTEMS:   CONSTITUTIONAL: no fever, no night sweats, fatigue; weight loss  HEENT: no blurring of vision, no double vision, no hearing difficulty, no tinnitus, no ulceration, no dysplasia, no epistaxis;  LUNGS: no cough, no hemoptysis, no wheeze,  no shortness of breath;  CARDIOVASCULAR: no palpitation, no chest pain, no shortness of breath;  GI: Progressive dysphagia, no abdominal pain, no nausea, no vomiting, no diarrhea, no constipation;  GABE: no dysuria, no hematuria, no frequency or urgency, no nephrolithiasis;  MUSCULOSKELETAL: Right lower extremity edema, no joint pain, no swelling, no stiffness;  ENDOCRINE: no polyuria, no polydipsia, no cold or heat intolerance;  HEMATOLOGY: no easy bruising or bleeding, no history of clotting disorder;  DERMATOLOGY: no skin rash, no eczema, no pruritus;  PSYCHIATRY: no depression, no anxiety, no panic attacks, no suicidal ideation, no homicidal ideation;  NEUROLOGY: no syncope, no seizures, no numbness or tingling of hands, no numbness or tingling of feet, no paresis;    Objective   BP (!) 145/88   Pulse 120   Temp 98.2 °F (36.8 °C)   Resp 20   Ht 4' 11\" (1.499 m)   Wt 120 lb (54.4 kg)   LMP  (LMP Unknown)   SpO2 96%   BMI 24.24 kg/m²     PHYSICAL EXAM:  CONSTITUTIONAL: Alert, appropriate, no acute distress, ill-appearing, cachectic  EYES: Non icteric, EOM intact, pupils equal round   ENT: Mucus

## 2020-02-28 NOTE — PROGRESS NOTES
Nutrition Assessment    Type and Reason for Visit: Initial, Positive Nutrition Screen    Nutrition Assessment: Pt is severely malnourished AEB wt loss with fat and muscle loss past 2 months d/t swallowing difficulty. Will follow for nutrition progression and implement nutritional intervention as needed. Malnutrition Assessment:  · Malnutrition Status: Meets the criteria for severe malnutrition  · Context: Acute illness or injury  · Findings of the 6 clinical characteristics of malnutrition (Minimum of 2 out of 6 clinical characteristics is required to make the diagnosis of moderate or severe Protein Calorie Malnutrition based on AND/ASPEN Guidelines):  1. Energy Intake-Less than or equal to 75% of estimated energy requirement, Greater than or equal to 1 month    2. Weight Loss-5% loss or greater, in 3 months  3. Fat Loss-Moderate subcutaneous fat loss, Orbital  4. Muscle Loss-Moderate muscle mass loss, Clavicles (pectoralis and deltoids), Temples (temporalis muscle)  5. Fluid Accumulation-Mild fluid accumulation, Extremities  6.   Strength-     Nutrition Risk Level: High    Nutrient Needs:  · Estimated Daily Total Kcal: 0108-3246(52-32 kcal/kg)  · Estimated Daily Protein (g): 65-82(1.2-1.5 g/kg)  · Estimated Daily Total Fluid (ml/day): 7616-4271    Nutrition Diagnosis:   · Problem: Severe malnutrition, In context of acute illness or injury  · Etiology: related to Insufficient energy/nutrient consumption     Signs and symptoms:  as evidenced by Diet history of poor intake, Moderate loss of subcutaneous fat, Moderate muscle loss    Objective Information:  · Current Nutrition Therapies:  · Oral Diet Orders: NPO   · Anthropometric Measures:  · Ht: 4' 11\" (149.9 cm)   · Admission Body Wt: 120 lb (54.4 kg)  · Usual Body Wt: 127 lb (57.6 kg)  · Ideal Body Wt: 98 lb (44.5 kg),   · BMI Classification: BMI 18.5 - 24.9 Normal Weight    Nutrition Interventions:   Continue NPO  Continued Inpatient Monitoring    Nutrition Evaluation:   · Evaluation: Goals set   · Goals: Pt will tolerate nutrition progression with po 50% or more, wt stable.     · Monitoring: Nutrition Progression, Weight, Pertinent Labs      Electronically signed by Edwin Corral MS, RD, LD on 2/28/20 at 1:28 PM    Contact Number: [phone]

## 2020-02-28 NOTE — PROGRESS NOTES
PROGRESS NOTE    Pt Name: Didi Lazaro  MRN: [id number]  YOB: 1945  Date of evaluation: 2/28/2020    Subjective Feeling better this morning. 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 She has a stage IV disease with bone marrow involvement. She had a bulky right inguinal adenopathy. She was started on treatment with Imbruvica in May 2019 through this November 2019 with progressive disease. She was subsequently recommended Gazyva and venetoclax which was started in December 2019. Again the patient reports no major response. She continues to have right lower extremity edema. She reports no improvement of her right inguinal adenopathy. She was sent to the emergency today due to a critical lab value of calcium 14.6. Calcium repeated in the emergency 13.9. PTH was low. She is receiving IV fluids.   She also complains of progressive dysphagia and weight loss.     Diagnosis   · Small lymphocytic lymphoma, December 2018   · Stage IV (bone marrow involvement)   · 13q deletion  · Baker's cyst on the right  Treatment summary  · May through November 2019-Imbruvica with progressive disease  · 12/3/2019-Gazyva/venetoclax  Interval history  The patient has a diagnosis of SLL with 13 q. deletion.  She had a very high burden of disease with significant inguinal bilateral adenopathy.  She has been started on treatment with Gazyva and venetoclax.  She presents today for initiation of cycle #3 of Twylla Golas.  She is doing well otherwise.  She denies any nausea vomiting.  She denies any diarrhea.  She still has significant right lower extremity edema.  She has not noticed any major improvement in the right inguinal adenopathy.  She also has mild left inguinal adenopathy.  She is going to start venetoclax 400 mg today.     Hematology history  Ms Bashir Otto was first seen by me on 3/27/2019 referred by  biopsy. #Hypokalemia/hypomagnesemia-  #Right lower extremity edema-secondary to bulky adenopathy right groin  #Dysphagia-new onset dysphasia with complaints of weight loss. We will order esophagram for tomorrow. If abnormal consult GI. #Weight loss  #Mild hyponatremia     PLAN:  Continue IV fluids however procedure  Magnesium replacement  Continue potassium replacement  Follow-up 1, 25 hydroxy vitamin D   Will arrange for a repeat CT chest abdomen pelvis and repeat roshan biopsy. I have seen, examined and reviewed this patient medication list, appropriate labs and imaging studies. I reviewed relevant medical records and others physicians notes. I discussed the plans of care with the patient. I answered all the questions to the patients satisfaction.     (Please note that portions of this note were completed with a voice recognition program. Efforts were made to edit the dictations but occasionally words are mis-transcribed.)    Ian Caceres MD    02/28/20  6:41 AM

## 2020-02-29 NOTE — PROGRESS NOTES
kg)   LMP  (LMP Unknown)   SpO2 94%   BMI 24.24 kg/m²   Temp (24hrs), Av.9 °F (36.6 °C), Min:97.5 °F (36.4 °C), Max:98.3 °F (36.8 °C)      I/O (24Hr): Intake/Output Summary (Last 24 hours) at 2020 1339  Last data filed at 2020 1101  Gross per 24 hour   Intake 1472 ml   Output --   Net 1472 ml       Physical Exam  General: Cachectic, no acute distress lying comfortably in bed. HEENT: Atraumatic normocephalic  Cardiac: Normal S1-S2 no murmurs rub or gallop. Respiratory: clear To auscultation bilaterally, no rhonchi or rales  Abdomen: nontender to palpation, no organomegaly noted. : right huge inguinal LN  Extremities: no tenderness, no edema, moves all extremities  Psych: Affect normal and good eye contact        Labs/Imaging/Diagnostics   Labs:  CBC:  Recent Labs     20   WBC 5.3 4.4*   RBC 4.67 4.06*   HGB 12.9 11.3*   HCT 38.8 33.9*   MCV 83.1 83.5   RDW 15.7* 15.7*    105*     CHEMISTRIES:  Recent Labs     202 20  0808   * 134* 139   K 3.0* 3.3* 3.3*   CL 86* 95* 103   CO2    BUN 16 13 13   CREATININE 0.6 0.6 0.5   GLUCOSE 116* 106 69*   MG 1.4* 2.0  --      PT/INR:No results for input(s): PROTIME, INR in the last 72 hours. APTT:No results for input(s): APTT in the last 72 hours. LIVER PROFILE:  Recent Labs     20   AST 55*   ALT 30   BILITOT 0.6   ALKPHOS 239*       Imaging Last 24 Hours:  Fl Esophagram    Result Date: 2020  Exam: Fluoroscopic single contrast esophagram, 2020 Fluoroscopy time: 2.5 minutes Fluoroscopic dose 0.556 mGym2 Indication: Dysphasia Findings: The primary peristaltic wave traverses the entire esophagus with prompt clearance of contrast. No secondary or tertiary contractions. No persistent esophageal filling defect, stricturing, outpouching. The gastroesophageal junction is anatomically located.  A small amount of gastroesophageal reflux is elicited with Valsalva

## 2020-02-29 NOTE — PROGRESS NOTES
PROGRESS NOTE    Pt Name: Amrita Trinh  MRN: [id number]  YOB: 1945  Date of evaluation: 2/29/2020    Subjective Feeling better this morning. Tolerated breakfast (Tajik toast and lopez) without difficulty. Has up and ambulated to restroom and had a bowel movement. 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 She has a stage IV disease with bone marrow involvement. She had a bulky right inguinal adenopathy. She was started on treatment with Imbruvica in May 2019 through this November 2019 with progressive disease. She was subsequently recommended Gazyva and venetoclax which was started in December 2019. Again the patient reports no major response. She continues to have right lower extremity edema. She reports no improvement of her right inguinal adenopathy. She was sent to the emergency today due to a critical lab value of calcium 14.6. Calcium repeated in the emergency 13.9. PTH was low. She is receiving IV fluids.   She also complains of progressive dysphagia and weight loss.     Diagnosis   · Small lymphocytic lymphoma, December 2018   · Stage IV (bone marrow involvement)   · 13q deletion  · Baker's cyst on the right  Treatment summary  · May through November 2019-Imbruvica with progressive disease  · 12/3/2019-Gazyva/venetoclax  Interval history  The patient has a diagnosis of SLL with 13 q. deletion.  She had a very high burden of disease with significant inguinal bilateral adenopathy.  She has been started on treatment with Gazyva and venetoclax.  She presents today for initiation of cycle #3 of Gazyva.  She is doing well otherwise.  She denies any nausea vomiting.  She denies any diarrhea.  She still has significant right lower extremity edema.  She has not noticed any major improvement in the right inguinal adenopathy.  She also has mild left inguinal adenopathy. Gissell Hazard is going to start venetoclax 400 mg today.     Hematology history  Ms Johnson Barrett was first seen by me on 3/27/2019 referred by Dr. Anil Bill for a diagnosis of B-cell lymphoma. The patient had a history of a right inguinal adenopathy that was treated with antibiotic without resolution. Therefore a biopsy was performed. · 12/13/2018-CT abdomen pelvis showed a moderately prominent right inguinal lymph node. In addition, an oval solid nodule in the left labium/left vagina wall measures 2.5 x 1.6 cm. · 2/20/2019-the patient was seen by Dr. Didi Shannon, who requested an ultrasound of the pelvis. · 2/28/2019-US pelvis showed a hypoechoic 2.3 x 2.1 x 2.1 cm area within the vagina concerning for Bartholin's cyst.   · 2/28/2019-she was seen by Dr. Valery Estrada surgery with complains of enlarging right inguinal adenopathy, and therefore was recommended FNA biopsy   · 3/4/2019-FNA biopsy consistent B-cell lymphoma on morphology and IHC (positive for CD3, CD5 and CD20 and negative for BCL-2, BCL 6, cyclin D1, CD30 and MUM-1. Extensive necrosis, and therefore, further characterization was not possible. · 3/27/2019-she was first seen by me. · 4/3/2019-bone marrow biopsy/aspirate showed involvement by a cyclin D1 negative, CD5/CD23 positive monoclonal B-cell population consistent with involvement of bone marrow by B-cell SLL (10%). CD20 positive. Normal female karyotype 55 XX. FISH cytogenetics 13 q deletion   · 4/8/2019-CT chest showed no evidence of mediastinal, hilar adenopathy. · 4/9/2019-PET scan showed FDG avid enlarged right pelvic and inguinal lymph nodes. Suggestive for active lymphoma. Specifically, a right obturator lymph node with SUV 23. Right external iliac lymph node with SUV 27. Right inguinal lymph node with SUV 11.  · May through November 2019-Imbruvica with progressive disease or poor tolerance.   · 12/3/2019- venetoclax/Gazyva fixed duration treatment 1 year expected.         REVIEW OF medical conditions outlined above. Further outpatient work-up and management including right inguinal lymph node excisional biopsy to be performed as an outpatient as arranged by Dr. Hemant Salvador.      Electronically signed by Katelyn Edwards MD on 2/29/20 at 11:35 AM

## 2020-03-01 NOTE — PROGRESS NOTES
first seen by me on 3/27/2019 referred by Dr. Avani Levi for a diagnosis of B-cell lymphoma. The patient had a history of a right inguinal adenopathy that was treated with antibiotic without resolution. Therefore a biopsy was performed. · 12/13/2018-CT abdomen pelvis showed a moderately prominent right inguinal lymph node. In addition, an oval solid nodule in the left labium/left vagina wall measures 2.5 x 1.6 cm. · 2/20/2019-the patient was seen by Dr. Everett Wheat, who requested an ultrasound of the pelvis. · 2/28/2019-US pelvis showed a hypoechoic 2.3 x 2.1 x 2.1 cm area within the vagina concerning for Bartholin's cyst.   · 2/28/2019-she was seen by Dr. Artemio Santamaria surgery with complains of enlarging right inguinal adenopathy, and therefore was recommended FNA biopsy   · 3/4/2019-FNA biopsy consistent B-cell lymphoma on morphology and IHC (positive for CD3, CD5 and CD20 and negative for BCL-2, BCL 6, cyclin D1, CD30 and MUM-1. Extensive necrosis, and therefore, further characterization was not possible. · 3/27/2019-she was first seen by me. · 4/3/2019-bone marrow biopsy/aspirate showed involvement by a cyclin D1 negative, CD5/CD23 positive monoclonal B-cell population consistent with involvement of bone marrow by B-cell SLL (10%). CD20 positive. Normal female karyotype 55 XX. FISH cytogenetics 13 q deletion   · 4/8/2019-CT chest showed no evidence of mediastinal, hilar adenopathy. · 4/9/2019-PET scan showed FDG avid enlarged right pelvic and inguinal lymph nodes. Suggestive for active lymphoma. Specifically, a right obturator lymph node with SUV 23. Right external iliac lymph node with SUV 27. Right inguinal lymph node with SUV 11.  · May through November 2019-Imbruvica with progressive disease or poor tolerance.   · 12/3/2019- venetoclax/Gazyva fixed duration treatment 1 year expected.         REVIEW OF SYSTEMS:   CONSTITUTIONAL: no fever, no night sweats, fatigue; weight loss  HEENT: no blurring of vision, no double vision, no hearing difficulty, no tinnitus, no ulceration, no dysplasia, no epistaxis;  LUNGS: no cough, no hemoptysis, no wheeze,  no shortness of breath;  CARDIOVASCULAR: no palpitation, no chest pain, no shortness of breath;  GI: Progressive dysphagia no abdominal pain, no nausea, no vomiting, no diarrhea, no constipation;  GABE: no dysuria, no hematuria, no frequency or urgency, no nephrolithiasis;  MUSCULOSKELETAL: Right lower extremity edema no joint pain, no swelling, no stiffness;  ENDOCRINE: no polyuria, no polydipsia, no cold or heat intolerance;  HEMATOLOGY: no easy bruising or bleeding, no history of clotting disorder;  DERMATOLOGY: no skin rash, no eczema, no pruritus;  PSYCHIATRY: no depression, no anxiety, no panic attacks, no suicidal ideation, no homicidal ideation;  NEUROLOGY: no syncope, no seizures, no numbness or tingling of hands, no numbness or tingling of feet, no paresis;    Objective   /67   Pulse 83   Temp 98.3 °F (36.8 °C) (Temporal)   Resp 14   Ht 4' 11\" (1.499 m)   Wt 120 lb (54.4 kg)   LMP  (LMP Unknown)   SpO2 96%   BMI 24.24 kg/m²     PHYSICAL EXAM:  CONSTITUTIONAL: Alert, appropriate, no acute distress, chronically ill-appearing, cachectic  EYES: Non icteric, EOM intact, pupils equal round   ENT: Mucus membranes moist, no oral pharyngeal lesions, external inspection of ears and nose are normal  NECK: Supple, no masses. No palpable thyroid mass  CHEST/LUNGS: CTA bilaterally, normal respiratory effort   CARDIOVASCULAR: RRR, no murmurs. No lower extremity edema  ABDOMEN: soft non-tender, active bowel sounds, no HSM. No palpable masses  EXTREMITIES: warm, full ROM in all 4 extremities, no focal weakness. SKIN: warm, dry with no rashes or lesions  LYMPH: No cervical, clavicular, axillary, or inguinal lymphadenopathy  NEUROLOGIC: follows commands, non focal   PSYCH: mood and affect appropriate.   Alert and oriented to time, place, will plan to repeat a biopsy. #Hypokalemia/hypomagnesemia-managed by attending  Magnesium 2.0 on 2/28/2020  Potassium 3.1 today 3/1/2020-potassium replacement protocol in process    #Right lower extremity edema-secondary to bulky adenopathy right groin    #Dysphagia-new onset dysphasia with complaints of weight loss. Esophageal Stevensyeda Weiss on 2/28/2020 documented no stricture, filling defect or outpouching. Mild gastroesophageal reflux. #Weight loss    #Mild hyponatremia-  Sodium 132 today 3/1/2020     PLAN:  Continue IV fluids   Will arrange for a repeat CT chest abdomen pelvis and repeat roshan biopsy as outpatient. Encouraged increasing activity as tolerated    BEE Lambert    03/01/20  8:37 AM     Physician's attestation and contribution:  I, Dr Rosa Enriquez, personally and independently performed an evaluation on  Amalia Viera        I have reviewed relevant medical information/data to include but not limited to the medication list, relevant appropriate lab work and imaging when applicable. I reviewed other physician's notes, ancillary services and nurses assessments. I have reviewed the above documentation completed by Jamarcus GAMBOA   Please see my additional addended and/or modified contributions to the history of present illness, physical examination, and assessment/medical decision-making and plan that reflects my findings and impressions. I discussed essential elements of the care plan with Ramona GAMBOA and the patient. I have encouraged and answered all the questions raised to the patient's understanding and satisfaction. I concur with the above stated. Subjective-improving symptomatically, ate all of her breakfast.  Feels better    Objective-  no acute changes no acute manifestations    Assessment/plan:  Continuing IV fluids, increasing activity, physical therapy to evaluate.   Dr. Jose Miguel Au will be arranging for outpatient CT scans of the chest abdomen and pelvis and an

## 2020-03-01 NOTE — PROGRESS NOTES
Physical Therapy    Facility/Department: [facility] ONCOLOGY UNIT  Initial Assessment    NAME: Negra Price  : 1945  MRN: [id number]    Date of Service: 3/1/2020    Discharge Recommendations:  24 hour supervision or assist, Home with assist PRN        Assessment   Assessment: Pt. tolerated amb. well and is safe to d/c home with family A. Pt. reports that her daughter will be able to A for a few days. Pt. issued gait belt and advised to use it. D/C PT due to pt. getting ready to d/c home and no skilled therapy needs at this time due to high level of functional mobility. Treatment Diagnosis: SBA/CGA mobility and gait  Prognosis: Good  Decision Making: Low Complexity  PT Education: PT Role  Barriers to Learning: none noted  REQUIRES PT FOLLOW UP: No  Activity Tolerance  Activity Tolerance: Patient Tolerated treatment well       Patient Diagnosis(es): The primary encounter diagnosis was Hypercalcemia. A diagnosis of Hypokalemia was also pertinent to this visit. has a past medical history of Body mass index (bmi) 26.0-26.9, adult, Hyperlipidemia, Hypertension, Osteoporosis, and Unspecified b-cell lymphoma, lymph nodes of inguinal region and lower limb ([facility] [address] has a past surgical history that includes Hysterectomy, total abdominal; Colonoscopy (2009); Cholecystectomy; and lymph node biopsy (Right, 3/4/2019).     Restrictions  Restrictions/Precautions  Restrictions/Precautions: General Precautions  Required Braces or Orthoses?: No  Vision/Hearing  Vision: Within Functional Limits  Hearing: Within functional limits     Subjective  General  Chart Reviewed: Yes  Patient assessed for rehabilitation services?: Yes  Additional Pertinent Hx: leg swelling  Response To Previous Treatment: Not applicable  Family / Caregiver Present: Yes(spouse and pt's daughter present)  Referring Practitioner: Dr. Jason Zarate  Referral Date : 20  Diagnosis: hypercalemia, hypokalemia  Follows Commands: Within Functional Limits  General Comment  Comments: RNLonny PT. Subjective  Subjective: Pt. willing to work with therapy, but wants to go home today.   Pain Assessment  Pain Assessment: 0-10  Pain Level: 0  Non-Pharmaceutical Pain Intervention(s): Ambulation/Increased Activity;Repositioned  Response to Pain Intervention: Patient Satisfied  Pre Treatment Pain Screening  Intervention List: Patient able to continue with treatment    Orientation  Orientation  Overall Orientation Status: Within Functional Limits  Social/Functional History  Social/Functional History  Lives With: Spouse  Type of Home: House  Home Layout: One level  Home Access: Stairs to enter with rails  Bathroom Shower/Tub: Tub/Shower unit  Bathroom Equipment: Shower chair  Home Equipment: Rolling walker  Receives Help From: Family  ADL Assistance: Independent  Ambulation Assistance: Independent  Transfer Assistance: Independent  Occupation: Retired  Cognition   Cognition  Overall Cognitive Status: WFL    Objective     Observation/Palpation  Posture: Good  Observation: IV    AROM RLE (degrees)  RLE AROM: WFL  AROM LLE (degrees)  LLE AROM : WFL  Strength RLE  Strength RLE: WFL  Comment: 5/5  Strength LLE  Strength LLE: WFL  Comment: 5/5        Bed mobility  Supine to Sit: Unable to assess  Sit to Supine: Unable to assess  Comment: up in bathroom  Transfers  Sit to Stand: Supervision(pt. able to get self up from low toilet using grab bar on wall)  Stand to sit: Supervision  Comment: pt. stood at sink x 1 min to wash and dry hands SBA  Ambulation  Ambulation?: Yes  WB Status: no restriction  Ambulation 1  Surface: level tile  Device: No Device  Assistance: Contact guard assistance  Quality of Gait: steady  Gait Deviations: Slow Radha  Distance: 150'  Comments: pt. pushed IV pole     Balance  Posture: Good  Sitting - Static: Good;+  Sitting - Dynamic: Good;-  Standing - Static: Fair;+  Standing - Dynamic: Fair;+        Plan   Plan  Times per week: eval

## 2020-03-01 NOTE — PLAN OF CARE
Problem: Falls - Risk of:  Goal: Will remain free from falls  Description  Will remain free from falls  3/1/2020 0938 by Bladimir Enriquez RN  Outcome: Ongoing  3/1/2020 0126 by Zulay Salazar RN  Outcome: Ongoing  Goal: Absence of physical injury  Description  Absence of physical injury  3/1/2020 0938 by Bladimir Enriquez RN  Outcome: Ongoing  3/1/2020 0126 by Zulay Salazar RN  Outcome: Ongoing     Problem: Risk for Impaired Skin Integrity  Goal: Tissue integrity - skin and mucous membranes  Description  Structural intactness and normal physiological function of skin and  mucous membranes. 3/1/2020 9602 by Bladimir Enriquez RN  Outcome: Ongoing  3/1/2020 0126 by Zulay Salazar RN  Outcome: Ongoing     Problem: Pain:  Goal: Pain level will decrease  Description  Pain level will decrease  3/1/2020 0938 by Bladimir Enriquez RN  Outcome: Ongoing  3/1/2020 0126 by Zulay Salazar RN  Outcome: Ongoing  Goal: Control of acute pain  Description  Control of acute pain  3/1/2020 0938 by Bladimir Enriquez RN  Outcome: Ongoing  3/1/2020 0126 by Zulay Salazar RN  Outcome: Ongoing  Goal: Control of chronic pain  Description  Control of chronic pain  3/1/2020 0938 by Bladimir Enriquez RN  Outcome: Ongoing  3/1/2020 0126 by Zulay Salazar RN  Outcome: Ongoing     Problem: Nutrition  Goal: Optimal nutrition therapy  Description  Nutrition Problem: Severe malnutrition, In context of acute illness or injury  Intervention: Food and/or Nutrient Delivery: Continue NPO  Nutritional Goals: Pt will tolerate nutrition progression with po 50% or more, wt stable.      3/1/2020 8998 by Bladimir Enriquez RN  Outcome: Ongoing  3/1/2020 0126 by Zulay Salazar RN  Outcome: Ongoing     Problem: Fluid Volume:  Goal: Ability to achieve a balanced intake and output will improve  Description  Ability to achieve a balanced intake and output will improve  3/1/2020 0938 by Bladimir Enriquez RN  Outcome: Ongoing  3/1/2020 0126 by Navi Madrid
Problem: Falls - Risk of:  Goal: Will remain free from falls  Description  Will remain free from falls  Outcome: Ongoing  Goal: Absence of physical injury  Description  Absence of physical injury  Outcome: Ongoing     Problem: Risk for Impaired Skin Integrity  Goal: Tissue integrity - skin and mucous membranes  Description  Structural intactness and normal physiological function of skin and  mucous membranes.   Outcome: Ongoing     Problem: Pain:  Goal: Pain level will decrease  Description  Pain level will decrease  Outcome: Ongoing  Goal: Control of acute pain  Description  Control of acute pain  Outcome: Ongoing  Goal: Control of chronic pain  Description  Control of chronic pain  Outcome: Ongoing
Problem: Falls - Risk of:  Goal: Will remain free from falls  Description  Will remain free from falls  Outcome: Ongoing  Goal: Absence of physical injury  Description  Absence of physical injury  Outcome: Ongoing     Problem: Risk for Impaired Skin Integrity  Goal: Tissue integrity - skin and mucous membranes  Description  Structural intactness and normal physiological function of skin and  mucous membranes. Outcome: Ongoing     Problem: Pain:  Goal: Pain level will decrease  Description  Pain level will decrease  Outcome: Ongoing  Goal: Control of acute pain  Description  Control of acute pain  Outcome: Ongoing  Goal: Control of chronic pain  Description  Control of chronic pain  Outcome: Ongoing     Problem: Nutrition  Goal: Optimal nutrition therapy  Description  Nutrition Problem: Severe malnutrition, In context of acute illness or injury  Intervention: Food and/or Nutrient Delivery: Continue NPO  Nutritional Goals: Pt will tolerate nutrition progression with po 50% or more, wt stable.      Outcome: Ongoing     Problem: Fluid Volume:  Goal: Ability to achieve a balanced intake and output will improve  Description  Ability to achieve a balanced intake and output will improve  Outcome: Ongoing     Problem: Physical Regulation:  Goal: Ability to maintain clinical measurements within normal limits will improve  Description  Ability to maintain clinical measurements within normal limits will improve  Outcome: Ongoing  Goal: Will show no signs and symptoms of electrolyte imbalance  Description  Will show no signs and symptoms of electrolyte imbalance  Outcome: Ongoing
Problem: Nutrition  Goal: Optimal nutrition therapy  Description  Nutrition Problem: Severe malnutrition, In context of acute illness or injury  Intervention: Food and/or Nutrient Delivery: Continue NPO  Nutritional Goals: Pt will tolerate nutrition progression with po 50% or more, wt stable.      Outcome: Ongoing
improve  2/29/2020 0030 by Jaye Bruce RN  Outcome: Ongoing  2/28/2020 1950 by Alecia Fernandez RN  Outcome: Ongoing     Problem: Physical Regulation:  Goal: Ability to maintain clinical measurements within normal limits will improve  Description  Ability to maintain clinical measurements within normal limits will improve  2/29/2020 0030 by Jaye Bruce RN  Outcome: Ongoing  2/28/2020 1950 by Alecia Fernandez RN  Outcome: Ongoing  Goal: Will show no signs and symptoms of electrolyte imbalance  Description  Will show no signs and symptoms of electrolyte imbalance  2/29/2020 0030 by Jaye Bruce RN  Outcome: Ongoing  2/28/2020 1950 by Alecia Fernandez RN  Outcome: Ongoing

## 2020-03-01 NOTE — DISCHARGE SUMMARY
Discharge Summary        Date:3/1/2020        Patient Adrian Omer     YOB: 1945     Age:74 y.o. Admit Date:2/27/2020   Admission Condition:fair   Discharged Condition:stable  Discharge Date: 03/01/20       Discharge Diagnoses   Principal Problem:    Hypercalcemia of malignancy  Active Problems:    Essential hypertension    Pure hypercholesterolemia    Lymphadenopathy, inguinal    Lymphoma, small lymphocytic (HCC)    Dysphagia    Severe malnutrition (HCC)  Resolved Problems:    * No resolved hospital problems. [facility] Stay   Narrative of Hospital Course:     76 y.o. female with PMH  Stage IV small lymphocytic lymphoma followed by Dr. Aiden Monge who presented to [address] [place] [address] ED complaining of increased weakness and decreased appetite with elevated calcium levels. Noted to have Calcium level of 14 on admission, improved with fluids and zometa. Electrolytes repleted in house, seen by Oncology, to follow up outpt for repeat imaging and biopsy. Also complained of reflux symptoms, esophagram was unremarkable. Patient apetite improved in house, strength improved and stable to discharge home with outpt followup.          Physical Exam  General: Cachectic, no acute distress lying comfortably in bed. HEENT: Atraumatic normocephalic  Cardiac: Normal S1-S2 no murmurs rub or gallop. Respiratory: clear To auscultation bilaterally, no rhonchi or rales  Abdomen: nontender to palpation, no organomegaly noted.   : right huge inguinal LN  Extremities: no tenderness, no edema, moves all extremities  Psych: Affect normal and good eye contact      Consultants:   None    Time Spent on Discharge:  > 30mins    Surgeries/Procedures Performed:  NONE    Significant Diagnostic Studies:   Recent Labs:  CBC:   Lab Results   Component Value Date    WBC 4.4 02/28/2020    RBC 4.06 02/28/2020    HGB 11.3 02/28/2020    HCT 33.9 02/28/2020    MCV 83.5 02/28/2020    MCH 27.8 02/28/2020    MCHC 33.3 02/28/2020    RDW 15.7 02/28/2020     02/28/2020     BMP:    Lab Results   Component Value Date    GLUCOSE 130 03/01/2020     03/01/2020    K 3.1 03/01/2020    CL 97 03/01/2020    CO2 23 03/01/2020    ANIONGAP 12 03/01/2020    BUN 12 03/01/2020    CREATININE 0.5 03/01/2020    CALCIUM 10.3 03/01/2020    LABGLOM >60 03/01/2020    GFRAA 113 01/15/2020       Radiology Last 7 Days:  Fl Esophagram    Result Date: 2/28/2020  1. No esophageal stricture, filling defect, or outpouching. 2.  Mild gastroesophageal reflux.  Signed by Dr Roz Mcgee on 2/28/2020 9:29 AM      Discharge Plan   Disposition: Home    Provider Follow-Up:   MD RICHY Bower/ Magdiel   [phone]            Patient Instructions   Diet: regular diet    Activity: activity as tolerated      Discharge Medications         Medication List      CHANGE how you take these medications    alendronate 70 MG tablet  Commonly known as:  FOSAMAX  Take 1 tablet by mouth every 7 days  What changed:  additional instructions        CONTINUE taking these medications    acetaminophen 500 MG tablet  Commonly known as:  TYLENOL  Take 2 tablets by mouth once for 1 dose     acyclovir 200 MG capsule  Commonly known as:  ZOVIRAX     allopurinol 100 MG tablet  Commonly known as:  ZYLOPRIM     calcitonin 200 UNIT/ACT nasal spray  Commonly known as:  MIACALCIN  USE 1 SPRAY(S) INTRANASALLY ONCE DAILY     lisinopril-hydroCHLOROthiazide 20-12.5 MG per tablet  Commonly known as:  PRINZIDE;ZESTORETIC  TAKE 1 TABLET BY MOUTH ONCE DAILY     Venetoclax 10 & 50 & 100 MG Tbpk            Electronically signed by Wing Camacho MD on 3/1/20 at 10:14 AM

## 2020-03-05 NOTE — CARE COORDINATION
[facility]    Call within 2 business days of discharge: Yes    Patient: Karla Carrasco Patient : 1945   MRN: [id number]  Reason for Admission:   Discharge Date: 3/1/20 RARS: Readmission Risk Score: 14      Last Discharge [facility]       Complaint Diagnosis Description Type Department Provider    20 Leg Swelling Hypercalcemia . .. ED to Hosp-Admission (Discharged) (ADMITTED) Gerhard Fry MD; Biju Montague. .. Spoke with: N/A    Facility: [place] [id number]    Non-face-to-face services provided:  Reviewed encounter information for continuity of care prior to follow up phone call - chart notes, consults, progress notes, test results, med list, appointments, AVS, other information. Care Transitions 24 Hour Call    Care Transitions Interventions                                 Follow Up : Attempt #3 to make contact with [facility] for an initial follow up call post discharge from the hospital without success. Unable to leave a message regarding intent of call and call back information. Will discharge from CTN services due to inability to make contact with patient.      Future Appointments   Date Time Provider Fany Bonilla   3/11/2020  9:45 AM [facility]   3/11/2020 10:00 AM SCHEDULE, [facility] ON[facility] TREATMENTS [facility] HOD   3/16/2020 10:20 AM [facility] LMP CT RM 1 [facility] LMP CT [facility] Rad   3/16/2020 10:40 AM [facility] LMP CT RM 1 [facility] LMP CT [facility] Rad   2020 10:00 AM SCHEDULE, [facility] ONC TREATMENTS [facility] Sh[facility] HOD   2020 10:00 AM SCHEDULE, [facility] ONC TREATMENTS [facility] HOD   6/3/2020 10:00 AM SCHEDULE, [facility] ONC TREATMENTS [facility] ONC [facility] HOD   6/15/2020  2:00 PM [facility]       Noble Jurado RN

## 2020-03-10 NOTE — PROGRESS NOTES
BCL 6, cyclin D1, CD30 and MUM-1. Extensive necrosis, and therefore, further characterization was not possible. 3/27/2019-she was first seen by me. 4/3/2019-bone marrow biopsy/aspirate showed involvement by a cyclin D1 negative, CD5/CD23 positive monoclonal B-cell population consistent with involvement of bone marrow by B-cell SLL (10%). CD20 positive. Normal female karyotype 55 XX. FISH cytogenetics 13 q deletion   4/8/2019-CT chest showed no evidence of mediastinal, hilar adenopathy. 4/9/2019-PET scan showed FDG avid enlarged right pelvic and inguinal lymph nodes. Suggestive for active lymphoma. Specifically, a right obturator lymph node with SUV 23. Right external iliac lymph node with SUV 27. Right inguinal lymph node with SUV 11. May through November 2019-Imbruvica with progressive disease or poor tolerance. 12/3/2019- venetoclax/Gazyva fixed duration treatment 1 year expected.     Past medical history:  Past Medical History:   Diagnosis Date    Body mass index (bmi) 26.0-26.9, adult     Hyperlipidemia     Hypertension     Osteoporosis     Unspecified b-cell lymphoma, lymph nodes of inguinal region and lower limb ([facility] [address]         Past surgical history:  Past Surgical History:   Procedure Laterality Date    CHOLECYSTECTOMY      COLONOSCOPY  01/26/2009    Dr Petrina Closs, TOTAL ABDOMINAL      LYMPH NODE BIOPSY Right 3/4/2019    EXCISION OF SUPERFICIAL LYMPH NODE, RIGHT GROIN performed by Aby Santo DO at [facility]        Social history:  Social History     Socioeconomic History    Marital status:      Spouse name: Not on file    Number of children: Not on file    Years of education: Not on file    Highest education level: Not on file   Occupational History    Not on file   Social Needs    Financial resource strain: Not on file    Food insecurity     Worry: Not on file     Inability: Not on file    Transportation needs     Medical: Not on file     Non-medical: Not on file tablet TAKE 1 TABLET BY MOUTH ONCE DAILY 90 tablet 2    alendronate (FOSAMAX) 70 MG tablet Take 1 tablet by mouth every 7 days (Patient taking differently: Take 70 mg by mouth every 7 days Tuesday) 12 tablet 3    calcitonin (MIACALCIN) 200 UNIT/ACT nasal spray USE 1 SPRAY(S) INTRANASALLY ONCE DAILY 4 mL 3    acetaminophen (TYLENOL) 500 MG tablet Take 2 tablets by mouth once for 1 dose 120 tablet 3     No current facility-administered medications for this visit. Facility-Administered Medications Ordered in Other Visits   Medication Dose Route Frequency Provider Last Rate Last Dose    0.9 % sodium chloride infusion  20 mL/hr Intravenous Once Dane Luna MD 20 mL/hr at 03/11/20 1336 20 mL/hr at 03/11/20 1336    sodium chloride flush 0.9 % injection 5 mL  5 mL Intravenous PRN Dane Luna MD        sodium chloride flush 0.9 % injection 10 mL  10 mL Intravenous PRN Dane Luna MD        heparin flush 100 UNIT/ML injection 500 Units  500 Units Intracatheter PRN Dane Luna MD        obinutuzumab (GAZYVA) 1,000 mg in sodium chloride 0.9 % 250 mL chemo IVPB  1,000 mg Intravenous Once Dane Luna MD 56 mL/hr at 03/11/20 1451          REVIEW OF SYSTEMS:    Constitutional: no fever, no night sweats, fatigue;   HEENT: no blurring of vision, no double vision, no hearing difficulty, no tinnitus,no ulceration, no dental caries, no dysphagia  Lungs: no cough, no shortness of breath, no wheeze;   CVS: no palpitation, no chest pain, no shortness of breath; hypertension  GI: no abdominal pain, no nausea , no vomiting, no constipation; diarrhea  GABE: no dysuria, frequency and urgency, no hematuria, no kidney stones;   Musculoskeletal: no joint pain, swelling , stiffness; right  lower extremity edema   endocrine: no polyuria, polydypsia, no cold or heat intolerence;    Hematology/lymphatic: no easy brusing or bleeding, no hx of clotting disorder;  Right/left inguinal adenopathy. Dermatology: no skin rash, no eczema, no pruritis;   Psychiatry: no depression, no anxiety,no panic attacks, no suicide ideation; Neurology: no syncope, no seizures, no numbness or tingling of hands, no numbness or tingling of feet, no paresis;     PHYSICAL EXAM:    Vitals signs:  BP (!) 140/80   Pulse 102   Temp 98.2 °F (36.8 °C)   Ht 4' 11\" (1.499 m)   Wt 119 lb (54 kg)   LMP  (LMP Unknown)   SpO2 96%   BMI 24.04 kg/m²     Pain scale:0  Pain Score:   0 - No pain     CONSTITUTIONAL: Alert, appropriate, no acute distress,   EYES: Non icteric, EOM intact, pupils equal round and reactive to light and accommodation. ENT: Oral mucus membranes moist, no oral pharyngeal lesions. External inspection of ears and nose are normal.   NECK: Supple, no masses. No palpable thyroid mass    CHEST/LUNGS: CTA bilaterally, normal respiratory effort   CARDIOVASCULAR: RRR, no murmurs. No lower extremity edema   ABDOMEN: soft non-tender, active bowel sounds, no hepatosplenomegaly. No palpable masses. EXTREMITIES: warm, Full ROM of all fours extremities. No focal weakness. Right lower extremity edema   SKIN: warm, dry with no rashes or lesions  LYMPH: No cervical, clavicular, axillary palpable right inguinal lymphadenopathy  NEUROLOGIC: follows commands, non focal.   PSYCH: mood and affect appropriate. Alert and oriented to time and place and person. Relevant Lab findings:  CBC:  WBC-6.41  HGB-12.9  PLT-120K  Neut-4.3    Relevant Imaging studies:  Fl Esophagram    Result Date: 2/28/2020  1. No esophageal stricture, filling defect, or outpouching. 2.  Mild gastroesophageal reflux.      ASSESSMENT:    Orders Placed This Encounter   Procedures    Comprehensive Metabolic Panel     Standing Status:   Future     Standing Expiration Date:   3/11/2021    Lactate Dehydrogenase     Standing Status:   Future     Standing Expiration Date:   3/11/2021    Beta 2 Microglobulin, Serum     Standing Status:   Future Standing Expiration Date:   3/11/2021      Ismael Navarro was seen today for cancer. 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 Bone marrow with limited involvement around 10% of bone marrow cellularity. Poor tolerance with Imbruvica. In addition, the patient seem to have disease progression. I recommended to continue with venetoclax and Roanne Dates   The patient was counseled today about diagnosis, staging, prognosis, diagnostic tests, medications, side effects and disease management. The method of counseling included verbal explanation. The patient verbalized understanding. Continue Gazyva. Continue venetoclax. Repeat CT chest abdomen pelvis next week. Venetoclax 5 weeks ramp-up dose:  Cycle 1: Gazyva 100 mg IV day 1, 900 mg day 2, and 1000 mg day 8 and day 15  Cycle 2-6: Gazyva 1000 mg every 3 weeks   For a treatment duration of 12-month. Continue venetoclax 400 mg. Tumor lysis prophylaxis-continue allopurinol    Infection prophylaxis-continue acyclovir 400 mg p.o. twice daily    Treatment related toxicity- fatigue. #Right lower extremity edema-likely secondary to compressive inguinal adenopathy/Baker's cyst on the right. No signs of DVT on prior vascular study 6/18/2019. She continued to have significant leg swelling in the right lower extremity. #Hypertension-controlled. Follow-up with PCP. #Hypercalcemia- calcium 11.4.   The patient had an episode of hypercalcemia in the past.  We will proceed with

## 2020-03-15 NOTE — PROGRESS NOTES
HISTORY OF PRESENT ILLNESS:  Smooth Mackenzie is a 35-year-old female who has a history of stage IV lymphocytic lymphoma treated by her medical oncologist.  She has been refractory to treatment. She presented recently with hypercalcemia of malignancy and was treated by the hospitalist and her oncologist.  She has some bulky adenopathy of the groin bilaterally particularly on the right side. She has had right inguinal lymph node excision by Dr. Kelechi Garcia. She also has had some slight significant lymphedema secondary to previous biopsy and also for tumor burden. She has had some pain in the lower extremities. No reported recent fevers.     Patient Active Problem List    Diagnosis Date Noted    Hypercalcemia      Severe malnutrition ([facility] [address] 02/28/2020    Hypercalcemia of malignancy 02/27/2020    Dysphagia 02/27/2020    Unspecified b-cell lymphoma, lymph nodes of inguinal region and lower limb (HCC)     Body mass index (bmi) 26.0-26.9, adult     Lymphoma, small lymphocytic ([facility] [address] 06/06/2019    Lymphadenopathy, inguinal 02/28/2019    Localized osteoporosis without current pathological fracture 09/09/2017    Essential hypertension 09/09/2017    Pure hypercholesterolemia 09/09/2017    IFG (impaired fasting glucose) 09/09/2017    Breast density 03/22/2016     Current Outpatient Medications   Medication Sig Dispense Refill    lisinopril (PRINIVIL;ZESTRIL) 20 MG tablet Take 20 mg by mouth daily      Garlic 1512 MG CAPS Take 1,000 Units by mouth daily      Glucosamine-Chondroit-Vit C-Mn (GLUCOSAMINE 1500 COMPLEX) CAPS Take by mouth 2 times daily      Vitamins-Lipotropics (LIPO-FLAVONOID PLUS PO) Take by mouth 2 times daily For Hearing      acyclovir (ZOVIRAX) 200 MG capsule Take 400 mg by mouth 2 times daily      Venetoclax 10 & 50 & 100 MG TBPK Take 400 mg by mouth daily Started 1/15/20-starter pack       lisinopril-hydrochlorothiazide (PRINZIDE;ZESTORETIC) 20-12.5 MG per tablet TAKE 1 TABLET BY MOUTH ONCE DAILY 90 tablet 2    alendronate (FOSAMAX) 70 MG tablet Take 1 tablet by mouth every 7 days (Patient taking differently: Take 70 mg by mouth every 7 days Tuesday) 12 tablet 3    calcitonin (MIACALCIN) 200 UNIT/ACT nasal spray USE 1 SPRAY(S) INTRANASALLY ONCE DAILY 4 mL 3    acetaminophen (TYLENOL) 500 MG tablet Take 2 tablets by mouth once for 1 dose 120 tablet 3    potassium chloride (KLOR-CON M) 20 MEQ extended release tablet Take 1 tablet by mouth 2 times daily 180 tablet 1    allopurinol (ZYLOPRIM) 100 MG tablet Take 100 mg by mouth daily       No current facility-administered medications for this visit. Allergies: Patient has no known allergies. Past Medical History:   Diagnosis Date    Body mass index (bmi) 26.0-26.9, adult     Hyperlipidemia     Hypertension     Osteoporosis     Unspecified b-cell lymphoma, lymph nodes of inguinal region and lower limb [facility])      Past Surgical History:   Procedure Laterality Date    CHOLECYSTECTOMY      COLONOSCOPY  2009    Dr Harmeet Doss, TOTAL ABDOMINAL      LYMPH NODE BIOPSY Right 3/4/2019    EXCISION OF SUPERFICIAL LYMPH NODE, RIGHT GROIN performed by Mandi Eckert DO at [facility] OR     Family History   Problem Relation Age of Onset    Lung Cancer Mother         not a smoker    Tuberculosis Father      Social History     Tobacco Use    Smoking status: Former Smoker     Packs/day: 1.00     Years: 2.00     Pack years: 2.00     Types: Cigarettes     Start date:      Last attempt to quit:      Years since quittin.2    Smokeless tobacco: Never Used    Tobacco comment: Retired from being a  and [employer]   Substance Use Topics    Alcohol use: No           Review of Systems   Review of system reviewed and positive for the above. In addition, she reports no fevers. All other review of systems noted be negative.     Physical examination  Blood pressure (!) 142/70, pulse 106, temperature 97.8 °F (36.6 °C), temperature source Temporal, height 4' 11\" (1.499 m), weight 125 lb (56.7 kg), SpO2 97 %, not currently breastfeeding. GENERAL:  Reveals a 76 y.o. female that  appears to be in no acute distress. HEENT:  Head is normocephalic and atraumatic. NECK:  Neck is supple without masses     AXILLA:   There is no appreciable axillary adenopathy  CHEST:  Patient has normal respiratory effort. Chest is clear bilaterally with good thoracic expansion. HEART:  Heart demonstrated a regular rhythm and rate    ABDOMEN:   The abdomen is soft and nontender. Groin exam demonstrated bilateral adenopathy worse on the right side. EXTREMITIES:  Demonstrate significant edema in both lower extremities worse on the right side. PSYCHIATRIC:  Patient is oriented to time, place and person. The patient's mood and affect are normal.      IMPRESSION:  Lymphoma refractory to treatment  Lower extremity lymphedema    PLAN: She is scheduled to see her medical oncologist soon. She is also scheduled for a CT scan. I am reluctant to recommend excisional biopsy of the groin lymph node due to the lymphedema. We will await her evaluation by the medical oncologist and also her diagnostic testing that is already scheduled. Approximately 20 minutes of face-to-face time was spent during this encounter. A significant portion of this time was spent in counseling.

## 2020-03-19 NOTE — PROGRESS NOTES
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 deletion. She had a very high burden of disease with significant inguinal bilateral adenopathy. She has been started on treatment with Imbruvica initially without response and then [facility] and venetoclax. Unfortunately, despite 2 lines of treatment there was no treatment response. Therefore, a repeat biopsy was performed and CT scans. CT scan showed widespread lymphadenopathy in the abdomen pelvis with bulk pelvic adenopathy and liver masses. The appearance was of a high-grade disease. Therefore she was recommended a repeat biopsy and to start CHOP chemotherapy. She is here today for cycle #1. Her performance status is quite significant. She has been feeling more fatigued. She has lost more weight. She has significant right lower extremity swelling. Hematology history  Ms Eriberto Chanel was first seen by me on 3/27/2019 referred by Dr. Precious Urena for a diagnosis of B-cell lymphoma. The patient had a history of a right inguinal adenopathy that was treated with antibiotic without resolution. Therefore a biopsy was performed. 12/13/2018-CT abdomen pelvis showed a moderately prominent right inguinal lymph node. In addition, an oval solid nodule in the left labium/left vagina wall measures 2.5 x 1.6 cm.   2/20/2019-the patient was seen by Dr. Michael Mesa, who requested an ultrasound of the pelvis.    2/28/2019-US pelvis showed a hypoechoic 2.3 x 2.1 x 2.1 cm area within the vagina concerning for Bartholin's cyst.   2/28/2019-she was seen by Dr. Maliha Espinoza from General surgery with complains of enlarging right inguinal adenopathy, and therefore was recommended FNA biopsy   3/4/2019-FNA biopsy consistent B-cell lymphoma on morphology and IHC (positive for CD3, CD5 and CD20 and negative for BCL-2, BCL 6, cyclin D1, CD30 and MUM-1. Extensive necrosis, and therefore, further characterization was not possible. 3/27/2019-she was first seen by me. 4/3/2019-bone marrow biopsy/aspirate showed involvement by a cyclin D1 negative, CD5/CD23 positive monoclonal B-cell population consistent with involvement of bone marrow by B-cell SLL (10%). CD20 positive. Normal female karyotype 55 XX. FISH cytogenetics 13 q deletion   4/8/2019-CT chest showed no evidence of mediastinal, hilar adenopathy. 4/9/2019-PET scan showed FDG avid enlarged right pelvic and inguinal lymph nodes. Suggestive for active lymphoma. Specifically, a right obturator lymph node with SUV 23. Right external iliac lymph node with SUV 27. Right inguinal lymph node with SUV 11. May through November 2019-Imbruvica with progressive disease or poor tolerance. 12/3/2019- venetoclax/Gazyva fixed duration treatment 1 year expected. 2/28/2020 Fl Esophagram No esophageal stricture, filling defect, or outpouching. Mild gastroesophageal reflux. 3/16/2020-CT chest abdomen pelvis showed no evidence of lymphadenopathy in the chest. Marked interval progression of disease in the abdomen and pelvis. Lesions are seen in the liver, pancreas, periportal region, left retroperitoneal area, and bilateral inguinal chains. There is also enlargement of previously seen right pelvic lymph nodes.        Past medical history:  Past Medical History:   Diagnosis Date    Body mass index (bmi) 26.0-26.9, adult     Hyperlipidemia     Hypertension     Osteoporosis     Unspecified b-cell lymphoma, lymph nodes of inguinal region and lower limb [facility])         Past surgical history:  Past Surgical History:   Procedure Laterality Date    CHOLECYSTECTOMY      COLONOSCOPY  2009    Dr Giuliana Regalado, [address] LYMPH NODE BIOPSY Right 3/4/2019    EXCISION OF SUPERFICIAL LYMPH NODE, RIGHT GROIN performed by Mervat Schafer DO at [facility]        Social history:  Social History     Socioeconomic History    Marital status:      Spouse name: Not on file    Number of children: Not on file    Years of education: Not on file    Highest education level: Not on file   Occupational History    Not on file   Social Needs    Financial resource strain: Not on file    Food insecurity     Worry: Not on file     Inability: Not on file    Transportation needs     Medical: Not on file     Non-medical: Not on file   Tobacco Use    Smoking status: Former Smoker     Packs/day: 1.00     Years: 2.00     Pack years: 2.00     Types: Cigarettes     Start date:      Last attempt to quit:      Years since quittin.2    Smokeless tobacco: Never Used    Tobacco comment: Retired from being a  and [employer]   Substance and Sexual Activity    Alcohol use: No    Drug use: No    Sexual activity: Never   Lifestyle    Physical activity     Days per week: Not on file     Minutes per session: Not on file    Stress: Not on file   Relationships    Social connections     Talks on phone: Not on file     Gets together: Not on file     Attends [employer] service: Not on file     Active member of club or organization: Not on file     Attends meetings of clubs or organizations: Not on file     Relationship status: Not on file    Intimate partner violence     Fear of current or ex partner: Not on file     Emotionally abused: Not on file     Physically abused: Not on file     Forced sexual activity: Not on file   Other Topics Concern    Not on file   Social History Narrative    Not on file        Family difficulty, no tinnitus,no ulceration, no dental caries, no dysphagia  Lungs: no cough, no shortness of breath, no wheeze;   CVS: no palpitation, no chest pain, no shortness of breath; hypertension  GI: no abdominal pain, no nausea , no vomiting, no constipation; diarrhea  GABE: no dysuria, frequency and urgency, no hematuria, no kidney stones;   Musculoskeletal: no joint pain, swelling , stiffness; right  lower extremity edema   endocrine: no polyuria, polydypsia, no cold or heat intolerence; Hematology/lymphatic: no easy brusing or bleeding, no hx of clotting disorder;  Right/left inguinal adenopathy. Dermatology: no skin rash, no eczema, no pruritis;   Psychiatry: no depression, no anxiety,no panic attacks, no suicide ideation; Neurology: no syncope, no seizures, no numbness or tingling of hands, no numbness or tingling of feet, no paresis;     PHYSICAL EXAM:    Vitals signs:  BP (!) 140/70   Pulse 102   Temp 97.8 °F (36.6 °C) (Oral)   Resp 16   Wt 115 lb (52.2 kg)   LMP  (LMP Unknown)   SpO2 95%   BMI 23.23 kg/m²   Pain scale:0        CONSTITUTIONAL: Alert, appropriate, no acute distress,   EYES: Non icteric, EOM intact, pupils equal round and reactive to light and accommodation. ENT: Oral mucus membranes moist, no oral pharyngeal lesions. External inspection of ears and nose are normal.   NECK: Supple, no masses. No palpable thyroid mass    CHEST/LUNGS: CTA bilaterally, normal respiratory effort   CARDIOVASCULAR: RRR, no murmurs. No lower extremity edema   ABDOMEN: soft non-tender, active bowel sounds, no hepatosplenomegaly. No palpable masses. EXTREMITIES: warm, Full ROM of all fours extremities. No focal weakness. Right lower extremity edema   SKIN: warm, dry with no rashes or lesions  LYMPH: No cervical, clavicular, axillary palpable right inguinal lymphadenopathy  NEUROLOGIC: follows commands, non focal.   PSYCH: mood and affect appropriate.  Alert and oriented to time and place and person. Relevant Lab findings:  OBU:[id number]  WBC-9.09  HGB-12.1  PLT-164,000  Neut-7.07    Relevant Imaging studies:  Ct Chest W Contrast    Result Date: 3/16/2020  1. No evidence of lymphadenopathy in the chest. 2. Findings in the upper abdomen will be described in a separate report. Signed by Dr Isamar Ng on 3/16/2020 12:12 PM    Ct Abdomen Pelvis W Iv Contrast   Result Date: 3/16/2020  1. Marked interval progression of disease in the abdomen and pelvis. No lesions are seen in the liver, pancreas, periportal region, left retroperitoneal area, and bilateral inguinal chains. There is also enlargement of previously seen right pelvic lymph nodes. 2. A phone call was made to Dr. Jc Medina at 12:20 PM on 3/16/2020. A message was left with his voicemail. Signed by Dr Isamar Ng on 3/16/2020 12:21 PM    Fl Esophagram    Result Date: 2/28/2020  1. No esophageal stricture, filling defect, or outpouching. 2.  Mild gastroesophageal reflux.      ASSESSMENT:    Orders Placed This Encounter   Procedures    Comprehensive Metabolic Panel     Standing Status:   Future     Number of Occurrences:   1     Standing Expiration Date:   3/20/2021    Uric Acid     Standing Status:   Future     Number of Occurrences:   1     Standing Expiration Date:   3/20/2021    CBC Auto Differential     Please fax results [phone]     Standing Status:   Standing     Number of Occurrences:   46     Standing Expiration Date:   3/20/2021    Comprehensive Metabolic Panel     Please fax results [phone]     Standing Status:   Standing     Number of Occurrences:   52     Standing Expiration Date:   3/20/2021    Uric Acid     Please fax results [phone]     Standing Status:   Standing     Number of Occurrences:   52     Standing Expiration Date:   3/20/2021    Phosphorus     Please fax results [phone]     Standing Status:   Standing     Number of Occurrences:   52     Standing Expiration Date:   3/20/2021    Magnesium personally performed the services described in this documentation as scribed by Nicole Salazar MA in my presence and is both accurate and complete. I have seen, examined and reviewed this patient medication list, appropriate labs and imaging studies. I reviewed relevant medical records and others physicians notes. I discussed the plans of care with the patient. I answered all the questions to the patients satisfaction. (Please note that portions of this note were completed with a voice recognition program. Efforts were made to edit the dictations but occasionally words are mis-transcribed.)  Over 50% of the total visit time of 25 minutes in face to face encounter with the patient, out of which more than 50% of the time was spent in counseling patient or family and coordination of care. Counseling included but was not limited to time spent reviewing labs, imaging studies/ treatment plan and answering questions.

## 2020-03-20 NOTE — PROGRESS NOTES
Discussed low potassium with Dr. Ulysses Fontana and pt. Has not been taking at home. Will resume 20mEq BID.

## 2020-03-27 NOTE — TELEPHONE ENCOUNTER
Spoke with patients  regarding WBC 0.5. I reviewed neutropenic precautions. He voiced understanding.

## 2020-03-31 PROBLEM — C85.90 LYMPHOMA (HCC): Status: ACTIVE | Noted: 2019-06-06

## 2020-03-31 NOTE — ANESTHESIA PRE PROCEDURE
Department of Anesthesiology  Preprocedure Note       Name:  Orlando Meigs   Age:  76 y.o.  :  1945                                          MRN:  [id number]         Date:  3/31/2020      Surgeon: Anibal Valdivia):  Rinku Goodwin MD    Procedure: PORT INSERTION WITH FLUORO (N/A )    Medications prior to admission:   Prior to Admission medications    Medication Sig Start Date End Date Taking?  Authorizing Provider   alendronate (FOSAMAX) 70 MG tablet Take 70 mg by mouth every 7 days EVERY TUESDAY    Historical Provider, MD   lisinopril-hydroCHLOROthiazide (PRINZIDE;ZESTORETIC) 20-12.5 MG per tablet Take 1 tablet by mouth daily    Historical Provider, MD   allopurinol (ZYLOPRIM) 300 MG tablet Take 1 tablet by mouth daily 3/17/20   Dane Luna MD   potassium chloride (KLOR-CON M) 20 MEQ extended release tablet Take 1 tablet by mouth 2 times daily 3/11/20   Dane Luna MD       Current medications:    Current Facility-Administered Medications   Medication Dose Route Frequency Provider Last Rate Last Dose    ceFAZolin (ANCEF) 1 g in sterile water 10 mL IV syringe  1 g Intravenous Once Rinku Goodwin MD        lactated ringers infusion   Intravenous Continuous Rinku Goodwin MD           Allergies:  No Known Allergies    Problem List:    Patient Active Problem List   Diagnosis Code    Breast density R92.2    Localized osteoporosis without current pathological fracture M81.6    Essential hypertension I10    Pure hypercholesterolemia E78.00    IFG (impaired fasting glucose) R73.01    Lymphadenopathy, inguinal R59.0    Lymphoma, small lymphocytic (HCC) C83.00    Unspecified b-cell lymphoma, lymph nodes of inguinal region and lower limb (HCC) C85.15    Body mass index (bmi) 26.0-26.9, adult Z68.26    Hypercalcemia of malignancy E83.52    Dysphagia R13.10    Severe malnutrition (HCC) E43    Hypercalcemia  E83.52    Antineoplastic chemotherapy induced pancytopenia ([facility]) ([facility]  D61.918 CL 97 03/01/2020    CO2 23 03/01/2020    BUN 12 03/01/2020    CREATININE 0.5 03/01/2020    GFRAA 113 01/15/2020    AGRATIO 1.5 01/15/2020    LABGLOM >60 03/01/2020    GLUCOSE 130 03/01/2020    PROT 6.5 02/27/2020    CALCIUM 10.3 03/01/2020    BILITOT 0.6 02/27/2020    ALKPHOS 239 02/27/2020    AST 55 02/27/2020    ALT 30 02/27/2020       POC Tests: No results for input(s): POCGLU, POCNA, POCK, POCCL, POCBUN, POCHEMO, POCHCT in the last 72 hours. Coags: No results found for: PROTIME, INR, APTT    HCG (If Applicable): No results found for: PREGTESTUR, PREGSERUM, HCG, HCGQUANT     ABGs: No results found for: PHART, PO2ART, ASW2XZB, CBQ4HQA, BEART, Q3XGFAZF     Type & Screen (If Applicable):  No results found for: LABABO, [address]    Anesthesia Evaluation  Patient summary reviewed and Nursing notes reviewed no history of anesthetic complications:   Airway: Mallampati: II  TM distance: >3 FB   Neck ROM: full  Mouth opening: > = 3 FB Dental: normal exam         Pulmonary:Negative Pulmonary ROS and normal exam  breath sounds clear to auscultation      (-) shortness of breath and not a current smoker          Patient did not smoke on day of surgery. Cardiovascular:    (+) hypertension: moderate, hyperlipidemia    (-) CAD,  angina and  CHF    NYHA Classification: I  ECG reviewed  Rhythm: regular  Rate: normal           Beta Blocker:  Not on Beta Blocker         Neuro/Psych:   Negative Neuro/Psych ROS     (-) seizures, CVA and depression/anxiety            GI/Hepatic/Renal: Neg GI/Hepatic/Renal ROS  (+) liver disease:,      (-) hiatal hernia and GERD       Endo/Other: Negative Endo/Other ROS   (+) blood dyscrasia: anemia and thrombocytopenia:., electrolyte abnormalities, malignancy/cancer. Pt had PAT visit. Abdominal:       Abdomen: soft.     Vascular:                                        Anesthesia Plan      MAC     ASA 3     (Iv zofran within 30 min of closing )  Induction:

## 2020-03-31 NOTE — OP NOTE
away the sheath,  and positioned the catheter appropriately again under fluoroscopic  guidance. We did utilize intravenous contrast to facilitate  visualization of the catheter. We then attached the catheter to the  port in the usual fashion and sutured the port into place with 2-0 silk  stitches. We irrigated the pocket with Kefzol-bacitracin solution,  flushed and aspirated the port with heparinized saline, once again  confirmed good position of the port, and closed with 3-0 Vicryl for the  subcu and 4-0 Monocryl for the skin. Sterile dressing was applied. We  then turned our attention to the pubic area. We anesthetized with 1/16%  Xylocaine, made an elliptical incision, and excised the most left-sided  lesion in its entirety and then closed with interrupted 3-0 nylon. Estimated blood loss, minimal.  Complications, none. She tolerated the  procedure well.         Yoko Ashraf MD    D: 03/31/2020 11:09:23      T: 03/31/2020 11:47:54     [id number]  Job#: [id number]     Doc#: [id number]    CC:

## 2020-03-31 NOTE — H&P
(PRINZIDE;ZESTORETIC) 20-12.5 MG per tablet TAKE 1 TABLET BY MOUTH ONCE DAILY 90 tablet 2    alendronate (FOSAMAX) 70 MG tablet Take 1 tablet by mouth every 7 days (Patient taking differently: Take 70 mg by mouth every 7 days Tuesday) 12 tablet 3    calcitonin (MIACALCIN) 200 UNIT/ACT nasal spray USE 1 SPRAY(S) INTRANASALLY ONCE DAILY 4 mL 3    acetaminophen (TYLENOL) 500 MG tablet Take 2 tablets by mouth once for 1 dose 120 tablet 3    potassium chloride (KLOR-CON M) 20 MEQ extended release tablet Take 1 tablet by mouth 2 times daily 180 tablet 1    allopurinol (ZYLOPRIM) 100 MG tablet Take 100 mg by mouth daily          No current facility-administered medications for this visit.          Allergies: Patient has no known allergies. Past Medical History        Past Medical History:   Diagnosis Date    Body mass index (bmi) 26.0-26.9, adult      Hyperlipidemia      Hypertension      Osteoporosis      Unspecified b-cell lymphoma, lymph nodes of inguinal region and lower limb [facility])           Past Surgical History         Past Surgical History:   Procedure Laterality Date    CHOLECYSTECTOMY        COLONOSCOPY   2009     Dr Nathalia Ashton, TOTAL ABDOMINAL        LYMPH NODE BIOPSY Right 3/4/2019     EXCISION OF SUPERFICIAL LYMPH NODE, RIGHT GROIN performed by Lola Dias DO at [facility] OR         Family History         Family History   Problem Relation Age of Onset    Lung Cancer Mother           not a smoker    Tuberculosis Father           Social History            Tobacco Use    Smoking status: Former Smoker       Packs/day: 1.00       Years: 2.00       Pack years: 2.00       Types: Cigarettes       Start date: 65       Last attempt to quit: 1977       Years since quittin.2    Smokeless tobacco: Never Used    Tobacco comment: Retired from being a  and [address] [place] [address]   Substance Use Topics    Alcohol use:  No               Review of Systems   Review of system reviewed and positive for the above. In addition, she reports no fevers. All other review of systems noted be negative.     Physical examination  Blood pressure (!) 142/70, pulse 106, temperature 97.8 °F (36.6 °C), temperature source Temporal, height 4' 11\" (1.499 m), weight 125 lb (56.7 kg), SpO2 97 %, not currently breastfeeding.     GENERAL:  Reveals a 76 y.o. female that  appears to be in no acute distress.     HEENT:  Head is normocephalic and atraumatic.     NECK:  Neck is supple without masses      AXILLA:   There is no appreciable axillary adenopathy  CHEST:  Patient has normal respiratory effort. Chest is clear bilaterally with good thoracic expansion.       HEART:  Heart demonstrated a regular rhythm and rate     ABDOMEN:   The abdomen is soft and nontender. Groin exam demonstrated bilateral adenopathy worse on the right side.        EXTREMITIES:  Demonstrate significant edema in both lower extremities worse on the right side.     PSYCHIATRIC:  Patient is oriented to time, place and person.    The patient's mood and affect are normal.        IMPRESSION:  Lymphoma refractory to treatment  Lower extremity lymphedema     PLAN: Port placement     .

## 2020-03-31 NOTE — BRIEF OP NOTE
Brief Postoperative Note      DATE OF PROCEDURE: 3/31/2020     SURGEON: Harry Lutz MD    PREOPERATIVE DIAGNOSIS: C83.00    POSTOPERATIVE DIAGNOSIS: Same     OPERATION: Procedure(s):  PORT INSERTION WITH FLUORO  PUNCH BIOPSY OF PUBIS    ANESTHESIA: Monitor Anesthesia Care    ESTIMATED BLOOD LOSS: Minimal    COMPLICATIONS: None. SPECIMENS:   ID Type Source Tests Collected by Time Destination   A : SKIN LESION RIGHT PUBIS Tissue Skin SURGICAL PATHOLOGY Harry Lutz MD 3/31/2020 1293        DRAINS: None    The patient tolerated the procedure well.     Electronically signed by Harry Lutz MD  on 3/31/2020 at 9:59 AM

## 2020-03-31 NOTE — ANESTHESIA POSTPROCEDURE EVALUATION
Department of Anesthesiology  Postprocedure Note    Patient: Gio Mejia  MRN: [id number]  YOB: 1945  Date of evaluation: 3/31/2020  Time:  9:31 AM     Procedure Summary     Date:  03/31/20 Room / Location:  [address]    Anesthesia Start:  6043 Anesthesia Stop:  5623    Procedures:       PORT INSERTION WITH FLUORO (N/A Chest)      PUNCH BIOPSY OF PUBIS (Right ) Diagnosis:  (C83.00)    Surgeon:  Noemí Simons MD Responsible Provider:  BEE Hnog CRNA    Anesthesia Type:  MAC ASA Status:  3          Anesthesia Type: MAC    Harley Phase I: Harley Score: 9    Harley Phase II:      Last vitals: Reviewed and per EMR flowsheets.        Anesthesia Post Evaluation    Patient location during evaluation: bedside  Patient participation: complete - patient participated  Level of consciousness: awake and alert  Pain score: 0  Airway patency: patent  Nausea & Vomiting: no nausea  Complications: no  Cardiovascular status: hemodynamically stable  Respiratory status: acceptable  Hydration status: euvolemic

## 2020-04-09 NOTE — PROGRESS NOTES
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 deletion. She had a very high burden of disease with significant inguinal bilateral adenopathy. She has been started on treatment with Imbruvica initially without response and then [facility] and venetoclax. Unfortunately, despite 2 lines of treatment there was no treatment response. Therefore, a repeat biopsy was performed and CT scans and was compatible with high-grade lymphoma . CT scan also showed widespread lymphadenopathy in the abdomen pelvis with bulk pelvic adenopathy and liver masses. She was recommended R-CHOP chemotherapy. She noticed a significant reduction in the size of her bilateral inguinal adenopathy. She also noticed improvement of her right lower extremity edema. She has been more mobile. She was noted to have a skin infection in the pelvic area. A biopsy was performed that showed severe inflammation. She has not been started on antibiotics. She reports drainage of the lesion. Hematology history  Ms Jose Juan Gunter was first seen by me on 3/27/2019 referred by Dr. Sade Benavides for a diagnosis of B-cell lymphoma. The patient had a history of a right inguinal adenopathy that was treated with antibiotic without resolution. Therefore a biopsy was performed. 12/13/2018-CT abdomen pelvis showed a moderately prominent right inguinal lymph node.  In addition, an oval solid nodule in the left labium/left vagina wall measures 2.5 x 1.6 cm.   2/20/2019-the patient was seen by Dr. Jevon Moulton, who requested an ultrasound of the pelvis. 2/28/2019-US pelvis showed a hypoechoic 2.3 x 2.1 x 2.1 cm area within the vagina concerning for Bartholin's cyst.   2/28/2019-she was seen by Dr. Armando Munoz from General surgery with complains of enlarging right inguinal adenopathy, and therefore was recommended FNA biopsy   3/4/2019-FNA biopsy consistent B-cell lymphoma on morphology and IHC (positive for CD3, CD5 and CD20 and negative for BCL-2, BCL 6, cyclin D1, CD30 and MUM-1. Extensive necrosis, and therefore, further characterization was not possible. 3/27/2019-she was first seen by me. 4/3/2019-bone marrow biopsy/aspirate showed involvement by a cyclin D1 negative, CD5/CD23 positive monoclonal B-cell population consistent with involvement of bone marrow by B-cell SLL (10%). CD20 positive. Normal female karyotype 55 XX. FISH cytogenetics 13 q deletion   4/8/2019-CT chest showed no evidence of mediastinal, hilar adenopathy. 4/9/2019-PET scan showed FDG avid enlarged right pelvic and inguinal lymph nodes. Suggestive for active lymphoma. Specifically, a right obturator lymph node with SUV 23. Right external iliac lymph node with SUV 27. Right inguinal lymph node with SUV 11. May through November 2019-Imbruvica with progressive disease or poor tolerance. 12/3/2019- venetoclax/Gazyva fixed duration treatment 1 year expected. 2/28/2020 Fl Esophagram No esophageal stricture, filling defect, or outpouching. Mild gastroesophageal reflux. 3/16/2020-CT chest abdomen pelvis showed no evidence of lymphadenopathy in the chest. Marked interval progression of disease in the abdomen and pelvis. Lesions are seen in the liver, pancreas, periportal region, left retroperitoneal area, and bilateral inguinal chains. There is also enlargement of previously seen right pelvic lymph nodes. 3/17/2020- core biopsy of right inguinal lymph node consistent with high-grade lymphoma.   Flow cytometry positive for CD20 and BCL 6 and negative for CD5. BCL-2 mostly negative. Ki-67 95%. Findings consistent with high-grade B-cell lymphoma. 3/20/2020- recommended 6 cycles of R-CHOP.       Past medical history:  Past Medical History:   Diagnosis Date    Body mass index (bmi) 26.0-26.9, adult     Hyperlipidemia     Hypertension     Osteoporosis     Unspecified b-cell lymphoma, lymph nodes of inguinal region and lower limb (HCC)         Past surgical history:  Past Surgical History:   Procedure Laterality Date    CHOLECYSTECTOMY      COLONOSCOPY  2009    Dr Noemi Ocampo, TOTAL ABDOMINAL      LYMPH NODE BIOPSY Right 3/4/2019    EXCISION OF SUPERFICIAL LYMPH NODE, RIGHT GROIN performed by Villa Wilde DO at [facility]. Right 3/31/2020    PUNCH BIOPSY OF PUBIS performed by Desiree Pina MD at [address] N/A 3/31/2020    PORT INSERTION WITH FLUORO performed by Desiree Pina MD at [address] history:  Social History     Socioeconomic History    Marital status:      Spouse name: Not on file    Number of children: Not on file    Years of education: Not on file    Highest education level: Not on file   Occupational History    Not on file   Social Needs    Financial resource strain: Not on file    Food insecurity     Worry: Not on file     Inability: Not on file    Transportation needs     Medical: Not on file     Non-medical: Not on file   Tobacco Use    Smoking status: Former Smoker     Packs/day: 1.00     Years: 2.00     Pack years: 2.00     Types: Cigarettes     Start date:      Last attempt to quit:      Years since quittin.3    Smokeless tobacco: Never Used    Tobacco comment: Retired from being a  and [address] [employer]   Substance and Sexual Activity    Alcohol use: No    Drug use: No    Sexual activity: Never   Lifestyle    Physical activity     Days per week: Not on file     Minutes per session: Not on file    Stress: Not on file   Relationships    Social connections     Talks on phone: Not on file     Gets together: Not on file     Attends [employer] service: Not on file     Active member of club or organization: Not on file     Attends meetings of clubs or organizations: Not on file     Relationship status: Not on file    Intimate partner violence     Fear of current or ex partner: Not on file     Emotionally abused: Not on file     Physically abused: Not on file     Forced sexual activity: Not on file   Other Topics Concern    Not on file   Social History Narrative    Not on file        Family history:   Family History   Problem Relation Age of Onset   Alma Rosa  Mother         not a smoker    Tuberculosis Father         Current Outpatient Medications   Medication Sig Dispense Refill    sulfamethoxazole-trimethoprim (BACTRIM DS;SEPTRA DS) 800-160 MG per tablet Take 1 tablet by mouth every 12 hours for 10 days 20 tablet 0    pantoprazole (PROTONIX) 20 MG tablet Take 1 tablet by mouth daily For 5 days when taking prednisone 30 tablet 0    predniSONE (DELTASONE) 20 MG tablet Take 5 tablets by mouth daily for 5 days Take 100mg daily x 5 days starting the day of chemotherapy every 21 days 10 tablet 3    lisinopril-hydroCHLOROthiazide (PRINZIDE;ZESTORETIC) 20-12.5 MG per tablet Take 1 tablet by mouth daily      allopurinol (ZYLOPRIM) 300 MG tablet Take 1 tablet by mouth daily 30 tablet 3    potassium chloride (KLOR-CON M) 20 MEQ extended release tablet Take 1 tablet by mouth 2 times daily 180 tablet 1     No current facility-administered medications for this visit.       Facility-Administered Medications Ordered in Other Visits   Medication Dose Route Frequency Provider Last Rate Last Dose    cyclophosphamide (CYTOXAN) 1,120 mg in sodium chloride 0.9 % 250 mL chemo IVPB  750 mg/m2 (Treatment Plan Recorded) Intravenous Once Rosalba Osei MD        vinCRIStine (ONCOVIN) 2 mg in sodium chloride 0.9 % 50 mL chemo IVPB report. 1. No evidence of lymphadenopathy in the chest. 2. Findings in the upper abdomen will be described in a separate report. Signed by Dr Xiomara Napier on 3/16/2020 12:12 PM    Ct Abdomen Pelvis W Iv Contrast Additional Contrast? Oral    Result Date: 3/16/2020  CT ABDOMEN PELVIS W IV CONTRAST 3/16/2020 9:05 AM HISTORY: C83.00 COMPARISON: 8/14/2019. DLP: 241 mGy cm. In order to have a CT radiation dose as low as reasonably achievable, Automated Exposure Control was utilized for adjustment of the mA and/or KV according to patient size. TECHNIQUE: Following the oral ingestion and intravenous administration of contrast, helical CT tomographic images of the abdomen and pelvis were acquired. Multiplanar reformatted images were provided for review. FINDINGS: LOWER CHEST: Findings in the lower chest are described in a separate report. ABDOMEN: There has been marked interval progression of disease in the abdomen. A periportal lymph node measures 7.4 x 4.4 cm. This was previously not present. 4 new liver masses are visualized. The largest is in the lower 4th segment and measures 6.8 x 4.6 cm. These are new since the previous study. Associated intrahepatic biliary ductal dilation is present. The common bile duct is also dilated. There is a bilobed mass in the pancreas that measures up to 6.4 x 3.4 cm. This is also new since the previous study. The spleen and kidneys are unremarkable. The adrenal glands are normal. New adrenal and peripancreatic lymph nodes are seen in the left retroperitoneal space. No GI tract obstruction is seen. There is no wall thickening. PELVIS: There is been marked interval enlargement of a conglomerate of lymph nodes along the right pelvic sidewall. There is now encasement of the iliac and femoral arteries. This area demonstrates internal necrosis and measures 10.4 cm in length, 6.2 cm in width, and 12.8 cm in AP dimension.  Additionally, there is significant interval increase in size of

## 2020-04-30 NOTE — PROGRESS NOTES
pain, no nausea , no vomiting, no constipation; diarrhea  GABE: no dysuria, frequency and urgency, no hematuria, no kidney stones;   Musculoskeletal: no joint pain, swelling , stiffness; improving right  lower extremity edema   endocrine: no polyuria, polydypsia, no cold or heat intolerence; Hematology/lymphatic: no easy brusing or bleeding, no hx of clotting disorder;  Right/left inguinal adenopathy. Dermatology: skin lesion in the pubic area. No eczema, no pruritis;   Psychiatry: no depression, no anxiety,no panic attacks, no suicide ideation; Neurology: no syncope, no seizures, no numbness or tingling of hands, no numbness or tingling of feet, no paresis;     PHYSICAL EXAM:    Vitals signs:  BP (!) 150/80   Pulse 109   Temp 98.6 °F (37 °C)   Wt 108 lb (49 kg)   LMP  (LMP Unknown)   BMI 21.81 kg/m²   Pain scale:0  Pain Score:   0 - No pain   CONSTITUTIONAL: Alert, appropriate, no acute distress,   EYES: Non icteric, EOM intact, pupils equal round and reactive to light and accommodation. ENT: Oral mucus membranes moist, no oral pharyngeal lesions. External inspection of ears and nose are normal.   NECK: Supple, no masses. No palpable thyroid mass    CHEST/LUNGS: CTA bilaterally, normal respiratory effort   CARDIOVASCULAR: RRR, no murmurs. No lower extremity edema   ABDOMEN: soft non-tender, active bowel sounds, no hepatosplenomegaly. No palpable masses. EXTREMITIES: warm, Full ROM of all fours extremities. No focal weakness. Right lower extremity edema   SKIN: warm, dry with no rashes or lesions  LYMPH: No cervical, clavicular, axillary palpable right inguinal lymphadenopathy  NEUROLOGIC: follows commands, non focal.   PSYCH: mood and affect appropriate. Alert and oriented to time and place and person. Relevant Lab findings:  CBC: Reviewed and okay for treatment.       4/10/2020  LDH-705  BUN-6  Creatinine-0.30  Sodium-126  Potassium-4.2  Chloride-93  Total Protein-5.8  Alk

## 2020-05-21 NOTE — PROGRESS NOTES
Not on file     Active member of club or organization: Not on file     Attends meetings of clubs or organizations: Not on file     Relationship status: Not on file    Intimate partner violence     Fear of current or ex partner: Not on file     Emotionally abused: Not on file     Physically abused: Not on file     Forced sexual activity: Not on file   Other Topics Concern    Not on file   Social History Narrative    Not on file        Family history:   Family History   Problem Relation Age of Onset   Kat Palma Mother         not a smoker    Tuberculosis Father         Current Outpatient Medications   Medication Sig Dispense Refill    predniSONE (DELTASONE) 20 MG tablet Take 5 tablets by mouth daily for 5 days Take 100mg daily x 5 days starting the day of chemotherapy every 21 days 25 tablet 3    pantoprazole (PROTONIX) 20 MG tablet Take 1 tablet by mouth 2 times daily For 5 days when taking prednisone 60 tablet 2    lisinopril-hydroCHLOROthiazide (PRINZIDE;ZESTORETIC) 20-12.5 MG per tablet Take 1 tablet by mouth daily      allopurinol (ZYLOPRIM) 300 MG tablet Take 1 tablet by mouth daily 30 tablet 3    potassium chloride (KLOR-CON M) 20 MEQ extended release tablet Take 1 tablet by mouth 2 times daily 180 tablet 1     No current facility-administered medications for this visit.       Facility-Administered Medications Ordered in Other Visits   Medication Dose Route Frequency Provider Last Rate Last Dose    fosaprepitant (EMEND) 150 mg in sodium chloride 0.9 % 150 mL IVPB  150 mg Intravenous Once Amanda Fortune MD        palonosetron (ALOXI) injection 0.25 mg  0.25 mg Intravenous Once Amanda Fortune MD        dexamethasone (PF) (DECADRON) injection 10 mg  10 mg Intravenous Once Amanda Fortune MD        diphenhydrAMINE (BENADRYL) injection 25 mg  25 mg Intravenous Once Amanda Fortune MD        acetaminophen (TYLENOL) tablet 1,000 mg  1,000 mg Oral Once Amanda Fortune MD Unknown)   BMI 22.02 kg/m²   Pain scale:0      CONSTITUTIONAL: Alert, appropriate, no acute distress,   EYES: Non icteric, EOM intact, pupils equal round and reactive to light and accommodation. ENT: Oral mucus membranes moist, no oral pharyngeal lesions. External inspection of ears and nose are normal.   NECK: Supple, no masses. No palpable thyroid mass    CHEST/LUNGS: CTA bilaterally, normal respiratory effort   CARDIOVASCULAR: RRR, no murmurs. No lower extremity edema   ABDOMEN: soft non-tender, active bowel sounds, no hepatosplenomegaly. No palpable masses. EXTREMITIES: warm, Full ROM of all fours extremities. No focal weakness. Right lower extremity edema   SKIN: warm, dry with no rashes or lesions  LYMPH: No cervical, clavicular, axillary palpable right inguinal lymphadenopathy  NEUROLOGIC: follows commands, non focal.   PSYCH: mood and affect appropriate. Alert and oriented to time and place and person. Relevant Lab findings:  CBC:5/22/2020  WBC-3.23  HGB-8.9  PLT-117,000  Neut-2.77    5/1/2020  MAG-1.7  Phos-3.1  LDH-526  Uric Acid-1.7  BUN-9  Creatinine-0.40  Sodium-129  Chloride-96  Total Protein-6.1  Alk Phos-187  AST-46  ALTV-28      Relevant Imaging studies:  No results found. ASSESSMENT:    Orders Placed This Encounter   Procedures    CT ABDOMEN PELVIS W IV CONTRAST Additional Contrast? Oral     Standing Status:   Future     Standing Expiration Date:   5/22/2021     Order Specific Question:   Additional Contrast?     Answer:   Oral    CT CHEST W CONTRAST     Standing Status:   Future     Standing Expiration Date:   5/22/2021      Cleo Mijares was seen today for lymphoma.     Diagnoses and all orders for this visit:    Chemotherapy management, encounter for    Encounter for antineoplastic immunotherapy    Adverse effect of chemotherapy, subsequent encounter    Care plan discussed with patient    Diffuse large B-cell lymphoma, unspecified body region [facility])  -     CT ABDOMEN PELVIS W IV the plans of care with the patient. I answered all the questions to the patients satisfaction. (Please note that portions of this note were completed with a voice recognition program. Efforts were made to edit the dictations but occasionally words are mis-transcribed.)  Over 50% of the total visit time of 25 minutes in face to face encounter with the patient, out of which more than 50% of the time was spent in counseling patient or family and coordination of care. Counseling included but was not limited to time spent reviewing labs, imaging studies/ treatment plan and answering questions.

## 2020-06-11 NOTE — PROGRESS NOTES
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 deletion. The patient has a very high burden of disease initially with significant inguinal bilateral adenopathy. Initial biopsy was consistent with his SLL. The patient received 2 lines of therapy for SLL with poor response to include Imbruvica followed by Evia Sour and venetoclax. Therefore, a repeat biopsy was performed which was compatible with high-grade lymphoma. The patient was then recommended chemotherapy with R-CHOP regimen. She presents today for her third cycle. She reports that she had complete resolution of her right inguinal adenopathy. She has been tolerated treatment well with complaints of some fatigue. She had lost a significant amount of weight but apparently is gaining weight back. Her appetite has improved. Her mobility overall has improved as well. She has been taking salt tablets for hyponatremia. Otherwise, no other new complaints. Hematology history  Ms Leslye Yanez was first seen by me on 3/27/2019 referred by Dr. Sarai Chavez for a diagnosis of B-cell lymphoma. The patient had a history of a right inguinal adenopathy that was treated with antibiotic without resolution. Therefore a biopsy was performed. 12/13/2018-CT abdomen pelvis showed a moderately prominent right inguinal lymph node.  In addition, an oval solid nodule in the left labium/left vagina wall measures 2.5 x 1.6 cm.   2/20/2019-the patient was seen by Dr. Ludmila Asif, who requested an ultrasound of the pelvis. 2/28/2019-US pelvis showed a hypoechoic 2.3 x 2.1 x 2.1 cm area within the vagina concerning for Bartholin's cyst.   2/28/2019-she was seen by Dr. Sarai Chavez from General surgery with complains of enlarging right inguinal adenopathy, and therefore was recommended FNA biopsy   3/4/2019-FNA biopsy consistent B-cell lymphoma on morphology and IHC (positive for CD3, CD5 and CD20 and negative for BCL-2, BCL 6, cyclin D1, CD30 and MUM-1. Extensive necrosis, and therefore, further characterization was not possible. 3/27/2019-she was first seen by me. 4/3/2019-bone marrow biopsy/aspirate showed involvement by a cyclin D1 negative, CD5/CD23 positive monoclonal B-cell population consistent with involvement of bone marrow by B-cell SLL (10%). CD20 positive. Normal female karyotype 55 XX. FISH cytogenetics 13 q deletion   4/8/2019-CT chest showed no evidence of mediastinal, hilar adenopathy. 4/9/2019-PET scan showed FDG avid enlarged right pelvic and inguinal lymph nodes. Suggestive for active lymphoma. Specifically, a right obturator lymph node with SUV 23. Right external iliac lymph node with SUV 27. Right inguinal lymph node with SUV 11. May through November 2019-Imbruvica with progressive disease or poor tolerance. 12/3/2019- venetoclax/Gazyva fixed duration treatment 1 year expected. 3/16/2020-CT chest abdomen pelvis showed no evidence of lymphadenopathy in the chest. Marked interval progression of disease in the abdomen and pelvis. Lesions are seen in the liver, pancreas, periportal region, left retroperitoneal area, and bilateral inguinal chains. There is also enlargement of previously seen right pelvic lymph nodes. 3/17/2020- core biopsy of right inguinal lymph node consistent with high-grade lymphoma. Flow cytometry positive for CD20 and BCL 6 and negative for CD5.   BCL-2 mostly negative. Ki-67 95%. Findings consistent with high-grade B-cell lymphoma. 3/20/2020- recommended 6 cycles of R-CHOP.  5/29/2020- Ct Chest/Abdomen/Pelvis W Contrast A right cardiophrenic angle lymph node is smaller on the current study. There is no other lymphadenopathy in the chest.Scattered tiny subpleural pulmonary nodules are likely postinfectious or postinflammatory. They are stable in appearance. There are no new pulmonary nodules. Scattered tiny nodules in both thyroid lobes measuring up to 3 or 4 mm. Positive treatment response with decreased size of liver lesions and decrease in abdominopelvic lymphadenopathy.      Past medical history:  Past Medical History:   Diagnosis Date    Body mass index (bmi) 26.0-26.9, adult     Hyperlipidemia     Hypertension     Osteoporosis     Unspecified b-cell lymphoma, lymph nodes of inguinal region and lower limb ([facility] [address]         Past surgical history:  Past Surgical History:   Procedure Laterality Date    CHOLECYSTECTOMY      COLONOSCOPY  01/26/2009    Dr Cindy Castillo, TOTAL ABDOMINAL      LYMPH NODE BIOPSY Right 3/4/2019    EXCISION OF SUPERFICIAL LYMPH NODE, RIGHT GROIN performed by Kvng Dorado DO at [facility] [address] Right 3/31/2020    PUNCH BIOPSY OF PUBIS performed by Chava Tabor MD at [address] N/A 3/31/2020    PORT INSERTION WITH FLUORO performed by Chava Tabor MD at [address] history:  Social History     Socioeconomic History    Marital status:      Spouse name: Not on file    Number of children: Not on file    Years of education: Not on file    Highest education level: Not on file   Occupational History    Not on file   Social Needs    Financial resource strain: Not on file    Food insecurity     Worry: Not on file     Inability: Not on file    Transportation needs     Medical: Not on file     Non-medical: Not on file   Tobacco Use    Smoking status: Former Smoker oncology for RT both sites of disease  CMP and LDH  Recheck calcium and sodium levels today. RTC 3 weeks  Repeat PET scan after completion of 6 cycles. Return in about 3 weeks (around 7/3/2020) for See Dr. Laya Hopkins in [address]. Data Tracie Sanchez am scribing for Vanesa Arreaga MD. Electronically signed by Vijaya Lundy on 6/12/2020 at 5:23 PM.   I, Dr Josr Shoemaker, personally performed the services described in this documentation as scribed by Vijaya Lundy RN in my presence and is both accurate and complete. I have seen, examined and reviewed this patient medication list, appropriate labs and imaging studies. I reviewed relevant medical records and others physicians notes. I discussed the plans of care with the patient. I answered all the questions to the patients satisfaction. (Please note that portions of this note were completed with a voice recognition program. Efforts were made to edit the dictations but occasionally words are mis-transcribed.)  Over 50% of the total visit time of 25 minutes in face to face encounter with the patient, out of which more than 50% of the time was spent in counseling patient or family and coordination of care. Counseling included but was not limited to time spent reviewing labs, imaging studies/ treatment plan and answering questions.

## 2020-07-02 NOTE — PROGRESS NOTES
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 deletion. The patient has a very high burden of disease initially with significant inguinal bilateral adenopathy. Initial biopsy was consistent with his SLL. The patient received 2 lines of therapy for SLL with poor response to include Imbruvica followed by Rubia Sick and venetoclax. Therefore, a repeat biopsy was performed which was compatible with high-grade lymphoma. The patient was then recommended chemotherapy with R-CHOP regimen. She presents today for her third cycle. She reports that she had complete resolution of her right inguinal adenopathy. She has been tolerated treatment well with complaints of some fatigue. She had lost a significant amount of weight but apparently is gaining weight back. Her appetite has improved. Her mobility overall has improved as well. She has been taking salt tablets for hyponatremia. Otherwise, no other new complaints. Hematology history  Ms Riaz Napoles was first seen by me on 3/27/2019 referred by Dr. Gretchen Lara for a diagnosis of B-cell lymphoma. The patient had a history of a right inguinal adenopathy that was treated with antibiotic without resolution. Therefore a biopsy was performed. 12/13/2018-CT abdomen pelvis showed a moderately prominent right inguinal lymph node.  In addition, an oval solid nodule in the left labium/left vagina wall measures 2.5 x 1.6 cm.   2/20/2019-the patient was seen by Dr. Dio Carrero, who requested an ultrasound of the pelvis. 2/28/2019-US pelvis showed a hypoechoic 2.3 x 2.1 x 2.1 cm area within the vagina concerning for Bartholin's cyst.   2/28/2019-she was seen by Dr. Charly Dorman from General surgery with complains of enlarging right inguinal adenopathy, and therefore was recommended FNA biopsy   3/4/2019-FNA biopsy consistent B-cell lymphoma on morphology and IHC (positive for CD3, CD5 and CD20 and negative for BCL-2, BCL 6, cyclin D1, CD30 and MUM-1. Extensive necrosis, and therefore, further characterization was not possible. 3/27/2019-she was first seen by me. 4/3/2019-bone marrow biopsy/aspirate showed involvement by a cyclin D1 negative, CD5/CD23 positive monoclonal B-cell population consistent with involvement of bone marrow by B-cell SLL (10%). CD20 positive. Normal female karyotype 55 XX. FISH cytogenetics 13 q deletion   4/8/2019-CT chest showed no evidence of mediastinal, hilar adenopathy. 4/9/2019-PET scan showed FDG avid enlarged right pelvic and inguinal lymph nodes. Suggestive for active lymphoma. Specifically, a right obturator lymph node with SUV 23. Right external iliac lymph node with SUV 27. Right inguinal lymph node with SUV 11. May through November 2019-Imbruvica with progressive disease or poor tolerance. 12/3/2019- venetoclax/Gazyva fixed duration treatment 1 year expected. 3/16/2020-CT chest abdomen pelvis showed no evidence of lymphadenopathy in the chest. Marked interval progression of disease in the abdomen and pelvis. Lesions are seen in the liver, pancreas, periportal region, left retroperitoneal area, and bilateral inguinal chains. There is also enlargement of previously seen right pelvic lymph nodes. 3/17/2020- core biopsy of right inguinal lymph node consistent with high-grade lymphoma. Flow cytometry positive for CD20 and BCL 6 and negative for CD5.   BCL-2 mostly negative. Ki-67 95%. Findings consistent with high-grade B-cell lymphoma. 3/20/2020- recommended 6 cycles of R-CHOP.  5/29/2020- Ct Chest/Abdomen/Pelvis W Contrast A right cardiophrenic angle lymph node is smaller on the current study. There is no other lymphadenopathy in the chest.Scattered tiny subpleural pulmonary nodules are likely postinfectious or postinflammatory. They are stable in appearance. There are no new pulmonary nodules. Scattered tiny nodules in both thyroid lobes measuring up to 3 or 4 mm. Positive treatment response with decreased size of liver lesions and decrease in abdominopelvic lymphadenopathy.      Past medical history:  Past Medical History:   Diagnosis Date    Body mass index (bmi) 26.0-26.9, adult     Hyperlipidemia     Hypertension     Osteoporosis     Unspecified b-cell lymphoma, lymph nodes of inguinal region and lower limb ([facility] [address]         Past surgical history:  Past Surgical History:   Procedure Laterality Date    CHOLECYSTECTOMY      COLONOSCOPY  01/26/2009    Dr Huma Robles, TOTAL ABDOMINAL      LYMPH NODE BIOPSY Right 3/4/2019    EXCISION OF SUPERFICIAL LYMPH NODE, RIGHT GROIN [facility] Right 3/31/2020    PUNCH BIOPSY OF PUBIS performed by Juan Jain MD at [address] [place] [address] N/A 3/31/2020    PORT INSERTION WITH FLUORO performed by Juan Jain MD at [place] [address] history:  Social History     Socioeconomic History    Marital status:      Spouse name: Not on file    Number of children: Not on file    Years of education: Not on file    Highest education level: Not on file   Occupational History    Not on file   Social Needs    Financial resource strain: Not on file    Food insecurity     Worry: Not on file     Inability: Not on file    Transportation needs     Medical: Not on file     Non-medical: Not on file   Tobacco Use    Smoking status: Former Smoker Constitutional: no fever, no night sweats, no fatigue, weight loss  HEENT: no blurring of vision, no double vision, no hearing difficulty, no tinnitus,no ulceration, no dental caries, no dysphagia  Lungs: no cough, no shortness of breath, no wheeze;   CVS: no palpitation, no chest pain, no shortness of breath;  GI: no abdominal pain, no nausea , no vomiting, no constipation; diarrhea  GABE: no dysuria, frequency and urgency, no hematuria, no kidney stones;   Musculoskeletal: no joint pain, swelling , stiffness;   endocrine: no polyuria, polydypsia, no cold or heat intolerence; Hematology/lymphatic: no easy brusing or bleeding, no hx of clotting disorder;  Right/left inguinal adenopathy. Dermatology: skin lesion in the pubic area. No eczema, no pruritis;   Psychiatry: no depression, no anxiety,no panic attacks, no suicide ideation; Neurology: no syncope, no seizures, numbness or tingling of hands, no numbness or tingling of feet, no paresis;     PHYSICAL EXAM:    Vitals signs:  /84   Pulse 104   Temp 99.2 °F (37.3 °C)   Wt 108 lb 12.8 oz (49.4 kg)   LMP  (LMP Unknown)   SpO2 100%   BMI 21.97 kg/m²   Pain scale:0      CONSTITUTIONAL: Alert, appropriate, no acute distress,   EYES: Non icteric, EOM intact, pupils equal round and reactive to light and accommodation. ENT: Oral mucus membranes moist, no oral pharyngeal lesions. External inspection of ears and nose are normal.   NECK: Supple, no masses. No palpable thyroid mass    CHEST/LUNGS: CTA bilaterally, normal respiratory effort   CARDIOVASCULAR: RRR, no murmurs. No lower extremity edema   ABDOMEN: soft non-tender, active bowel sounds, no hepatosplenomegaly. No palpable masses. EXTREMITIES: warm, Full ROM of all fours extremities. No focal weakness. SKIN: warm, dry with no rashes or lesions  LYMPH: No cervical, clavicular, axillary lymphadenopathy  NEUROLOGIC: follows commands, non focal.   PSYCH: mood and affect appropriate.  Alert and

## 2020-07-13 NOTE — TELEPHONE ENCOUNTER
PT CALLED THINGS SHE HAS STREP THROAT SHE HAS WHITE PARCHES IN HER THROAT  JUST PASSED AWAY YESTERDAY CANT COME IN AND BE SEEN

## 2020-07-13 NOTE — TELEPHONE ENCOUNTER
Pt left VM requesting an antibiotic be called in for poss strep throat. Return call to pt with VM left informing pt she needs to be seen by PCP or walk-in clinic or ER before an antibiotic can be given.

## 2020-07-13 NOTE — TELEPHONE ENCOUNTER
Per Dr. Jl Mak it is ok to send a Garcia Dk in given the current circumstances  With her  just passing yesterday, pt is having to make  arrangements and can not come in right now, explained to pt that if she does not seem to feel better by tomorrow that we will need to see her to better evaluate her symptoms.

## 2020-07-15 NOTE — PROGRESS NOTES
Patient came to office for CBC and nurse eval for c/o white patches in mouth and throat. Plts 8,000. WBCs 0.54, neutrophils 0.11. Discussed with Dr. Jenna Riggs. To receive 1 unit of platelets tomorrow at [address] [facility]. Prescriptions for nystatin and Levaquin sent to [address] Instructed patient to discontinue taking the azithromycin previously prescrobed by her PCP. Patient and daughter instructed of all the above and verbalized understanding.

## 2020-07-15 NOTE — TELEPHONE ENCOUNTER
Pt called on 7/13 with c/o poss strep throat. Pt called PCP who ordered ZPack, but didn't see pt. Pt calls today c/o cont sore throat and \"lips/mouth are swollen\". She says the inside of her mouth is white. She wants to come in to see someone in our office.  Appt sched today for CBC & nurse hermelinda.

## 2020-08-14 NOTE — PROGRESS NOTES
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 deletion. The patient has a very high burden of disease initially with significant inguinal bilateral adenopathy. Initial biopsy was consistent with his SLL. The patient received 2 lines of therapy for SLL with poor response to include Imbruvica followed by Elvan Jetty and venetoclax. Therefore, a repeat biopsy was performed which was compatible with high-grade lymphoma. The patient was then recommended chemotherapy with R-CHOP regimen and has completed 6 cycles of treatment. She had a CT scan and PET scan performed here to discuss results. She has gained weight since finished treatment. She has been feeling much better. Her mobility has improved significantly. She is feels stronger. Hematology history  Ms German Adams was first seen by me on 3/27/2019 referred by Dr. Felicity Mahan for a diagnosis of B-cell lymphoma. The patient had a history of a right inguinal adenopathy that was treated with antibiotic without resolution. Therefore a biopsy was performed. 12/13/2018-CT abdomen pelvis showed a moderately prominent right inguinal lymph node.  In addition, an oval solid nodule in the left labium/left vagina wall measures 2.5 x 1.6 cm.   2/20/2019-the patient was seen by Dr. Marielos Swann, who requested an ultrasound of the pelvis. 2/28/2019-US pelvis showed a hypoechoic 2.3 x 2.1 x 2.1 cm area within the vagina concerning for Bartholin's cyst.   2/28/2019-she was seen by Dr. Selena Ochoa from General surgery with complains of enlarging right inguinal adenopathy, and therefore was recommended FNA biopsy   3/4/2019-FNA biopsy consistent B-cell lymphoma on morphology and IHC (positive for CD3, CD5 and CD20 and negative for BCL-2, BCL 6, cyclin D1, CD30 and MUM-1. Extensive necrosis, and therefore, further characterization was not possible. 3/27/2019-she was first seen by me. 4/3/2019-bone marrow biopsy/aspirate showed involvement by a cyclin D1 negative, CD5/CD23 positive monoclonal B-cell population consistent with involvement of bone marrow by B-cell SLL (10%). CD20 positive. Normal female karyotype 55 XX. FISH cytogenetics 13 q deletion   4/8/2019-CT chest showed no evidence of mediastinal, hilar adenopathy. 4/9/2019-PET scan showed FDG avid enlarged right pelvic and inguinal lymph nodes. Suggestive for active lymphoma. Specifically, a right obturator lymph node with SUV 23. Right external iliac lymph node with SUV 27. Right inguinal lymph node with SUV 11. May through November 2019-Imbruvica with progressive disease or poor tolerance. 12/3/2019- venetoclax/Gazyva fixed duration treatment 1 year expected. 3/16/2020-CT chest abdomen pelvis showed no evidence of lymphadenopathy in the chest. Marked interval progression of disease in the abdomen and pelvis. Lesions are seen in the liver, pancreas, periportal region, left retroperitoneal area, and bilateral inguinal chains. There is also enlargement of previously seen right pelvic lymph nodes. 3/17/2020- core biopsy of right inguinal lymph node consistent with high-grade lymphoma. Flow cytometry positive for CD20 and BCL 6 and negative for CD5. BCL-2 mostly negative. Ki-67 95%.   Findings consistent with high-grade B-cell lymphoma. 3/20/2020- recommended 6 cycles of R-CHOP.  5/29/2020- Ct Chest/Abdomen/Pelvis W Contrast A right cardiophrenic angle lymph node is smaller on the current study. There is no other lymphadenopathy in the chest.Scattered tiny subpleural pulmonary nodules are likely postinfectious or postinflammatory. They are stable in appearance. There are no new pulmonary nodules. Scattered tiny nodules in both thyroid lobes measuring up to 3 or 4 mm. Positive treatment response with decreased size of liver lesions and decrease in abdominopelvic lymphadenopathy. 7/20/2020 CT Chest/Abdomen/Pelvis-The decrease in size of an abnormal right cardiophrenic sulcus lymph node since the previous study. No other abnormal lymph node in the mediastinum and marcelo are noted. Several scattered tiny subpleural nodules in both lungs, more numerous in the right upper lung are stable since the previous study and may represent a benign process. Continued decrease in liver lesions and lymphadenopathy. Findings are consistent with continued treatment response when compared to CT from 5/29/2020. Mild RIGHT hydronephrosis, likely related to ureteral compression by pelvic adenopathy. 7/20/2020 Pet Ct Skull Base To Mid Thigh- There are hypermetabolic liver lesions as well hypermetabolic partially calcified lymph nodes within the periportal region and the right pelvic wall as well as bilateral inguinal regions. The greatest intensity of uptake is along the lateral margin of the enlarged right common femoral lymph node. Please refer to dictation above. Findings represent progression compared to the previous PET exam of 4/19/2019. Please refer to diagnostic CT exams from today to compare to recent CT studies of 5/29/2020. Moderate right-sided hydronephrosis secondary to the lymph node mass within the right lower pelvis. Focus of increased FDG uptake of the right perineum/labia.  This may be urinary contamination, however clinical assessment of the region recommended to exclude a soft tissue nodule. 2020-discussed the results of her CT scan/PET scan. Overall interval improvement on her CT scans but persistent SUV. I believe that the persistent SUV may be related to a low-grade component. Therefore I have recommended maintenance rituximab subcutaneously every 2 months x12 cycles. Past medical history:  Past Medical History:   Diagnosis Date    Body mass index (bmi) 26.0-26.9, adult     Hyperlipidemia     Hypertension     Osteoporosis     Unspecified b-cell lymphoma, lymph nodes of inguinal region and lower limb [facility]         Past surgical history:  Past Surgical History:   Procedure Laterality Date    CHOLECYSTECTOMY      COLONOSCOPY  2009    Dr Edith Tom, [facility] Right 3/4/2019    EXCISION OF SUPERFICIAL LYMPH NODE, RIGHT GROIN performed by Ligia Kirby DO at [facility]. Right 3/31/2020    PUNCH BIOPSY OF PUBIS performed by Marco Antonio Leyva MD at [address] N/A 3/31/2020    PORT INSERTION WITH FLUORO performed by Marco Antonio Leyva MD at [address] history:  Social History     Socioeconomic History    Marital status:       Spouse name: Not on file    Number of children: Not on file    Years of education: Not on file    Highest education level: Not on file   Occupational History    Not on file   Social Needs    Financial resource strain: Not on file    Food insecurity     Worry: Not on file     Inability: Not on file    Transportation needs     Medical: Not on file     Non-medical: Not on file   Tobacco Use    Smoking status: Former Smoker     Packs/day: 1.00     Years: 2.00     Pack years: 2.00     Types: Cigarettes     Start date: 65     Last attempt to quit:      Years since quittin.6    Smokeless tobacco: Never Used    Tobacco comment: Retired from being a  and [address] [place] [address]   Substance and Sexual Activity urgency, no hematuria, no kidney stones;   Musculoskeletal: no joint pain, swelling , stiffness;   endocrine: no polyuria, polydypsia, no cold or heat intolerence; Hematology/lymphatic: no easy brusing or bleeding, no hx of clotting disorder;  Right/left inguinal adenopathy. Dermatology: skin lesion in the pubic area. No eczema, no pruritis;   Psychiatry: no depression, no anxiety,no panic attacks, no suicide ideation; Neurology: no syncope, no seizures, numbness or tingling of hands, no numbness or tingling of feet, no paresis;     PHYSICAL EXAM:    Vitals signs:  BP (!) 140/70   Pulse 111   Temp 97.1 °F (36.2 °C)   Ht 4' 11\" (1.499 m)   Wt 123 lb 3.2 oz (55.9 kg)   LMP  (LMP Unknown)   SpO2 98%   BMI 24.88 kg/m²   Pain scale:0  Pain Score:   0 - No pain   CONSTITUTIONAL: Alert, appropriate, no acute distress,   EYES: Non icteric, EOM intact, pupils equal round and reactive to light and accommodation. ENT: Oral mucus membranes moist, no oral pharyngeal lesions. External inspection of ears and nose are normal.   NECK: Supple, no masses. No palpable thyroid mass    CHEST/LUNGS: CTA bilaterally, normal respiratory effort   CARDIOVASCULAR: RRR, no murmurs. No lower extremity edema   ABDOMEN: soft non-tender, active bowel sounds, no hepatosplenomegaly. No palpable masses. EXTREMITIES: warm, Full ROM of all fours extremities. No focal weakness. SKIN: warm, dry with no rashes or lesions  LYMPH: No cervical, clavicular, axillary lymphadenopathy  NEUROLOGIC: follows commands, non focal.   PSYCH: mood and affect appropriate. Alert and oriented to time and place and person.     Relevant Lab findings:  Lab Results   Component Value Date    WBC 5.99 08/17/2020    HGB 10.4 (L) 08/17/2020    HCT 32.4 (L) 08/17/2020    .2 (H) 08/17/2020    PLT 75 (L) 08/17/2020     Lab Results   Component Value Date    NEUTROABS 3.70 08/17/2020     Lab Results   Component Value Date     08/17/2020    K 3.9 08/17/2020 perineum/labia. This may be urinary contamination, however clinical assessment of the region recommended to exclude a soft tissue nodule. Signed by Dr Lisa Gaming on 7/20/2020 3:43 PM    ASSESSMENT:    Orders Placed This Encounter   Procedures    Comprehensive Metabolic Panel     Standing Status:   Future     Number of Occurrences:   1     Standing Expiration Date:   8/17/2021    Lactate Dehydrogenase     Standing Status:   Future     Number of Occurrences:   1     Standing Expiration Date:   8/17/2021      Arielle Siddiqui was seen today for lymphoma. Diagnoses and all orders for this visit:    Diffuse large B-cell lymphoma, unspecified body region [facility])  -     Comprehensive Metabolic Panel; Future  -     Lactate Dehydrogenase; Future    Encounter for antineoplastic immunotherapy    Care plan discussed with patient    Coordination of complex care       #High-grade Lymphoma-DLBCL  Ritcher's transformation- biopsy consistent with high-grade B-cell lymphoma  She received 6 cycles of R-CHOP completed 7/06/2020  CT scan showed interval response to treatment. PET scan showed persistent uptake in the liver. The patient has clinically gained weight and feels much better. Recommended maintenance with subcutaneous rituximab every 2 months x12 doses. The reason for this recommendation stands for the fact that the patient has an underlying low-grade lymphoma as well. #B-SLL stage IV 13 q deletion   Essentially, the patient has B-cell lymphoma SLL with symptomatic lymphadenopathy. No response to frontline Imbruvica or Meriel Hoar. The patient Ricther's transformation. Therefore consistent with high-grade lymphoma. She has completed 6 cycles of R-CHOP and has been recommended maintenance with rituximab based on her prior B-cell SLL component. #Treatment related toxicity- fatigue.   Bone marrow suppression, thrombocytopenia    #Right lower extremity edema-likely secondary to compressive inguinal adenopathy/Baker's cyst on the right. No signs of DVT on prior vascular study 6/18/2019. She continued to have significant leg swelling in the right lower extremity. #Hypertension-uncontrolled. Follow-up with PCP. #Hypercalcemia- calcium 11.4. The patient had an episode of hypercalcemia in the past.  S/p Zometa 4 mg x 1 dose. Calcium levels 9.9 6/12/2020    #Transaminitis- 2/2 lymphoma. #Macrocytic anemia-secondary to chemotherapy. Hemoglobin 10.4. Improving. MCV has not    #Thrombocytopenia-secondary to chemotherapy. Improving. Plan:  Anticipate maintenance therapy with Rituxan subcuatneous every 2 months x 2 years  CMP and LDH today  Stop allopurinol and potassium chloride  RTC 3 weeks in treatment room          Return in about 3 weeks (around 9/7/2020) for treatment and see Maritza Celis in [address] [place] [address]. Rituxan hycela subcutaneous  Port flush same day     IMary, thao scribing for Vj Buchanan MD. Electronically signed by Mary William on 8/17/2020 at 5:23 PM.   I, Dr Edwin Grove, personally performed the services described in this documentation as scribed by Mary William RN in my presence and is both accurate and complete. I have seen, examined and reviewed this patient medication list, appropriate labs and imaging studies. I reviewed relevant medical records and others physicians notes. I discussed the plans of care with the patient. I answered all the questions to the patients satisfaction. (Please note that portions of this note were completed with a voice recognition program. Efforts were made to edit the dictations but occasionally words are mis-transcribed.)  Over 50% of the total visit time of 25 minutes in face to face encounter with the patient, out of which more than 50% of the time was spent in counseling patient or family and coordination of care.  Counseling included but was not limited to time spent reviewing labs, imaging studies/ treatment plan and answering questions.

## 2020-08-17 PROBLEM — C83.30 DIFFUSE LARGE B-CELL LYMPHOMA (HCC): Status: ACTIVE | Noted: 2020-01-01

## 2020-08-17 NOTE — PROGRESS NOTES
Rituxan Hycela:    Maintenance: SubQ: In patients with complete or partial response, initiate rituximab 1,400 mg/hyaluronidase 23,400 units (fixed dose) once every 8 weeks for 12 doses Ketty Caldwell 2017). Maintenance treatment should be initiated 8 weeks following completion of initial combination chemotherapy treatment.

## 2020-09-09 NOTE — PROGRESS NOTES
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 deletion. The patient has a very high burden of disease initially with significant inguinal bilateral adenopathy. Initial biopsy was consistent with his SLL. The patient received 2 lines of therapy for SLL with poor response to include Imbruvica followed by Primus Hash and venetoclax. Therefore, a repeat biopsy was performed which was compatible with high-grade lymphoma. The patient was then recommended chemotherapy with R-CHOP regimen and has completed 6 cycles of treatment. She had a CT scan and PET scan performed here to discuss results. She has gained more weight since finished treatment. She has been feeling much better. Her mobility has improved significantly. She is feels stronger. PET CT scan showed mixed results. At this point will continue with maintenance rituximab. She has felt much better. She denies abdominal pain. She continues to complain of right lower extremity swelling. Hematology history  Ms Robbie Alanis was first seen by me on 3/27/2019 referred by Dr. Selena Ochoa for a diagnosis of B-cell lymphoma. The patient had a history of a right inguinal adenopathy that was treated with antibiotic without resolution. Therefore a biopsy was performed.   12/13/2018-CT abdomen pelvis showed a moderately prominent right inguinal lymph node. In addition, an oval solid nodule in the left labium/left vagina wall measures 2.5 x 1.6 cm.   2/20/2019-the patient was seen by Dr. Natali Bellamy, who requested an ultrasound of the pelvis. 2/28/2019-US pelvis showed a hypoechoic 2.3 x 2.1 x 2.1 cm area within the vagina concerning for Bartholin's cyst.   2/28/2019-she was seen by Dr. Maciel from General surgery with complains of enlarging right inguinal adenopathy, and therefore was recommended FNA biopsy   3/4/2019-FNA biopsy consistent B-cell lymphoma on morphology and IHC (positive for CD3, CD5 and CD20 and negative for BCL-2, BCL 6, cyclin D1, CD30 and MUM-1. Extensive necrosis, and therefore, further characterization was not possible. 3/27/2019-she was first seen by me. 4/3/2019-bone marrow biopsy/aspirate showed involvement by a cyclin D1 negative, CD5/CD23 positive monoclonal B-cell population consistent with involvement of bone marrow by B-cell SLL (10%). CD20 positive. Normal female karyotype 55 XX. FISH cytogenetics 13 q deletion   4/8/2019-CT chest showed no evidence of mediastinal, hilar adenopathy. 4/9/2019-PET scan showed FDG avid enlarged right pelvic and inguinal lymph nodes. Suggestive for active lymphoma. Specifically, a right obturator lymph node with SUV 23. Right external iliac lymph node with SUV 27. Right inguinal lymph node with SUV 11. May through November 2019-Imbruvica with progressive disease or poor tolerance. 12/3/2019- venetoclax/Gazyva fixed duration treatment 1 year expected. 3/16/2020-CT chest abdomen pelvis showed no evidence of lymphadenopathy in the chest. Marked interval progression of disease in the abdomen and pelvis. Lesions are seen in the liver, pancreas, periportal region, left retroperitoneal area, and bilateral inguinal chains. There is also enlargement of previously seen right pelvic lymph nodes.   3/17/2020- core biopsy of right inguinal lymph node consistent with high-grade lymphoma. Flow cytometry positive for CD20 and BCL 6 and negative for CD5. BCL-2 mostly negative. Ki-67 95%. Findings consistent with high-grade B-cell lymphoma. 3/20/2020- recommended 6 cycles of R-CHOP.  5/29/2020- Ct Chest/Abdomen/Pelvis W Contrast A right cardiophrenic angle lymph node is smaller on the current study. There is no other lymphadenopathy in the chest.Scattered tiny subpleural pulmonary nodules are likely postinfectious or postinflammatory. They are stable in appearance. There are no new pulmonary nodules. Scattered tiny nodules in both thyroid lobes measuring up to 3 or 4 mm. Positive treatment response with decreased size of liver lesions and decrease in abdominopelvic lymphadenopathy. 7/20/2020 CT Chest/Abdomen/Pelvis-The decrease in size of an abnormal right cardiophrenic sulcus lymph node since the previous study. No other abnormal lymph node in the mediastinum and marcelo are noted. Several scattered tiny subpleural nodules in both lungs, more numerous in the right upper lung are stable since the previous study and may represent a benign process. Continued decrease in liver lesions and lymphadenopathy. Findings are consistent with continued treatment response when compared to CT from 5/29/2020. Mild RIGHT hydronephrosis, likely related to ureteral compression by pelvic adenopathy. 7/20/2020 Pet Ct Skull Base To Mid Thigh- There are hypermetabolic liver lesions as well hypermetabolic partially calcified lymph nodes within the periportal region and the right pelvic wall as well as bilateral inguinal regions. The greatest intensity of uptake is along the lateral margin of the enlarged right common femoral lymph node. Please refer to dictation above. Findings represent progression compared to the previous PET exam of 4/19/2019. Please refer to diagnostic CT exams from today to compare to recent CT studies of 5/29/2020.  Moderate right-sided hydronephrosis secondary to the lymph node mass within the right lower pelvis. Focus of increased FDG uptake of the right perineum/labia. This may be urinary contamination, however clinical assessment of the region recommended to exclude a soft tissue nodule. 8/17/2020-discussed the results of her CT scan/PET scan. Overall interval improvement on her CT scans but persistent SUV. I believe that the persistent SUV may be related to a low-grade component. Therefore I have recommended maintenance rituximab subcutaneously every 2 months x12 cycles. 9/10/2020- maintenance rituximab subcutaneous x2 years    Past medical history:  Past Medical History:   Diagnosis Date    Body mass index (bmi) 26.0-26.9, adult     Hyperlipidemia     Hypertension     Osteoporosis     Unspecified b-cell lymphoma, lymph nodes of inguinal region and lower limb ([facility] [address]         Past surgical history:  Past Surgical History:   Procedure Laterality Date    CHOLECYSTECTOMY      COLONOSCOPY  01/26/2009    Dr Huma Robles, [facility] Right 3/4/2019    EXCISION OF SUPERFICIAL LYMPH NODE, RIGHT GROIN performed by Shannan Moreno DO at [facility] [address] Right 3/31/2020    PUNCH BIOPSY OF PUBIS performed by Juan Jain MD at [address] N/A 3/31/2020    PORT INSERTION WITH FLUORO performed by Juan Jain MD at [address] history:  Social History     Socioeconomic History    Marital status:       Spouse name: Not on file    Number of children: Not on file    Years of education: Not on file    Highest education level: Not on file   Occupational History    Not on file   Social Needs    Financial resource strain: Not on file    Food insecurity     Worry: Not on file     Inability: Not on file    Transportation needs     Medical: Not on file     Non-medical: Not on file   Tobacco Use    Smoking status: Former Smoker     Packs/day: 1.00     Years: 2.00     Pack years: 2.00 Types: Cigarettes     Start date: 65     Last attempt to quit:      Years since quittin.7    Smokeless tobacco: Never Used    Tobacco comment: Retired from being a  and [id number]   Substance and Sexual Activity    Alcohol use: No    Drug use: No    Sexual activity: Never   Lifestyle    Physical activity     Days per week: Not on file     Minutes per session: Not on file    Stress: Not on file   Relationships    Social connections     Talks on phone: Not on file     Gets together: Not on file     Attends Cheondoism service: Not on file     Active member of club or organization: Not on file     Attends meetings of clubs or organizations: Not on file     Relationship status: Not on file    Intimate partner violence     Fear of current or ex partner: Not on file     Emotionally abused: Not on file     Physically abused: Not on file     Forced sexual activity: Not on file   Other Topics Concern    Not on file   Social History Narrative    Not on file        Family history:   Family History   Problem Relation Age of Onset    Lung Cancer Mother         not a smoker    Tuberculosis Father         Current Outpatient Medications   Medication Sig Dispense Refill    megestrol (MEGACE) 40 MG/ML suspension Take 10 mLs by mouth daily 600 mL 1    megestrol (MEGACE) 40 MG/ML suspension Take 5 mLs by mouth daily 240 mL 3    lisinopril-hydroCHLOROthiazide (PRINZIDE;ZESTORETIC) 20-12.5 MG per tablet Take 1 tablet by mouth daily      allopurinol (ZYLOPRIM) 300 MG tablet Take 1 tablet by mouth daily 30 tablet 3    potassium chloride (KLOR-CON M) 20 MEQ extended release tablet Take 1 tablet by mouth 2 times daily 180 tablet 1     No current facility-administered medications for this visit.       Facility-Administered Medications Ordered in Other Visits   Medication Dose Route Frequency Provider Last Rate Last Dose    sodium chloride flush 0.9 % injection 20 mL  20 mL Intravenous PRN Sergo ZACARIAS MD Gerri        heparin flush 100 UNIT/ML injection 500 Units  500 Units Intracatheter PRN Teagan Garvin MD            REVIEW OF SYSTEMS:    Constitutional: no fever, no night sweats, no fatigue, weight gain  HEENT: no blurring of vision, no double vision, no hearing difficulty, no tinnitus,no ulceration, no dental caries, no dysphagia  Lungs: no cough, no shortness of breath, no wheeze;   CVS: no palpitation, no chest pain, no shortness of breath;  GI: no abdominal pain, no nausea , no vomiting, no constipation; diarrhea  GABE: no dysuria, frequency and urgency, no hematuria, no kidney stones;   Musculoskeletal: no joint pain, swelling , stiffness;   endocrine: no polyuria, polydypsia, no cold or heat intolerence; Hematology/lymphatic: no easy brusing or bleeding, no hx of clotting disorder;  Right/left inguinal adenopathy. Dermatology: skin lesion in the pubic area. No eczema, no pruritis;   Psychiatry: no depression, no anxiety,no panic attacks, no suicide ideation; Neurology: no syncope, no seizures, numbness or tingling of hands, no numbness or tingling of feet, no paresis;     PHYSICAL EXAM:    Vitals signs:  /78   Pulse 105   Temp 98.2 °F (36.8 °C)   Wt 125 lb (56.7 kg)   LMP  (LMP Unknown)   SpO2 97%   BMI 25.25 kg/m²   Pain scale:0      CONSTITUTIONAL: Alert, appropriate, no acute distress,   EYES: Non icteric, EOM intact, pupils equal round and reactive to light and accommodation. ENT: Oral mucus membranes moist, no oral pharyngeal lesions. External inspection of ears and nose are normal.   NECK: Supple, no masses. No palpable thyroid mass    CHEST/LUNGS: CTA bilaterally, normal respiratory effort   CARDIOVASCULAR: RRR, no murmurs. No lower extremity edema   ABDOMEN: soft non-tender, active bowel sounds, no hepatosplenomegaly. No palpable masses. EXTREMITIES: warm, Full ROM of all fours extremities. No focal weakness.    SKIN: warm, dry with no rashes or lesions  LYMPH: No cervical, clavicular, axillary lymphadenopathy  NEUROLOGIC: follows commands, non focal.   PSYCH: mood and affect appropriate. Alert and oriented to time and place and person. Relevant Lab findings:  Lab Results   Component Value Date     09/08/2020    K 3.6 09/08/2020     09/08/2020    CO2 23 09/08/2020    BUN 17 09/08/2020    CREATININE 0.4 (L) 09/08/2020    GLUCOSE 85 09/08/2020    CALCIUM 10.4 (H) 09/08/2020    PROT 7.2 09/08/2020    LABALBU 4.3 09/08/2020    BILITOT 0.5 09/08/2020    ALKPHOS 135 (H) 09/08/2020    AST 40 (H) 09/08/2020    ALT 44 (H) 09/08/2020    LABGLOM >60 09/08/2020    GFRAA >59 09/08/2020    AGRATIO 1.5 01/15/2020    GLOB 8.0 08/17/2020 8/17/2020   LDH: 665    Relevant Imaging studies:  No results found. ASSESSMENT:    Orders Placed This Encounter   Procedures    Lactate Dehydrogenase     Standing Status:   Future     Standing Expiration Date:   9/10/2021      Amy Bajwa was seen today for cancer. Diagnoses and all orders for this visit:    Diffuse large B-cell lymphoma, unspecified body region ([facility] [address]  -     Lactate Dehydrogenase; Future       #High-grade Lymphoma-DLBCL  Ritcher's transformation- biopsy consistent with high-grade B-cell lymphoma  She received 6 cycles of R-CHOP completed 7/06/2020  CT scan showed interval response to treatment. PET scan showed persistent uptake in the liver. The patient has clinically gained weight and feels much better. Recommended maintenance with subcutaneous rituximab every 2 months x12 doses. The reason for this recommendation stands for the fact that the patient has an underlying low-grade lymphoma as well. #B-SLL stage IV 13 q deletion   Essentially, the patient has B-cell lymphoma SLL with symptomatic lymphadenopathy. No response to frontline Imbruvica or Marilyne Verónica. The patient Ricther's transformation. Therefore consistent with high-grade lymphoma.   She has completed 6 cycles of R-CHOP and has been recommended maintenance with rituximab based on her prior B-cell SLL component. #Treatment related toxicity- fatigue. Bone marrow suppression, thrombocytopenia    #Right lower extremity edema-likely secondary to compressive inguinal adenopathy/Baker's cyst on the right. No signs of DVT on prior vascular study 6/18/2019. She continued to have significant leg swelling in the right lower extremity. #Hypertension-controlled. Follow-up with PCP. #Hypercalcemia- calcium 10.4. The patient had an episode of hypercalcemia in the past.  S/p Zometa 4 mg x 1 dose. Calcium levels 10.4 on 9/18/2020    #Transaminitis- 2/2 lymphoma. Improved    #Macrocytic anemia-secondary to chemotherapy. Hemoglobin 10.4. Improving. MCV has not    #Thrombocytopenia-secondary to chemotherapy. 54,000. Plan:  · Begin C1 of Rituxan Hycela subq  CMP and LDH today  Stop allopurinol and potassium chloride  RTC 2-month   CT abdomen before next visit   Referred to lymphedema clinic       Return in about 8 weeks (around 11/5/2020), or Dr. Gui Mcdonald in tx room for [facility]. Data Ele Mcdonald, am scribing for Cass Augustin MD. Electronically signed by Oliva Suresh on 9/10/2020 at 5:23 PM.   I, Dr Eliel Wyatt, personally performed the services described in this documentation as scribed by Oliva Suresh RN in my presence and is both accurate and complete. I have seen, examined and reviewed this patient medication list, appropriate labs and imaging studies. I reviewed relevant medical records and others physicians notes. I discussed the plans of care with the patient. I answered all the questions to the patients satisfaction.     (Please note that portions of this note were completed with a voice recognition program. Efforts were made to edit the dictations but occasionally words are mis-transcribed.)  Over 50% of the total visit time of 25 minutes in face to face encounter with the patient, out of which more than 50% of the time was spent in counseling patient or family and coordination of care. Counseling included but was not limited to time spent reviewing labs, imaging studies/ treatment plan and answering questions.

## 2020-09-10 PROBLEM — I87.8 POOR VENOUS ACCESS: Status: ACTIVE | Noted: 2020-01-01

## 2020-09-11 ENCOUNTER — TRANSCRIBE ORDERS (OUTPATIENT)
Dept: PHYSICAL THERAPY | Facility: CLINIC | Age: 75
End: 2020-09-11

## 2020-09-11 DIAGNOSIS — I89.0 LYMPHEDEMA OF RIGHT LOWER EXTREMITY: Primary | ICD-10-CM

## 2020-09-16 NOTE — PROGRESS NOTES
Chief Complaint:   María Mayo is a 76 y.o. female who presents forcomplete physical exam.    History of Present Illness:      Patient is here for  [address] VISIT     CARE TEAM:  Dr Ashlie Chino opthalmology  Dr Gui Mcdonald oncology  Dr Swati Milton surgery    PT Jackson Biggs    _____________________________________________________________________    Pt presents today for follow-up and management of following chronic medicalconditions:    Diffuse large B-cell lymphoma  High-grade lymphoma  Treatment related toxicity resulting in fatigue, and bone marrow suppression  Right lower extremity edema likely secondary to compressive inguinal adenopathy/ongoing for over a year and no signs of DVT on previous vascular test  Patient has been referred to vascular specialist by Dr. Jud Estrada hypertension  - States her blood pressure has been in good range/she checks it regularly at home     Pure hypercholesterolemia-   Has not been taking her cholesterol medication since starting her chemotherapy    IFG (impaired fasting glucose)-      Localized osteoporosis without current pathological fracture- Fosamax was discontinued by oncology, she remains on Miacalcin spray   Patient Active Problem List    Diagnosis Date Noted    Poor venous access 09/10/2020    Diffuse large B-cell lymphoma ([address] 08/17/2020    Antineoplastic chemotherapy induced pancytopenia (CODE) ([address]      Hypercalcemia      Severe malnutrition ([address] 02/28/2020    Hypercalcemia of malignancy 02/27/2020    Dysphagia 02/27/2020    Unspecified b-cell lymphoma, lymph nodes of inguinal region and lower limb ([address]     Body mass index (bmi) 26.0-26.9, adult     Lymphoma ([address] 06/06/2019     Stage 4( bone marrow involvement)/ 13q deletion      Lymphadenopathy, inguinal 02/28/2019    Localized osteoporosis without current pathological fracture 09/09/2017     3/15 Lspine -3.3; hip-0.9   2017 Lspine -3.6/ fem neck -2.1  2019 Lspine -2.8/ hip neck -2.2      Essential hypertension 09/09/2017    Pure hypercholesterolemia 09/09/2017    IFG (impaired fasting glucose) 09/09/2017    Breast density 03/22/2016       Past Medical History:   Diagnosis Date    Body mass index (bmi) 26.0-26.9, adult     Hyperlipidemia     Hypertension     Osteoporosis     Unspecified b-cell lymphoma, lymph nodes of inguinal region and lower limb [facility])        Past Surgical History:   Procedure Laterality Date    CHOLECYSTECTOMY      COLONOSCOPY  01/26/2009    Dr Vanessa Campbell, [facility] Right 3/4/2019    EXCISION OF SUPERFICIAL LYMPH NODE, RIGHT GROIN performed by Michael Brian DO at [facility]. Right 3/31/2020    PUNCH BIOPSY OF PUBIS performed by Ashlee Christy MD at [address] N/A 3/31/2020    PORT INSERTION WITH FLUORO performed by Ashlee Christy MD at [address] [facility], [place], operated by [facility]       Current Outpatient Medications   Medication Sig Dispense Refill    riTUXimab (RITUXAN) 100 MG/10ML chemo injection Infuse 375 mg/m2 intravenously once      calcitonin (MIACALCIN) 200 UNIT/ACT nasal spray 1 spray by Nasal route daily 1 Bottle 5    lisinopril-hydroCHLOROthiazide (PRINZIDE;ZESTORETIC) 20-12.5 MG per tablet Take 1 tablet by mouth daily 90 tablet 3    megestrol (MEGACE) 40 MG/ML suspension Take 10 mLs by mouth daily 600 mL 1     No current facility-administered medications for this visit. No Known Allergies    Social History     Socioeconomic History    Marital status:       Spouse name: None    Number of children: None    Years of education: None    Highest education level: None   Occupational History    None   Social Needs    Financial resource strain: None    Food insecurity     Worry: None     Inability: None    Transportation needs     Medical: None     Non-medical: None   Tobacco Use    Smoking status: Former Smoker     Packs/day: 1.00     Years: 2.00     Pack years: 2.00     Types: Cigarettes     Start date:      Last attempt to quit:      Years since quittin.7    Smokeless tobacco: Never Used    Tobacco comment: Retired from being a  and [address]   Substance and Sexual Activity    Alcohol use: No    Drug use: No    Sexual activity: Never   Lifestyle    Physical activity     Days per week: None     Minutes per session: None    Stress: None   Relationships    Social connections     Talks on phone: None     Gets together: None     Attends Hinduism service: None     Active member of club or organization: None     Attends meetings of clubs or organizations: None     Relationship status: None    Intimate partner violence     Fear of current or ex partner: None     Emotionally abused: None     Physically abused: None     Forced sexual activity: None   Other Topics Concern    None   Social History Narrative    None     Family History   Problem Relation Age of Onset    Lung Cancer Mother         not a smoker    Tuberculosis Father        Past Surgical History:   Procedure Laterality Date    CHOLECYSTECTOMY      COLONOSCOPY  2009    Dr Vannesa Birmingham, 1 [facility] Right 3/4/2019    EXCISION OF SUPERFICIAL LYMPH NODE, RIGHT GROIN performed by Maxi Ha DO at [place] [address]. Right 3/31/2020    PUNCH BIOPSY OF PUBIS performed by Felisha Carlson MD at [address] N/A 3/31/2020    PORT INSERTION WITH FLUORO performed by Felisha Carlson MD at [address] [facility], [place], operated by [facility] Outpatient Visit on 09/10/2020   Component Date Value    Sodium 09/10/2020 139     Potassium 09/10/2020 3.8     Chloride 09/10/2020 102     CO2 09/10/2020 24     Anion Gap 09/10/2020 13     Glucose 09/10/2020 103  BUN 09/10/2020 19*    CREATININE 09/10/2020 <0.5     GFR Non- 09/10/2020 >60     Calcium 09/10/2020 9.7     Total Protein 09/10/2020 6.3     Alb 09/10/2020 4.3     Total Bilirubin 09/10/2020 0.3     Alkaline Phosphatase 09/10/2020 129*    ALT 09/10/2020 39     AST 09/10/2020 35     WBC 09/11/2020 4.33     RBC 09/11/2020 3.21*    Hemoglobin 09/11/2020 11.3     Hematocrit 09/11/2020 31.6*    MCV 09/11/2020 98.4*    MCH 09/11/2020 35.2*    MCHC 09/11/2020 35.8*    RDW 09/11/2020 13.1     Platelets 70/23/6840 53*    MPV 09/11/2020 9.7     Neutrophils % 09/11/2020 49.4     Lymphocytes % 09/11/2020 35.6     Monocytes % 09/11/2020 13.6*    Eosinophils % 09/11/2020 1.2     Basophils % 09/11/2020 0.2     Neutrophils Absolute 09/11/2020 2.14     Lymphocytes Absolute 09/11/2020 1.54     Monocytes Absolute 09/11/2020 0.59     Eosinophils Absolute 09/11/2020 0.05     Basophils Absolute 09/11/2020 0.01     Uric Acid, Serum 09/10/2020 3.8     Phosphorus 09/10/2020 4.1     Magnesium 09/10/2020 1.8     LD 09/10/2020 276*   Orders Only on 09/08/2020   Component Date Value    Vit D, 25-Hydroxy 09/08/2020 38.9     TSH 09/08/2020 3.120     Color, UA 09/08/2020 YELLOW     Clarity, UA 09/08/2020 Clear     Glucose, Ur 09/08/2020 Negative     Bilirubin Urine 09/08/2020 Negative     Ketones, Urine 09/08/2020 Negative     Specific Gravity, UA 09/08/2020 1.009     Blood, Urine 09/08/2020 Negative     pH, UA 09/08/2020 7.0     Protein, UA 09/08/2020 Negative     Urobilinogen, Urine 09/08/2020 0.2     Nitrite, Urine 09/08/2020 Negative     Leukocyte Esterase, Urine 09/08/2020 Negative     Cholesterol, Total 09/08/2020 201*    Triglycerides 09/08/2020 89     HDL 09/08/2020 57*    LDL Calculated 09/08/2020 126     Hemoglobin A1C 09/08/2020 4.8     Sodium 09/08/2020 140     Potassium 09/08/2020 3.6     Chloride 09/08/2020 100     CO2 09/08/2020 23     Anion Gap 09/08/2020 17     Glucose 09/08/2020 85     BUN 09/08/2020 17     CREATININE 09/08/2020 0.4*    GFR Non- 09/08/2020 >60     GFR  09/08/2020 >59     Calcium 09/08/2020 10.4*    Total Protein 09/08/2020 7.2     Alb 09/08/2020 4.3     Total Bilirubin 09/08/2020 0.5     Alkaline Phosphatase 09/08/2020 135*    ALT 09/08/2020 44*    AST 09/08/2020 40*   Hospital Outpatient Visit on 08/17/2020   Component Date Value    WBC 08/17/2020 5.99     RBC 08/17/2020 2.94*    Hemoglobin 08/17/2020 10.4*    Hematocrit 08/17/2020 32.4*    MCV 08/17/2020 110.2*    MCH 08/17/2020 35.4*    MCHC 08/17/2020 32.1*    RDW 08/17/2020 17.0*    Platelets 17/85/9261 75*    MPV 08/17/2020 9.7     Neutrophils % 08/17/2020 61.8     Lymphocytes % 08/17/2020 21.0     Monocytes % 08/17/2020 16.5*    Eosinophils % 08/17/2020 0.5*    Basophils % 08/17/2020 0.2     Neutrophils Absolute 08/17/2020 3.70     Lymphocytes Absolute 08/17/2020 1.26     Monocytes Absolute 08/17/2020 0.99*    Eosinophils Absolute 08/17/2020 0.03*    Basophils Absolute 08/17/2020 0.01     Sodium 08/17/2020 137     Potassium 08/17/2020 3.9     Chloride 08/17/2020 103     CO2 08/17/2020 25     Anion Gap 08/17/2020 9     Glucose 08/17/2020 97     BUN 08/17/2020 23*    CREATININE 08/17/2020 <0.5     GFR Non- 08/17/2020 >60     Calcium 08/17/2020 9.9     Total Protein 08/17/2020 6.8     Alb 08/17/2020 4.8     Total Bilirubin 08/17/2020 0.6     Alkaline Phosphatase 08/17/2020 140*    ALT 08/17/2020 69*    AST 08/17/2020 59*    Globulin 08/17/2020 8.0     LD 08/17/2020 665*   Hospital Outpatient Visit on 07/20/2020   Component Date Value    POC Glucose 07/20/2020 115*    Performed on 07/20/2020 [employer]k          Review of Systems   Constitutional: Positive for fatigue. Negative for chills and fever.    HENT: Negative for congestion, ear pain, postnasal drip, sinus pressure, sore throat, trouble swallowing and voice change. Eyes: Negative for pain, redness and visual disturbance. Respiratory: Negative for cough, chest tightness and wheezing. Cardiovascular: Positive for leg swelling. Negative for chest pain and palpitations. Gastrointestinal: Negative for abdominal pain, blood in stool, constipation, diarrhea, nausea and vomiting. Endocrine: Negative for polydipsia and polyuria. Genitourinary: Negative for dysuria, enuresis, flank pain, frequency and urgency. Musculoskeletal: Positive for arthralgias. Negative for gait problem and joint swelling. Skin: Negative for color change and rash. Hair loss   Neurological: Negative for dizziness, tremors, syncope, facial asymmetry, speech difficulty, weakness, numbness and headaches. Psychiatric/Behavioral: Negative for agitation, behavioral problems, confusion, sleep disturbance and suicidal ideas. The patient is not nervous/anxious. Vitals:    09/16/20 1122 09/16/20 1219   BP: (!) 158/80 138/80   Site: Left Upper Arm    Position: Sitting    Cuff Size: Large Adult    Pulse: 101    Resp: 18    SpO2: 98%    Weight: 124 lb (56.2 kg)    Height: 4' 11\" (1.499 m)       Wt Readings from Last 3 Encounters:   09/16/20 124 lb (56.2 kg)   09/10/20 125 lb (56.7 kg)   08/17/20 123 lb 3.2 oz (55.9 kg)   Body mass index is 25.04 kg/m². BP Readings from Last 3 Encounters:   09/16/20 138/80   09/10/20 130/78   08/17/20 (!) 140/70       Physical Exam  Constitutional:       Appearance: She is well-developed. HENT:      Right Ear: External ear normal.      Left Ear: External ear normal.      Mouth/Throat:      Pharynx: No oropharyngeal exudate. Eyes:      Conjunctiva/sclera: Conjunctivae normal.      Pupils: Pupils are equal, round, and reactive to light. Neck:      Musculoskeletal: Neck supple. Thyroid: No thyromegaly. Vascular: No JVD. Cardiovascular:      Rate and Rhythm: Normal rate. Heart sounds: Normal heart sounds.  No on previous vascular test  Patient has been referred to vascular specialist by Dr. Hamlet Sinclair hypertension  - States her blood pressure has been in good range/she checks it regularly at home  At this time patient can continue lisinopril 20/12.5 daily     Pure hypercholesterolemia-   Has not been taking her cholesterol medication since starting her chemotherapy  Will monitor  At this time we will not restart any prescription therapy until patient has completed chemotherapy    IFG (impaired fasting glucose)-   Previously elevated hemoglobin A1c, now it is 4.8     Localized osteoporosis without current pathological fracture- Fosamax was discontinued by oncology, she remains on Miacalcin spray   Obtain follow-up bone density testing      Since patient currently has frequent lab and office visits with medical oncology I will not give her follow-up until September 2021  If meanwhile she develops problems she would need to call us for problem visit    Orders Placed This Encounter   Procedures    ASHLEE DIGITAL SCREEN W OR WO CAD BILATERAL    DEXA BONE DENSITY 2 SITES    INFLUENZA, QUADV, ADJUVANTED, 72 YRS =, IM, PF, PREFILL SYR, 0.5ML (FLUAD)     New Prescriptions    No medications on file        No follow-ups on file. There are no Patient Instructions on file for this visit. Orders Placed This Encounter   Procedures    ASHLEE DIGITAL SCREEN W OR WO CAD BILATERAL     Standing Status:   Future     Standing Expiration Date:   11/16/2021     Scheduling Instructions:      NEEDS AND EARLY Monday MORNING APPT     Order Specific Question:   Does this patient have implants? Answer:   No     Order Specific Question:   Does this patient have a personal history of breast cancer? Answer:   No     Order Specific Question:   Where was last mammogram performed? Answer:   [facility] Specific Question:   When was last mammogram performed?      Answer:   6/27/2019    DEXA BONE DENSITY 2 SITES     Standing Status:   Future     Standing Expiration Date:   9/16/2021     Scheduling Instructions:      NEEDS AN EARLY Monday MORNING APPT    INFLUENZA, QUADV, ADJUVANTED, 65 YRS =, IM, PF, PREFILL SYR, 0.5ML (FLUAD)         EMR Dragon/transcriptiondisclaimer:Significant part of this  encounter note is electronic transcription/translation of spoken language to printed text. The electronic translation of spoken language may be erroneous, or at times, nonsensical wordsor phrases may be inadvertently transcribed.  Although I have reviewed the note for such errors, some may still exist.

## 2020-09-22 ENCOUNTER — TREATMENT (OUTPATIENT)
Dept: PHYSICAL THERAPY | Facility: CLINIC | Age: 75
End: 2020-09-22

## 2020-09-22 DIAGNOSIS — I89.0 LYMPHEDEMA OF RIGHT LOWER EXTREMITY: Primary | ICD-10-CM

## 2020-09-22 PROCEDURE — 97162 PT EVAL MOD COMPLEX 30 MIN: CPT | Performed by: PHYSICAL THERAPIST

## 2020-09-22 NOTE — PROGRESS NOTES
Physical Therapy Lymphedema Initial Evaluation       Patient Name: Mame Jean  : 1945  MRN: [id number]  Today's Date: 2020      Visit Date: 2020    Visit Dx:    ICD-10-CM ICD-9-CM   1. Lymphedema of right lower extremity  I89.0 457.1       Patient Active Problem List   Diagnosis   • Osteoporosis, unspecified        No past medical history on file.     No past surgical history on file.    Visit Dx:    ICD-10-CM ICD-9-CM   1. Lymphedema of right lower extremity  I89.0 457.1       Patient History     Row Name 20 0800             History    Chief Complaint  Swelling  -HR      Date Current Problem(s) Began  19  -HR      Brief Description of Current Complaint  She developed swelling in her R leg over a year ago. It took some time to find that she had lymphoma with involvement of the R inguinal nodes. She has had chemo.   -HR         Pain     Pain Location  Leg  -HR      Pain at Present  1  -HR      Pain at Best  0  -HR      Pain at Worst  5  -HR        User Key  (r) = Recorded By, (t) = Taken By, (c) = Cosigned By    Initials Name Provider Type    HR Lacie Roberts, PT, DPT, CLT-ARTEM Physical Therapist              20 0800   Subjective Pain   Able to rate subjective pain? yes   Pre-Treatment Pain Level 0   Subjective Comments   Subjective Comments The swelling started in her R leg before they found the lymphoma.   Lymphedema Assessment   Lymphedema Classification RLE:;secondary   Lymphedema Cancer Related Sx other (comment)  (R inguinal node FNA and core biopsies)   Lymphedema Surgery Comments no open surgical procedures to R inguinal nodes.    Stage of Cancer Stage IV   Cancer Comments High grade lymphoma   Chemo Received yes   Radiation Therapy Received no   Infections or Cellulitis? no   Lymphedema Edema Assessment   Ptting Edema Category By grade out of 3   Pitting Edema + 3/3   Stemmer Sign right:;positive   Skin Changes/Observations   Location/Assessment Lower Extremity    Lower Extremity Conditions right:;hairless;crust;intact   Lower Extremity Color/Pigment right:;blanchable;hyperpigmented;P'eau d'orange;bilateral:;fibrosis;brawny   Lower Extremity Skin Details R lower leg has multiple deep skin creases along shin and ankle. B dorsal feet have fibrotic changes and peau d'orange appearance.    Lymphedema Measurements   Measurement Type(s) Circumferential   Circumferential Areas Lower extremities   BLE Circumferential (cm)   Measurement Location 1 BOT   Left 1 19.3 cm   Right 1 21 cm   Measurement Location 2 +8   Left 2 21.5 cm   Right 2 25.4 cm   Measurement Location 3 +10   Left 3 20.2 cm   Right 3 26.5 cm   Measurement Location 4 +10   Left 4 28.7 cm   Right 4 34.4 cm   Measurement Location 5 +10   Left 5 29.7 cm   Right 5 33.7 cm   Measurement Location 6 +10   Left 6 34 cm   Right 6 39.5 cm   Measurement Location 7 +10   Left 7 35.5 cm   Right 7 41.3 cm   Measurement Location 8 +10   Left 8 40.5 cm   Right 8 44.9 cm   LLE Circumferential Total 229.4 cm   RLE Circumferential Total 266.7 cm   RLE Circumferential (cm)   Measurement Location 1 BOT   Measurement Location 2 +8   Measurement Location 3 +10   Measurement Location 4 +10   Measurement Location 5 +10   Measurement Location 6 +10   Measurement Location 7 +10   Measurement Location 8 +10                       Therapy Education  Education Details: Lymphedema education given          09/22/20 0800   PT Assessment   Impairments Impaired lymphatic circulation;Integumentary integrity   Assessment Comments Mame has lymphedema in the RLE due to inguinal malignancies. The skin and tissue fibrosis that is already present classifies this at Stage 3. She would benefit from a round of CDT. We will perform MLD and teach her how to perform self massage as well as her daughter if able. She will also need compression garments. Her R distal leg is much more involved and will probably need the inelastic garment while she will need  compression up past the hip as well since the blockage is at the inguinal region. Treatment willl help to decrease the discomfort and aching she has at times in the leg and will reduce her chance of skin breakdown or cellulitis. Thank you for the referral!   Rehab Potential Excellent   Patient/caregiver participated in establishment of treatment plan and goals Yes   Patient would benefit from skilled therapy intervention Yes   PT Plan   PT Frequency 2x/week;1x/week   Predicted Duration of Therapy Intervention (OT) 6 weeks   Planned CPT's? PT RE-EVAL: 18384;PT THER PROC EA 15 MIN: 33258;PT MANUAL THERAPY EA 15 MIN: 13216;PT SELF CARE/HOME MGMT/TRAIN EA 15: 00839   PT Plan Comments PT to see for CDT to address LE lymphedema.            09/22/20 0800   PT Short Term Goals   STG Date to Achieve 10/06/20   STG 1 Patient will have basic understanding of lymphedema and its care.    STG 1 Progress New   STG 2 Patient will be independent with remedial HEP.    STG 2 Progress New   Long Term Goals   LTG Date to Achieve 10/22/20   LTG 1 Patient will be independent with self massage and family will be trained as well.    LTG 1 Progress New   LTG 2 She will have appropriate compression garments for BLEs with focus on RLE.   LTG 2 Progress New   LTG 3 Skin will improve in texture and have no s/s breakdown or infection.   LTG 3 Progress New   LTG 4 She will be independent with a home maintenance program.    LTG 4 Progress New   Time Calculation   PT Goal Re-Cert Due Date 10/22/20                                    Time Calculation:       Therapy Charges for Today     Code Description Service Date Service Provider Modifiers Qty    [address] [place] PHYSICAL THERAPY EVALUATION MOD COMPLEX 30 MINS 9/22/2020 Lacie Roberts, PT, DPT, CLT-ARTEM GP 1                    Lacie Roberts, PT, DPT, CLT-ARTEM  9/23/2020

## 2020-10-05 ENCOUNTER — TREATMENT (OUTPATIENT)
Dept: PHYSICAL THERAPY | Facility: CLINIC | Age: 75
End: 2020-10-05

## 2020-10-05 DIAGNOSIS — I89.0 LYMPHEDEMA OF RIGHT LOWER EXTREMITY: Primary | ICD-10-CM

## 2020-10-05 PROCEDURE — 97140 MANUAL THERAPY 1/> REGIONS: CPT | Performed by: PHYSICAL THERAPIST

## 2020-10-05 PROCEDURE — 97535 SELF CARE MNGMENT TRAINING: CPT | Performed by: PHYSICAL THERAPIST

## 2020-10-05 NOTE — PROGRESS NOTES
Outpatient Physical Therapy Lymphedema Treatment Note       Patient Name: Mame Jean  : 1945  MRN: [id number]  Today's Date: 10/5/2020        Visit Date: 10/05/2020    Visit Dx:    ICD-10-CM ICD-9-CM   1. Lymphedema of right lower extremity  I89.0 457.1       Patient Active Problem List   Diagnosis   • Osteoporosis, unspecified        Lymphedema     Row Name 10/05/20 0800             Subjective Pain    Able to rate subjective pain?  yes  -AL      Pre-Treatment Pain Level  8  -AL      Post-Treatment Pain Level  3  -AL      Subjective Pain Comment  R hip and into the R thigh  -AL         Subjective Comments    Subjective Comments  She states she has had pain this morning in the R thigh and has used heat on it.   -AL         Manual Lymphatic Drainage    Manual Lymphatic Drainage  initial sequence;opened regional lymph nodes;opened anastamoses;extremity treatment  -AL      Initial Sequence  short neck;abdomen;diaphragmatic breathing  -AL      Abdomen  superficial  -AL      Diaphragmatic Breathing  x 10 with superficial abdominals  -AL      Opened Regional Lymph Nodes  axillary  -AL      Axillary  right  -AL      Inguinal  --  -AL      Opened Anastamoses  axillo-inguinal  -AL      Axillo-Inguinal  right  -AL      Extremity Treatment  MLD to full limb  -AL      MLD to Full Limb  R LE  -AL      Manual Lymphatic Drainage Comments  Instructed on HEP and MLD.  Daughter instructed as well  -AL         Compression/Skin Care    Compression/Skin Care  compression garment  -AL      Compression Garment Comments  She will purchase compression caprs and compressin socks to wear daily.   -AL        User Key  (r) = Recorded By, (t) = Taken By, (c) = Cosigned By    Initials Name Provider Type    AL Karey Herrera, SUREKHA, MIS Physical Therapy Assistant                        PT Assessment/Plan     Row Name 10/05/20 0800          PT Assessment    Assessment Comments  Patient is going to purchase compression socks and  cmopression capris.  She would like to purchase these itemss first, she know is they do not contain the fluid she will have to look into an inelastic garments and stronger compression capris.   -AL        PT Plan    PT Plan Comments  Will asses the fit of her compression garments tomorrow.  Continue with CDT.   -AL       User Key  (r) = Recorded By, (t) = Taken By, (c) = Cosigned By    Initials Name Provider Type    Karey Thao, PTA, CLT-ARTEM Physical Therapy Assistant             OP Exercises     Row Name 10/05/20 0800             Subjective Comments    Subjective Comments  She states she has had pain this morning in the R thigh and has used heat on it.   -AL         Subjective Pain    Able to rate subjective pain?  yes  -AL      Pre-Treatment Pain Level  8  -AL      Post-Treatment Pain Level  3  -AL      Subjective Pain Comment  R hip and into the R thigh  -AL         Total Minutes    69994 - PT Manual Therapy Minutes  60  -AL        User Key  (r) = Recorded By, (t) = Taken By, (c) = Cosigned By    Initials Name Provider Type    Karey Thao, PTA, CLT-ARTEM Physical Therapy Assistant                     Manual Rx (last 36 hours)      Manual Treatments     Row Name 10/05/20 0800             Total Minutes    24252 - PT Manual Therapy Minutes  60  -AL        User Key  (r) = Recorded By, (t) = Taken By, (c) = Cosigned By    Initials Name Provider Type    Karey Thao, PTA, CLT-ARTEM Physical Therapy Assistant          PT OP Goals     Row Name 10/05/20 0800          PT Short Term Goals    STG Date to Achieve  10/06/20  -AL     STG 1  Patient will have basic understanding of lymphedema and its care.   -AL     STG 1 Progress  Ongoing  -AL     STG 1 Progress Comments  Educated during treatment.   -AL     STG 2  Patient will be independent with remedial HEP.   -AL     STG 2 Progress  Ongoing  -AL     STG 2 Progress Comments  Instructed on HEP  -AL        Long Term Goals    LTG Date to Achieve  10/22/20  -AL      LTG 1  Patient will be independent with self massage and family will be trained as well.   -AL     LTG 1 Progress  Ongoing  -AL     LTG 1 Progress Comments  Instructed patient and her daughter on MLD  -AL     LTG 2  She will have appropriate compression garments for BLEs with focus on RLE.  -AL     LTG 2 Progress  Ongoing  -AL     LTG 2 Progress Comments  She will purchase compression socks and compression capris today  -AL     LTG 3  Skin will improve in texture and have no s/s breakdown or infection.  -AL     LTG 3 Progress  New  -AL     LTG 4  She will be independent with a home maintenance program.   -AL     LTG 4 Progress  New  -AL        Time Calculation    PT Goal Re-Cert Due Date  10/22/20  -AL       User Key  (r) = Recorded By, (t) = Taken By, (c) = Cosigned By    Initials Name Provider Type    Karey Thao PTA, CLT-LANA Physical Therapy Assistant          Therapy Education  Education Details: HEP and MLD, compression garments  Given: Edema management, HEP  Program: New  How Provided: Verbal, Demonstration, Written  Provided to: Patient, Caregiver  Level of Understanding: Verbalized, Demonstrated  55802 - PT Self Care/Mgmt Minutes: 15              Time Calculation:                     Karey Herrera PTA, CLT-LANA  10/5/2020

## 2020-10-06 ENCOUNTER — TREATMENT (OUTPATIENT)
Dept: PHYSICAL THERAPY | Facility: CLINIC | Age: 75
End: 2020-10-06

## 2020-10-06 DIAGNOSIS — I89.0 LYMPHEDEMA OF RIGHT LOWER EXTREMITY: Primary | ICD-10-CM

## 2020-10-06 PROCEDURE — 97140 MANUAL THERAPY 1/> REGIONS: CPT | Performed by: PHYSICAL THERAPIST

## 2020-10-06 NOTE — PROGRESS NOTES
Outpatient Physical Therapy Lymphedema Treatment Note       Patient Name: Mame Jean  : 1945  MRN: [id number]  Today's Date: 10/6/2020        Visit Date: 10/06/2020    Visit Dx:    ICD-10-CM ICD-9-CM   1. Lymphedema of right lower extremity  I89.0 457.1       Patient Active Problem List   Diagnosis   • Osteoporosis, unspecified        Lymphedema     Row Name 10/06/20 0800             Subjective Pain    Able to rate subjective pain?  yes  -AL      Pre-Treatment Pain Level  0  -AL         Subjective Comments    Subjective Comments  She had to sit a lot in the truck yesterday and she had pain.  She has the compression socks, she has not tried them on yet.  She does have a pair of compression leggings but they are too big.  She is not having any prain right now.  She has worked on the MLD.  She did take Tylenol this morning.  She does have a red spot R anterior ankle she thinks is from where her shoe rubs.   -AL         Skin Changes/Observations    Location/Assessment  Lower Extremity  -AL      Lower Extremity Conditions  right:  -AL      Lower Extremity Skin Details  R lower leg has a small red raised bump.  See media tab for photo.  -AL         Lymphedema Measurements    Measurement Type(s)  Circumferential  -AL      Circumferential Areas  Lower extremities  -AL         BLE Circumferential (cm)    Measurement Location 1  BOT  -AL      Right 1  21 cm  -AL      Measurement Location 2  +8  -AL      Right 2  25 cm  -AL      Measurement Location 3  +10  -AL      Right 3  25.2 cm  -AL      Measurement Location 4  +10  -AL      Right 4  32.3 cm  -AL      Measurement Location 5  +10  -AL      Right 5  32.7 cm  -AL      Measurement Location 6  +10  -AL      Right 6  38.7 cm  -AL      Measurement Location 7  +10  -AL      Right 7  41.4 cm  -AL      Measurement Location 8  +10  -AL      Right 8  46 cm  -AL      RLE Circumferential Total  262.3 cm  -AL         Manual Lymphatic Drainage    Manual Lymphatic Drainage   initial sequence;opened regional lymph nodes;opened anastamoses;extremity treatment  -AL      Initial Sequence  short neck;abdomen;diaphragmatic breathing  -AL      Abdomen  superficial  -AL      Diaphragmatic Breathing  x 10 with superficial abdominals  -AL      Opened Regional Lymph Nodes  axillary  -AL      Axillary  right  -AL      Opened Anastamoses  axillo-inguinal  -AL      Axillo-Inguinal  right  -AL      Extremity Treatment  MLD to full limb  -AL      MLD to Full Limb  R LE  -AL      Manual Lymphatic Drainage Comments  Went over MLD again.   -AL      Manual Therapy  Discussed s/s of cellulits.  She will contact her Dr. or go to ER if the R LE shows any signs of cellulitis.   -AL         Compression/Skin Care    Compression/Skin Care  compression garment  -AL      Compression Garment Comments  Donned the compression socks.  She will wear these during the day.  She will purchase a pair of compression capris.   -AL      Compression/Skin Care Comments  Covered the raised red bump R anterior ankle with an optio foam dressing.   -AL        User Key  (r) = Recorded By, (t) = Taken By, (c) = Cosigned By    Initials Name Provider Type    Karey Thao PTA, CLT-LANA Physical Therapy Assistant                        PT Assessment/Plan     Row Name 10/06/20 0800          PT Assessment    Assessment Comments  Royal now has a pair of compression socks.  They are a good fit, she has compression capris but they are too large.  She will look for another pair.  She has had a reduction in the R LE since her inial measurements.  She has had an increase in size in the R proximal size, she will work on MLD more to this area.   She has dense fibrotic tissue present R lower leg.   -AL        PT Plan    PT Plan Comments  Will continue with CDT, will assess the fit of compression capris.   -AL       User Key  (r) = Recorded By, (t) = Taken By, (c) = Cosigned By    Initials Name Provider Type    Karey Thao PTA,  MIS Physical Therapy Assistant             OP Exercises     Row Name 10/06/20 0800             Subjective Comments    Subjective Comments  She had to sit a lot in the truck yesterday and she had pain.  She has the compression socks, she has not tried them on yet.  She does have a pair of compression leggings but they are too big.  She is not having any prain right now.  She has worked on the MLD.  She did take Tylenol this morning.  She does have a red spot R anterior ankle she thinks is from where her shoe rubs.   -AL         Subjective Pain    Able to rate subjective pain?  yes  -AL      Pre-Treatment Pain Level  0  -AL         Total Minutes    84433 - PT Manual Therapy Minutes  75  -AL        User Key  (r) = Recorded By, (t) = Taken By, (c) = Cosigned By    Initials Name Provider Type    Karey Thao PTA, CLT-LANA Physical Therapy Assistant                     Manual Rx (last 36 hours)      Manual Treatments     Row Name 10/06/20 0800             Total Minutes    41286 - PT Manual Therapy Minutes  75  -AL        User Key  (r) = Recorded By, (t) = Taken By, (c) = Cosigned By    Initials Name Provider Type    Karey Thao PTA, CLT-LANA Physical Therapy Assistant          PT OP Goals     Row Name 10/06/20 0800          PT Short Term Goals    STG Date to Achieve  10/06/20  -AL     STG 1  Patient will have basic understanding of lymphedema and its care.   -AL     STG 1 Progress  Ongoing  -AL     STG 1 Progress Comments  Continue to educated during treatment  -AL     STG 2  Patient will be independent with remedial HEP.   -AL     STG 2 Progress  Ongoing  -AL        Long Term Goals    LTG Date to Achieve  10/22/20  -AL     LTG 1  Patient will be independent with self massage and family will be trained as well.   -AL     LTG 1 Progress  Ongoing  -AL     LTG 1 Progress Comments  She has worked on the MLD  -AL     LTG 2  She will have appropriate compression garments for BLEs with focus on RLE.  -AL      LTG 2 Progress  Ongoing  -AL     LTG 2 Progress Comments  She has compression socks, she will purchase compression capris  -AL     LTG 3  Skin will improve in texture and have no s/s breakdown or infection.  -AL     LTG 3 Progress  Ongoing  -AL     LTG 3 Progress Comments  She has a small area anterior R ankle  -AL     LTG 4  She will be independent with a home maintenance program.   -AL     LTG 4 Progress  New  -AL        Time Calculation    PT Goal Re-Cert Due Date  10/22/20  -AL       User Key  (r) = Recorded By, (t) = Taken By, (c) = Cosigned By    Initials Name Provider Type    Karey Thao PTA, CLT-LANA Physical Therapy Assistant          Therapy Education  Education Details: Work on HEP and MLD.  Wear the compression during the day, remove at night.   Given: Edema management  Program: New  Provided to: Patient  Level of Understanding: Verbalized              Time Calculation:                     Karey Herrera PTA, CLT-LANA  10/6/2020

## 2020-10-12 ENCOUNTER — TREATMENT (OUTPATIENT)
Dept: PHYSICAL THERAPY | Facility: CLINIC | Age: 75
End: 2020-10-12

## 2020-10-12 DIAGNOSIS — I89.0 LYMPHEDEMA OF RIGHT LOWER EXTREMITY: Primary | ICD-10-CM

## 2020-10-12 PROCEDURE — 97140 MANUAL THERAPY 1/> REGIONS: CPT | Performed by: PHYSICAL THERAPIST

## 2020-10-12 NOTE — PROGRESS NOTES
Outpatient Physical Therapy Lymphedema Treatment Note       Patient Name: Mame Jean  : 1945  MRN: [id number]  Today's Date: 10/12/2020        Visit Date: 10/12/2020    Visit Dx:    ICD-10-CM ICD-9-CM   1. Lymphedema of right lower extremity  I89.0 457.1       Patient Active Problem List   Diagnosis   • Osteoporosis, unspecified        Lymphedema     Row Name 10/12/20 0800             Subjective Pain    Able to rate subjective pain?  yes  -AL      Pre-Treatment Pain Level  0  -AL         Subjective Comments    Subjective Comments  She had some pain over the weekend, she took Tylenol for pain.  She is workin galina the HEP and MLD.  She some new compression for the upper legs, they are [place] shorts.  She states the compression socks are cutting into her legs behind the knee.  She thinks there is another node swollen in the L groin, she sees Dr. Brandon 20.  She will have her CT scan 10/26/20.  -AL         Manual Lymphatic Drainage    Manual Lymphatic Drainage  initial sequence;opened regional lymph nodes;opened anastamoses;extremity treatment  -AL      Initial Sequence  short neck;abdomen;diaphragmatic breathing  -AL      Abdomen  superficial  -AL      Diaphragmatic Breathing  x 10 with superficial abdominals  -AL      Opened Regional Lymph Nodes  axillary  -AL      Axillary  right  -AL      Opened Anastamoses  axillo-inguinal  -AL      Axillo-Inguinal  right  -AL      Extremity Treatment  MLD to full limb  -AL      MLD to Full Limb  R LE  -AL      Manual Lymphatic Drainage Comments  Went over HEP   -AL      Manual Therapy  Instructed patient to call Dr. Rose about the swollen lymph node L groin as well as the raised area R anterior ankle.   -AL         Compression/Skin Care    Compression/Skin Care  compression garment;skin care  -AL      Skin Care  lotion applied  -AL      Compression Garment Comments  Donned the compression socks.  She will wear these during the day.  She will purchase a pair of  compression caparis, she will return the [place] shorts since they do not go past the knee.   -AL      Compression/Skin Care Comments  Covered the raised red bump R anterior ankle with an optio foam dressing.   -AL        User Key  (r) = Recorded By, (t) = Taken By, (c) = Cosigned By    Initials Name Provider Type    Karey Thao PTA, MIS Physical Therapy Assistant                        PT Assessment/Plan     Row Name 10/12/20 0800          PT Assessment    Assessment Comments  Patient will call Dr. Rose's office about the swollen lymph node on the L and the raised area L anterior ankle.  She purchased compression shorts, but they do not go past the knee.  She will return the shorts and purchase conpression capris.  She has some discomfort from the socks just below the knees but no red or open areas.  She will wear the capris and socks tomorrow when she comes for her appointment.   -AL        PT Plan    PT Plan Comments  Continue with CDT, assess fit of compression.   -AL       User Key  (r) = Recorded By, (t) = Taken By, (c) = Cosigned By    Initials Name Provider Type    Karey Thao, SUREKHA, MIS Physical Therapy Assistant             OP Exercises     Row Name 10/12/20 0800             Subjective Comments    Subjective Comments  She had some pain over the weekend, she took Tylenol for pain.  She is workin galina the HEP and MLD.  She some new compression for the upper legs, they are [place] shorts.  She states the compression socks are cutting into her legs behind the knee.  She thinks there is another node swollen in the L groin, she sees Dr. Brandon 11/16/20.  She will have her CT scan 10/26/20.  -AL         Subjective Pain    Able to rate subjective pain?  yes  -AL      Pre-Treatment Pain Level  0  -AL         Total Minutes    07420 - PT Manual Therapy Minutes  75  -AL        User Key  (r) = Recorded By, (t) = Taken By, (c) = Cosigned By    Initials Name Provider Type    Karey Thao,  SUREKHA, MAYI-ARTEM Physical Therapy Assistant                     Manual Rx (last 36 hours)      Manual Treatments     Row Name 10/12/20 0800             Total Minutes    55679 - PT Manual Therapy Minutes  75  -AL        User Key  (r) = Recorded By, (t) = Taken By, (c) = Cosigned By    Initials Name Provider Type    Karey Thao PTA, CLT-ARTEM Physical Therapy Assistant          PT OP Goals     Row Name 10/12/20 0800          PT Short Term Goals    STG Date to Achieve  10/06/20  -AL     STG 1  Patient will have basic understanding of lymphedema and its care.   -AL     STG 1 Progress  Ongoing;Progressing  -AL     STG 1 Progress Comments  Continues to improve  -AL     STG 2  Patient will be independent with remedial HEP.   -AL     STG 2 Progress  Ongoing;Progressing  -AL     STG 2 Progress Comments  Compliant with the HEP  -AL        Long Term Goals    LTG Date to Achieve  10/22/20  -AL     LTG 1  Patient will be independent with self massage and family will be trained as well.   -AL     LTG 1 Progress  Ongoing;Progressing  -AL     LTG 1 Progress Comments  She is working on the MLD  -AL     LTG 2  She will have appropriate compression garments for BLEs with focus on RLE.  -AL     LTG 2 Progress  Ongoing  -AL     LTG 2 Progress Comments  She will purhcase a pair of compression capris today, she is wearing the compression socks  -AL     LTG 3  Skin will improve in texture and have no s/s breakdown or infection.  -AL     LTG 3 Progress  Ongoing  -AL     LTG 3 Progress Comments  She still has an bump R anterior ankle.  -AL     LTG 4  She will be independent with a home maintenance program.   -AL     LTG 4 Progress  New  -AL        Time Calculation    PT Goal Re-Cert Due Date  10/22/20  -AL       User Key  (r) = Recorded By, (t) = Taken By, (c) = Cosigned By    Initials Name Provider Type    Karey Thao PTA, CLT-ARTEM Physical Therapy Assistant          Therapy Education  Education Details: Malika with CDT,  purchase compression capris.    Given: Edema management  Program: Reinforced  How Provided: Verbal  Provided to: Patient  Level of Understanding: Verbalized              Time Calculation:                     Karey Herrera PTA, CLT-ARTEM  10/12/2020   Pt was seen and evaluated by me. Pt is a 83 y/o female with PMHx of Bladder resection and HLD presenting to the ED for palpitations. Pt states she has not been sleeping much and felt some palpitations and checked her pulse on her home BP cuff and noted it was 180. Pt denies any fever, chills, nausea, vomiting, SOB, chest pain, or abd pain. Lungs CTA b/l. RRR. Abd soft, non-tender.

## 2020-10-13 ENCOUNTER — TREATMENT (OUTPATIENT)
Dept: PHYSICAL THERAPY | Facility: CLINIC | Age: 75
End: 2020-10-13

## 2020-10-13 DIAGNOSIS — I89.0 LYMPHEDEMA OF RIGHT LOWER EXTREMITY: Primary | ICD-10-CM

## 2020-10-13 PROCEDURE — 97140 MANUAL THERAPY 1/> REGIONS: CPT | Performed by: PHYSICAL THERAPIST

## 2020-10-13 NOTE — PROGRESS NOTES
Outpatient Physical Therapy Lymphedema Treatment Note       Patient Name: Mame Jean  : 1945  MRN: [id number]  Today's Date: 10/13/2020        Visit Date: 10/13/2020    Visit Dx:    ICD-10-CM ICD-9-CM   1. Lymphedema of right lower extremity  I89.0 457.1       Patient Active Problem List   Diagnosis   • Osteoporosis, unspecified        Lymphedema     Row Name 10/13/20 0800             Subjective Pain    Able to rate subjective pain?  yes  -AL      Pre-Treatment Pain Level  2  -AL      Post-Treatment Pain Level  0  -AL      Subjective Pain Comment  R thigh  -AL         Subjective Comments    Subjective Comments  She did get an appointment with Dr. Rose on 10/20/20.  She states the sock are tight just behind the knee and are very uncomfortable.  She has looked all over town and she is not able to find compression carpris.   -AL         Skin Changes/Observations    Location/Assessment  Lower Extremity  -AL      Lower Extremity Conditions  right:  -AL         Lymphedema Measurements    Measurement Type(s)  Circumferential  -AL      Circumferential Areas  Lower extremities  -AL         BLE Circumferential (cm)    Measurement Location 1  BOT  -AL      Right 1  19.7 cm  -AL      Measurement Location 2  +8  -AL      Right 2  24.2 cm  -AL      Measurement Location 3  +10  -AL      Right 3  21.6 cm  -AL      Measurement Location 4  +10  -AL      Right 4  29.5 cm  -AL      Measurement Location 5  +10  -AL      Right 5  30.4 cm  -AL      Measurement Location 6  +10  -AL      Right 6  38.4 cm  -AL      Measurement Location 7  +10  -AL      Right 7  41.4 cm  -AL      Measurement Location 8  +10  -AL      Right 8  46.5 cm  -AL      RLE Circumferential Total  251.7 cm  -AL         RLE Circumferential (cm)    Measurement Location 1  BOT  -AL      Measurement Location 2  +8  -AL      Measurement Location 3  +10  -AL      Measurement Location 4  +10  -AL      Measurement Location 5  +10  -AL      Measurement Location 6   +10  -AL      Measurement Location 7  +10  -AL      Measurement Location 8  +10  -AL         Manual Lymphatic Drainage    Manual Lymphatic Drainage  initial sequence;opened regional lymph nodes;opened anastamoses;extremity treatment  -AL      Initial Sequence  short neck;abdomen;diaphragmatic breathing  -AL      Abdomen  superficial  -AL      Diaphragmatic Breathing  x 10 with superficial abdominals  -AL      Opened Regional Lymph Nodes  axillary  -AL      Axillary  right  -AL      Opened Anastamoses  axillo-inguinal  -AL      Axillo-Inguinal  right  -AL      Extremity Treatment  MLD to full limb  -AL      MLD to Full Limb  R LE  -AL         Compression/Skin Care    Compression/Skin Care  compression garment  -AL      Skin Care  lotion applied  -AL      Compression Garment Comments  Donned the compression socks  -AL      Compression/Skin Care Comments  She will purchase a pair of Bioflect compression capris and a Farrow Basic garment for the R lower leg.   -AL        User Key  (r) = Recorded By, (t) = Taken By, (c) = Cosigned By    Initials Name Provider Type    Karey Thao PTA, CLT-LANA Physical Therapy Assistant                        PT Assessment/Plan     Row Name 10/13/20 0800          PT Assessment    Assessment Comments  Patient has had a redcution in the R LE since last week.  The compression is working, but it is tight behind her knee and is very uncomfortable.  She will purchase a Farrow Basic garment and the Bioflect capris. The Farrow garment will be more comforable for patient.   -AL        PT Plan    PT Plan Comments  Continue with CDT, assess fit of compression.   -AL       User Key  (r) = Recorded By, (t) = Taken By, (c) = Cosigned By    Initials Name Provider Type    Karey Thao PTA, CLT-LANA Physical Therapy Assistant             OP Exercises     Row Name 10/13/20 0800             Subjective Comments    Subjective Comments  She did get an appointment with Dr. Rose on 10/20/20.   She states the sock are tight just behind the knee and are very uncomfortable.  She has looked all over town and she is not able to find compression carpris.   -AL         Subjective Pain    Able to rate subjective pain?  yes  -AL      Pre-Treatment Pain Level  2  -AL      Post-Treatment Pain Level  0  -AL      Subjective Pain Comment  R thigh  -AL         Total Minutes    25663 - PT Manual Therapy Minutes  75  -AL        User Key  (r) = Recorded By, (t) = Taken By, (c) = Cosigned By    Initials Name Provider Type    Karey Thao PTA, MAYI-ARTEM Physical Therapy Assistant                     Manual Rx (last 36 hours)      Manual Treatments     Row Name 10/13/20 0800             Total Minutes    19579 - PT Manual Therapy Minutes  75  -AL        User Key  (r) = Recorded By, (t) = Taken By, (c) = Cosigned By    Initials Name Provider Type    Karey Thao PTA, MAYI-ARTEM Physical Therapy Assistant          PT OP Goals     Row Name 10/13/20 0800          PT Short Term Goals    STG Date to Achieve  10/06/20  -AL     STG 1  Patient will have basic understanding of lymphedema and its care.   -AL     STG 1 Progress  Ongoing;Progressing  -AL     STG 1 Progress Comments  Improving  -AL     STG 2  Patient will be independent with remedial HEP.   -AL     STG 2 Progress  Ongoing;Progressing  -AL     STG 2 Progress Comments  Compliant with HEP  -AL        Long Term Goals    LTG Date to Achieve  10/22/20  -AL     LTG 1  Patient will be independent with self massage and family will be trained as well.   -AL     LTG 1 Progress  Ongoing;Progressing  -AL     LTG 2  She will have appropriate compression garments for BLEs with focus on RLE.  -AL     LTG 2 Progress  Ongoing  -AL     LTG 2 Progress Comments  She is going to buy the Farrow   -AL     LTG 3  Skin will improve in texture and have no s/s breakdown or infection.  -AL     LTG 3 Progress  Ongoing  -AL     LTG 4  She will be independent with a home maintenance program.    -AL     LTG 4 Progress  New  -AL        Time Calculation    PT Goal Re-Cert Due Date  10/22/20  -AL       User Key  (r) = Recorded By, (t) = Taken By, (c) = Cosigned By    Initials Name Provider Type    Karey Thao PTA, CLT-LANA Physical Therapy Assistant          Therapy Education  Education Details: Purchase Farrow Basic wraps and compression leggings  Given: Edema management  Program: New  How Provided: Verbal, Written  Provided to: Patient  Level of Understanding: Verbalized              Time Calculation:                     Karey Herrera PTA, CLT-LANA  10/13/2020

## 2020-10-19 ENCOUNTER — TREATMENT (OUTPATIENT)
Dept: PHYSICAL THERAPY | Facility: CLINIC | Age: 75
End: 2020-10-19

## 2020-10-19 DIAGNOSIS — I89.0 LYMPHEDEMA OF RIGHT LOWER EXTREMITY: Primary | ICD-10-CM

## 2020-10-19 PROCEDURE — 97140 MANUAL THERAPY 1/> REGIONS: CPT | Performed by: PHYSICAL THERAPIST

## 2020-10-19 NOTE — PROGRESS NOTES
performed. 12/13/2018-CT abdomen pelvis showed a moderately prominent right inguinal lymph node. In addition, an oval solid nodule in the left labium/left vagina wall measures 2.5 x 1.6 cm.   2/20/2019-the patient was seen by Dr. Cameron Gómez, who requested an ultrasound of the pelvis. 2/28/2019-US pelvis showed a hypoechoic 2.3 x 2.1 x 2.1 cm area within the vagina concerning for Bartholin's cyst.   2/28/2019-she was seen by Dr. Larisa Quiroga from General surgery with complains of enlarging right inguinal adenopathy, and therefore was recommended FNA biopsy   3/4/2019-FNA biopsy consistent B-cell lymphoma on morphology and IHC (positive for CD3, CD5 and CD20 and negative for BCL-2, BCL 6, cyclin D1, CD30 and MUM-1. Extensive necrosis, and therefore, further characterization was not possible. 3/27/2019-she was first seen by me. 4/3/2019-bone marrow biopsy/aspirate showed involvement by a cyclin D1 negative, CD5/CD23 positive monoclonal B-cell population consistent with involvement of bone marrow by B-cell SLL (10%). CD20 positive. Normal female karyotype 55 XX. FISH cytogenetics 13 q deletion   4/8/2019-CT chest showed no evidence of mediastinal, hilar adenopathy. 4/9/2019-PET scan showed FDG avid enlarged right pelvic and inguinal lymph nodes. Suggestive for active lymphoma. Specifically, a right obturator lymph node with SUV 23. Right external iliac lymph node with SUV 27. Right inguinal lymph node with SUV 11. May through November 2019-Imbruvica with progressive disease or poor tolerance. 12/3/2019- venetoclax/Gazyva fixed duration treatment 1 year expected. 3/16/2020-CT chest abdomen pelvis showed no evidence of lymphadenopathy in the chest. Marked interval progression of disease in the abdomen and pelvis. Lesions are seen in the liver, pancreas, periportal region, left retroperitoneal area, and bilateral inguinal chains.  There is also enlargement of previously seen right pelvic lymph nodes.  3/17/2020- core biopsy of right inguinal lymph node consistent with high-grade lymphoma. Flow cytometry positive for CD20 and BCL 6 and negative for CD5. BCL-2 mostly negative. Ki-67 95%. Findings consistent with high-grade B-cell lymphoma. 3/20/2020- recommended 6 cycles of R-CHOP.  5/29/2020- Ct Chest/Abdomen/Pelvis W Contrast A right cardiophrenic angle lymph node is smaller on the current study. There is no other lymphadenopathy in the chest.Scattered tiny subpleural pulmonary nodules are likely postinfectious or postinflammatory. They are stable in appearance. There are no new pulmonary nodules. Scattered tiny nodules in both thyroid lobes measuring up to 3 or 4 mm. Positive treatment response with decreased size of liver lesions and decrease in abdominopelvic lymphadenopathy. 7/20/2020 CT Chest/Abdomen/Pelvis-The decrease in size of an abnormal right cardiophrenic sulcus lymph node since the previous study. No other abnormal lymph node in the mediastinum and marcelo are noted. Several scattered tiny subpleural nodules in both lungs, more numerous in the right upper lung are stable since the previous study and may represent a benign process. Continued decrease in liver lesions and lymphadenopathy. Findings are consistent with continued treatment response when compared to CT from 5/29/2020. Mild RIGHT hydronephrosis, likely related to ureteral compression by pelvic adenopathy. 7/20/2020 Pet Ct Skull Base To Mid Thigh- There are hypermetabolic liver lesions as well hypermetabolic partially calcified lymph nodes within the periportal region and the right pelvic wall as well as bilateral inguinal regions. The greatest intensity of uptake is along the lateral margin of the enlarged right common femoral lymph node. Please refer to dictation above. Findings represent progression compared to the previous PET exam of 4/19/2019.  Please refer to diagnostic CT exams from today to compare to recent CT studies needs     Medical: Not on file     Non-medical: Not on file   Tobacco Use    Smoking status: Former Smoker     Packs/day: 1.00     Years: 2.00     Pack years: 2.00     Types: Cigarettes     Start date: 65     Last attempt to quit:      Years since quittin.8    Smokeless tobacco: Never Used    Tobacco comment: Retired from being a  and [address]   Substance and Sexual Activity    Alcohol use: No    Drug use: No    Sexual activity: Never   Lifestyle    Physical activity     Days per week: Not on file     Minutes per session: Not on file    Stress: Not on file   Relationships    Social connections     Talks on phone: Not on file     Gets together: Not on file     Attends Taoism service: Not on file     Active member of club or organization: Not on file     Attends meetings of clubs or organizations: Not on file     Relationship status: Not on file    Intimate partner violence     Fear of current or ex partner: Not on file     Emotionally abused: Not on file     Physically abused: Not on file     Forced sexual activity: Not on file   Other Topics Concern    Not on file   Social History Narrative    Not on file        Family history:   Family History   Problem Relation Age of Onset    Lung Cancer Mother         not a smoker    Tuberculosis Father         Current Outpatient Medications   Medication Sig Dispense Refill    riTUXimab (RITUXAN) 100 MG/10ML chemo injection Infuse 375 mg/m2 intravenously once      calcitonin (MIACALCIN) 200 UNIT/ACT nasal spray 1 spray by Nasal route daily 1 Bottle 5    lisinopril-hydroCHLOROthiazide (PRINZIDE;ZESTORETIC) 20-12.5 MG per tablet Take 1 tablet by mouth daily 90 tablet 3    megestrol (MEGACE) 40 MG/ML suspension Take 10 mLs by mouth daily 600 mL 1     No current facility-administered medications for this visit.          REVIEW OF SYSTEMS:    Constitutional: no fever, no night sweats,  fatigue, weight loss  HEENT: no blurring of vision, no and oriented to time and place and person. Relevant Lab findings:  Lab Results   Component Value Date     10/20/2020    K 3.3 (L) 10/20/2020    CL 97 (L) 10/20/2020    CO2 29 10/20/2020    BUN 17 10/20/2020    CREATININE 0.7 10/20/2020    GLUCOSE 131 (H) 10/20/2020    CALCIUM 10.2 10/20/2020    PROT 7.0 10/20/2020    LABALBU 4.7 10/20/2020    BILITOT 0.4 10/20/2020    ALKPHOS 129 (H) 10/20/2020    AST 44 (H) 10/20/2020    ALT 51 10/20/2020    LABGLOM >60 10/20/2020    GFRAA >59 09/08/2020    AGRATIO 1.5 01/15/2020    GLOB 8.0 08/17/2020     9/10/2020     Mg 1.8  Phos 4.1  Uric Acid 3.8    Relevant Imaging studies:  No results found. ASSESSMENT:    Orders Placed This Encounter   Procedures    CT CHEST W CONTRAST     Standing Status:   Future     Standing Expiration Date:   10/20/2021    Comprehensive Metabolic Panel     Standing Status:   Future     Number of Occurrences:   1     Standing Expiration Date:   10/20/2021    Lactate Dehydrogenase     Standing Status:   Future     Number of Occurrences:   1     Standing Expiration Date:   10/20/2021    [facility]     Referral Priority:   Routine     Referral Type:   Eval and Treat     Referral Reason:   Specialty Services Required     Referred to Provider:   Jose Rizvi MD     Requested Specialty:   General Surgery     Number of Visits Requested:   1      Nati Valladares was seen today for follow-up. 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 PET scan showed persistent uptake in the liver. The patient has clinically gained weight and feels much better. The patient presents today with complaints of increased right/left inguinal adenopathy. She also has a new palpable nodule in the dorsal aspect of the right foot. Recommended CT chest abdomen pelvis. 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 No response to frontline Imbruvica or Felisha Severs. The patient Ricther's transformation. Therefore consistent with high-grade lymphoma. She has completed 6 cycles of R-CHOP and has been recommended maintenance with rituximab based on her prior B-cell SLL component. Now with progressive disease. #Treatment related toxicity- fatigue. Bone marrow suppression, thrombocytopenia    #Right lower extremity edema-likely secondary to compressive inguinal adenopathy/Baker's cyst on the right. No signs of DVT on prior vascular study 6/18/2019. She continued to have significant leg swelling in the right lower extremity. #Hypertension-controlled. Follow-up with PCP. #Transaminitis- 2/2 lymphoma. Stable. #Macrocytic anemia-secondary to chemotherapy. Hemoglobin 12.9  Improving. #Thrombocytopenia-secondary to chemotherapy. 65,000. Plan:  · Biopsy of anterior right ankle lesion and left inguinal lymph node  CMP, LDH today  CT chest/abdomen/pelvis before next visit -scheduled 10/26/20   RTC 11/3/2020 in [place]      Return in 2 weeks (on 11/3/2020) for See Dr. Clovis Mcwilliams in [place].    Biopsy with Dr Ayaka Hammer; Please ask radiology to add a CT Chest to her CT Abdomen/Pelvis on 10/26     I, Dr Tatianna Hatch, personally performed the services described in this documentation as scribed by Lanis Opitz, RN in my presence and is both accurate and complete. I have seen, examined and reviewed this patient medication list, appropriate labs and imaging studies. I reviewed relevant medical records and others physicians notes. I discussed the plans of care with the patient. I answered all the questions to the patients satisfaction. (Please note that portions of this note were completed with a voice recognition program. Efforts were made to edit the dictations but occasionally words are mis-transcribed.)  Over 50% of the total visit time of 25 minutes in face to face encounter with the patient, out of which more than 50% of the time was spent in counseling patient or family and coordination of care. Counseling included but was not limited to time spent reviewing labs, imaging studies/ treatment plan and answering questions.

## 2020-10-19 NOTE — PROGRESS NOTES
Outpatient Physical Therapy Lymphedema Treatment Note       Patient Name: Mame Jean  : 1945  MRN: [id number]  Today's Date: 10/19/2020        Visit Date: 10/19/2020    Visit Dx:    ICD-10-CM ICD-9-CM   1. Lymphedema of right lower extremity  I89.0 457.1       Patient Active Problem List   Diagnosis   • Osteoporosis, unspecified        Lymphedema     Row Name 10/19/20 0845             Subjective Pain    Able to rate subjective pain?  yes  -KR      Pre-Treatment Pain Level  3  -KR      Post-Treatment Pain Level  0  -KR         Subjective Comments    Subjective Comments  She relates she had to take 2 Acetamenophen this morning due to right LE pain.  She did obtain a farrow garment for the right LE and is wearing it today,  -KR         Skin Changes/Observations    Location/Assessment  Lower Extremity  -KR      Lower Extremity Conditions  right:  -KR         Lymphedema Measurements    Measurement Type(s)  Circumferential  -KR      Circumferential Areas  Lower extremities  -KR         BLE Circumferential (cm)    Measurement Location 1  BOT  -KR      Right 1  21 cm  -KR      Measurement Location 2  +8  -KR      Right 2  24.5 cm  -KR      Measurement Location 3  +10  -KR      Right 3  21.4 cm  -KR      Measurement Location 4  +10  -KR      Right 4  29.2 cm  -KR      Measurement Location 5  +10  -KR      Right 5  32.2 cm  -KR      Measurement Location 6  +10  -KR      Right 6  38.5 cm  -KR      Measurement Location 7  +10  -KR      Right 7  41.1 cm  -KR      Measurement Location 8  +10  -KR      RLE Circumferential Total  207.9 cm  -KR         Manual Lymphatic Drainage    Manual Lymphatic Drainage  initial sequence;opened regional lymph nodes;opened anastamoses;extremity treatment  -KR      Initial Sequence  short neck;abdomen;diaphragmatic breathing  -KR      Abdomen  superficial  -KR      Opened Regional Lymph Nodes  axillary  -KR      Axillary  right  -KR      Opened Anastamoses  axillo-inguinal  -KR       Axillo-Inguinal  right  -KR      Extremity Treatment  MLD to full limb  -KR         Compression/Skin Care    Compression/Skin Care  compression garment  -KR      Skin Care  lotion applied  -KR        User Key  (r) = Recorded By, (t) = Taken By, (c) = Cosigned By    Initials Name Provider Type    Martine Ortega, PT DPT Physical Therapist                        PT Assessment/Plan     Row Name 10/19/20 0894          PT Assessment    Assessment Comments  Patient has obtained a farrow garment for the R LE which is fitting well. She is able to doff/don independently.  Hardened lymph node palpated on left inguinal lymph nodes.  She has been performing self MLD and R LE circumferential measurements decreased above the ankle today.   -KR        PT Plan    PT Plan Comments  Continue with CDT.  She has ordered the Bioflex deysi.  Assess that fit when it arrives.  -KR       User Key  (r) = Recorded By, (t) = Taken By, (c) = Cosigned By    Initials Name Provider Type    Martine Ortega, PT DPT Physical Therapist             OP Exercises     Row Name 10/19/20 1238             Subjective Comments    Subjective Comments  She relates she had to take 2 Acetamenophen this morning due to right LE pain.  She did obtain a farrow garment for the right LE and is wearing it today,  -KR         Subjective Pain    Able to rate subjective pain?  yes  -KR      Pre-Treatment Pain Level  3  -KR      Post-Treatment Pain Level  0  -KR        User Key  (r) = Recorded By, (t) = Taken By, (c) = Cosigned By    Initials Name Provider Type    Martine Ortega, PT DPT Physical Therapist                      PT OP Goals     Row Name 10/19/20 9284          PT Short Term Goals    STG Date to Achieve  10/06/20  -KR     STG 1  Patient will have basic understanding of lymphedema and its care.   -KR     STG 1 Progress  Ongoing;Progressing  -KR     STG 2  Patient will be independent with remedial HEP.   -KR     STG 2 Progress  Ongoing;Progressing  -KR        Long  Term Goals    LTG Date to Achieve  10/22/20  -KR     LTG 1  Patient will be independent with self massage and family will be trained as well.   -KR     LTG 1 Progress  Ongoing;Progressing  -KR     LTG 2  She will have appropriate compression garments for BLEs with focus on RLE.  -KR     LTG 2 Progress  Progressing  -KR     LTG 2 Progress Comments  has farrow compression garment for R LE  -KR     LTG 3  Skin will improve in texture and have no s/s breakdown or infection.  -KR     LTG 3 Progress  Ongoing  -KR     LTG 4  She will be independent with a home maintenance program.   -KR     LTG 4 Progress  New  -KR        Time Calculation    PT Goal Re-Cert Due Date  10/22/20  -KR       User Key  (r) = Recorded By, (t) = Taken By, (c) = Cosigned By    Initials Name Provider Type    Martine Ortega, PT DPT Physical Therapist                         Time Calculation:                     Martine Short, PT DPT  10/19/2020

## 2020-10-20 ENCOUNTER — TREATMENT (OUTPATIENT)
Dept: PHYSICAL THERAPY | Facility: CLINIC | Age: 75
End: 2020-10-20

## 2020-10-20 DIAGNOSIS — I89.0 LYMPHEDEMA OF RIGHT LOWER EXTREMITY: Primary | ICD-10-CM

## 2020-10-20 PROCEDURE — 97140 MANUAL THERAPY 1/> REGIONS: CPT | Performed by: PHYSICAL THERAPIST

## 2020-10-20 NOTE — PROGRESS NOTES
Outpatient Physical Therapy Lymphedema Progress Note       Patient Name: Mame Jean  : 1945  MRN: [id number]  Today's Date: 10/20/2020        Visit Date: 10/20/2020    Visit Dx:    ICD-10-CM ICD-9-CM   1. Lymphedema of right lower extremity  I89.0 457.1       Patient Active Problem List   Diagnosis   • Osteoporosis, unspecified        Lymphedema     Row Name 10/20/20 0800             Subjective Pain    Able to rate subjective pain?  yes  -AL      Pre-Treatment Pain Level  2 With Tylenol  -AL      Post-Treatment Pain Level  0  -AL      Subjective Pain Comment  R thigh  -AL         Subjective Comments    Subjective Comments  She states she still has some R thigh pain, mostly lateral thigh  She states she still has the bump on the R anterior ankle, this as gotten larger and is tender to touch.  She will see Dr. Rose today about the swollen  lymph node L pablo and the raised area R anterior ankle.  She likes the Farrow Basic garment, she has ordered the compression leggings.   -AL         Skin Changes/Observations    Location/Assessment  Lower Extremity  -AL      Lower Extremity Conditions  right:  -AL         Lymphedema Measurements    Measurement Type(s)  Circumferential  -AL      Circumferential Areas  Lower extremities  -AL         BLE Circumferential (cm)    Measurement Location 1  BOT  -AL      Right 1  19.9 cm  -AL      Measurement Location 2  +8  -AL      Right 2  23.9 cm  -AL      Measurement Location 3  +10  -AL      Right 3  21.6 cm  -AL      Measurement Location 4  +10  -AL      Right 4  29 cm  -AL      Measurement Location 5  +10  -AL      Right 5  31 cm  -AL      Measurement Location 6  +10  -AL      Right 6  38.7 cm  -AL      Measurement Location 7  +10  -AL      Right 7  40.6 cm  -AL      Measurement Location 8  +10  -AL      Right 8  46.6 cm  -AL      RLE Circumferential Total  251.3 cm  -AL         Manual Lymphatic Drainage    Manual Lymphatic Drainage  initial sequence;opened regional  lymph nodes;opened anastamoses;extremity treatment  -AL      Initial Sequence  short neck;abdomen;diaphragmatic breathing  -AL      Abdomen  superficial  -AL      Diaphragmatic Breathing  x 10 with superficial abdominals  -AL      Opened Regional Lymph Nodes  axillary  -AL      Axillary  right  -AL      Opened Anastamoses  axillo-inguinal  -AL      Axillo-Inguinal  right  -AL      Extremity Treatment  MLD to full limb  -AL      MLD to Full Limb  R LE  -AL      Manual Therapy  --  -AL         Compression/Skin Care    Compression/Skin Care  compression garment  -AL      Skin Care  lotion applied  -AL      Compression Garment Comments  Helped her don the Hybrid sock and Farrow Basic garment to the R lower leg.  I did cover the raised area R anterior ankle with an Optio foam dressing.  Start wearing the compression capris when they arrive, continue to wear the Farrow Basic garment with the Hybrid sock.   -AL      Compression/Skin Care Comments  Raised area R anterior ankle measurements:  1.8 cm Length  x 2 cm width, 0.5 cm height.  The area is tender to touch, there is no drainage.  See media tab for photo.   -AL        User Key  (r) = Recorded By, (t) = Taken By, (c) = Cosigned By    Initials Name Provider Type    Karey Thao, PTA, CLT-ARTEM Physical Therapy Assistant                                PT Assessment/Plan     Row Name 10/20/20 0800          PT Assessment    Impairments  Impaired lymphatic circulation;Integumentary integrity  -HR     Assessment Comments  The Farrow basic garment and Hybrid socks are helping to reduce the R lower leg.  She is waiting for the compression capris to come in  for compression  over the abdomen and upper leg.  The raised area R anterior ankle is larger this week, she is going to see Dr. Rose about this today as well as the swollen lymph node L groin.  The raised area is tender to touch.  She is much more comfortable in the Farrow garment as compared to the compression  sock.  -AL     Rehab Potential  Excellent  -HR     Patient/caregiver participated in establishment of treatment plan and goals  Yes  -HR     Patient would benefit from skilled therapy intervention  Yes  -HR        PT Plan    PT Frequency  1x/week;2x/week  -HR     Predicted Duration of Therapy Intervention (PT)  4 weeks  -HR     Planned CPT's?  PT RE-EVAL: 38129;PT THER PROC EA 15 MIN: 43916;PT MANUAL THERAPY EA 15 MIN: 66418;PT SELF CARE/HOME MGMT/TRAIN EA 15: 05655  -HR     PT Plan Comments  Continue with CDT, assess fit of the Bioflect capris next treatment.   -AL       User Key  (r) = Recorded By, (t) = Taken By, (c) = Cosigned By    Initials Name Provider Type    Karey Thao PTA, CLRADHA-ARTEM Physical Therapy Assistant    HR Lacie Roberts, PT, DPT, CLT-ARTEM Physical Therapist             OP Exercises     Row Name 10/20/20 0800             Subjective Comments    Subjective Comments  She states she still has some R thigh pain, mostly lateral thigh  She states she still has the bump on the R anterior ankle, this as gotten larger and is tender to touch.  She will see Dr. Rose today about the swollen  lymph node L pablo and the raised area R anterior ankle.  She likes the Farrow Basic garment, she has ordered the compression leggings.   -AL         Subjective Pain    Able to rate subjective pain?  yes  -AL      Pre-Treatment Pain Level  2 With Tylenol  -AL      Post-Treatment Pain Level  0  -AL      Subjective Pain Comment  R thigh  -AL         Total Minutes    27273 - PT Manual Therapy Minutes  75  -AL        User Key  (r) = Recorded By, (t) = Taken By, (c) = Cosigned By    Initials Name Provider Type    Karey Thao PTA, CLT-ARTEM Physical Therapy Assistant                     Manual Rx (last 36 hours)      Manual Treatments     Row Name 10/20/20 0800             Total Minutes    08077 - PT Manual Therapy Minutes  75  -AL        User Key  (r) = Recorded By, (t) = Taken By, (c) = Cosigned By     Initials Name Provider Type    Karey Thao PTA, MIS Physical Therapy Assistant          PT OP Goals     Row Name 10/20/20 0800          PT Short Term Goals    STG Date to Achieve  10/06/20  -AL     STG 1  Patient will have basic understanding of lymphedema and its care.   -AL     STG 1 Progress  Ongoing;Progressing  -AL     STG 1 Progress Comments  Continues to improve  -AL     STG 2  Patient will be independent with remedial HEP.   -AL     STG 2 Progress  Ongoing;Progressing  -AL     STG 2 Progress Comments  Compliant with HEP  -AL        Long Term Goals    LTG Date to Achieve  10/22/20  -AL     LTG 1  Patient will be independent with self massage and family will be trained as well.   -AL     LTG 1 Progress  Ongoing;Progressing  -AL     LTG 1 Progress Comments  Compliant with MLD  -AL     LTG 2  She will have appropriate compression garments for BLEs with focus on RLE.  -AL     LTG 2 Progress  Progressing  -AL     LTG 2 Progress Comments  She now has the Farrow Basic garment for the R lower leg, she has ordered the Bioflect compression capris  -AL     LTG 3  Skin will improve in texture and have no s/s breakdown or infection.  -AL     LTG 3 Progress  Ongoing  -AL     LTG 3 Progress Comments  She has a raised area R anterior ankle she will see Dr. Rose about today.  -AL     LTG 4  She will be independent with a home maintenance program.   -AL     LTG 4 Progress  Ongoing  -AL     LTG 4 Progress Comments  She is working towards her home maintenance program  -AL        Time Calculation    PT Goal Re-Cert Due Date  11/19/20  -AL       User Key  (r) = Recorded By, (t) = Taken By, (c) = Cosigned By    Initials Name Provider Type    Karey Thao PTA, MIS Physical Therapy Assistant          Therapy Education  Education Details: Continue with CDT.    Given: Edema management  Program: New  How Provided: Verbal, Written  Provided to: Patient  Level of Understanding: Verbalized              Time  Calculation:                     Lacie Roberts, PT, DPT, CLT-ARTEM  10/20/2020

## 2020-10-20 NOTE — TELEPHONE ENCOUNTER
Left message for patient regarding low potassium level. Patient instructed to begin taking 20mEq potassium chloride by mouth daily for 2 weeks per Dr. Mustapha Royal. Patient encouraged to call back with any questions or concerns.

## 2020-10-27 ENCOUNTER — TREATMENT (OUTPATIENT)
Dept: PHYSICAL THERAPY | Facility: CLINIC | Age: 75
End: 2020-10-27

## 2020-10-27 DIAGNOSIS — I89.0 LYMPHEDEMA OF RIGHT LOWER EXTREMITY: Primary | ICD-10-CM

## 2020-10-27 PROCEDURE — 97140 MANUAL THERAPY 1/> REGIONS: CPT | Performed by: PHYSICAL THERAPIST

## 2020-10-27 NOTE — PROGRESS NOTES
Outpatient Physical Therapy Lymphedema Treatment Note       Patient Name: Mame Jean  : 1945  MRN: [id number]  Today's Date: 10/27/2020        Visit Date: 10/27/2020    Visit Dx:    ICD-10-CM ICD-9-CM   1. Lymphedema of right lower extremity  I89.0 457.1       Patient Active Problem List   Diagnosis   • Osteoporosis, unspecified        Lymphedema     Row Name 10/27/20 0800             Subjective Pain    Able to rate subjective pain?  yes  -AL      Pre-Treatment Pain Level  10  -AL      Subjective Pain Comment  R thigh and R groin, abdomen, R lateral trunk and into R back.   -AL         Subjective Comments    Subjective Comments  She is hurting more since her last treatment.  She states she is hurting  both groin areas and abdomen  Dr. Rose thinks she will have to start chemo again.  She states her leg is burning.  She states she thinks she has another swollen lymph node R groin area.  She will go to see Romulo at Dr. Jeronimo's office about a biopsy on the R anterior ankle.   -AL         Skin Changes/Observations    Location/Assessment  Lower Extremity  -AL      Lower Extremity Conditions  right:  -AL         Lymphedema Measurements    Measurement Type(s)  Circumferential  -AL      Circumferential Areas  Lower extremities  -AL         BLE Circumferential (cm)    Measurement Location 1  BOT  -AL      Right 1  19.8 cm  -AL      Measurement Location 2  +8  -AL      Right 2  23.8 cm  -AL      Measurement Location 3  +10  -AL      Right 3  21.4 cm  -AL      Measurement Location 4  +10  -AL      Right 4  29 cm  -AL      Measurement Location 5  +10  -AL      Right 5  30.4 cm  -AL      Measurement Location 6  +10  -AL      Right 6  37.7 cm  -AL      Measurement Location 7  +10  -AL      Right 7  41.2 cm  -AL      Measurement Location 8  +10  -AL      Right 8  45.2 cm  -AL      RLE Circumferential Total  248.5 cm  -AL         RLE Circumferential (cm)    Measurement Location 1  BOT  -AL      Measurement Location 2   +8  -AL      Measurement Location 3  +10  -AL      Measurement Location 4  +10  -AL      Measurement Location 5  +10  -AL      Measurement Location 6  +10  -AL      Measurement Location 7  +10  -AL      Measurement Location 8  +10  -AL         Manual Lymphatic Drainage    Manual Lymphatic Drainage  initial sequence;opened regional lymph nodes;opened anastamoses;extremity treatment  -AL      Initial Sequence  short neck;abdomen;diaphragmatic breathing  -AL      Abdomen  -- No superficial abdominals since abdomen is sore  -AL      Diaphragmatic Breathing  x 10  -AL      Opened Regional Lymph Nodes  axillary  -AL      Axillary  right  -AL      Opened Anastamoses  axillo-inguinal  -AL      Axillo-Inguinal  right  -AL      Extremity Treatment  MLD to full limb  -AL      MLD to Full Limb  R LE  -AL      Manual Lymphatic Drainage Comments  Applied less pressure to R upper leg  -AL      Manual Therapy  She has a Band-Aid on the R anterior ankle with minimal bloody drainage on the bandage.   -AL         Compression/Skin Care    Compression/Skin Care  compression garment  -AL      Skin Care  lotion applied  -AL      Compression Garment Comments  I coverd the R anterior ankle with a Mepilex, she then donned the compression capris, I donned the Hybrid sock and Farrow Basic garment.  -AL      Compression/Skin Care Comments  Raised area R anterior ankle measurements:  2.8 cm Length  x 2.4 cm width, 1.4 cm height.  The area is tender to touch, there is no drainage.  See media tab for photo.   -AL        User Key  (r) = Recorded By, (t) = Taken By, (c) = Cosigned By    Initials Name Provider Type    Karey Thao, PTA, CLT-ARTEM Physical Therapy Assistant                        PT Assessment/Plan     Row Name 10/27/20 0800          PT Assessment    Assessment Comments  She now has good compression for the R lower leg and compression for the bdomen down to the just below the knee.  The area R anterior ankle is now larger, see  media tab for photo.  This area has minimal bloody drainage today.  She will see the surgeon tomorrow about a biopsy.  She did feel beter after treatment today.    -AL        PT Plan    PT Plan Comments  Continue with CDT.   -AL       User Key  (r) = Recorded By, (t) = Taken By, (c) = Cosigned By    Initials Name Provider Type    Karey Thao, PTA, CLT-ARTEM Physical Therapy Assistant             OP Exercises     Row Name 10/27/20 0800             Subjective Comments    Subjective Comments  She is hurting more since her last treatment.  She states she is hurting  both groin areas and abdomen  Dr. Rose thinks she will have to start chemo again.  She states her leg is burning.  She states she thinks she has another swollen lymph node R groin area.  She will go to see Romulo at Dr. Jeronimo's office about a biopsy on the R anterior ankle.   -AL         Subjective Pain    Able to rate subjective pain?  yes  -AL      Pre-Treatment Pain Level  10  -AL      Subjective Pain Comment  R thigh and R groin, abdomen, R lateral trunk and into R back.   -AL         Total Minutes    70123 - PT Manual Therapy Minutes  75  -AL        User Key  (r) = Recorded By, (t) = Taken By, (c) = Cosigned By    Initials Name Provider Type    Karey Thao, PTA, CLT-ARTEM Physical Therapy Assistant                     Manual Rx (last 36 hours)      Manual Treatments     Row Name 10/27/20 0800             Total Minutes    00499 - PT Manual Therapy Minutes  75  -AL        User Key  (r) = Recorded By, (t) = Taken By, (c) = Cosigned By    Initials Name Provider Type    Karey Thao, PTA, CLT-ARTEM Physical Therapy Assistant          PT OP Goals     Row Name 10/27/20 0800          PT Short Term Goals    STG Date to Achieve  10/06/20  -AL     STG 1  Patient will have basic understanding of lymphedema and its care.   -AL     STG 1 Progress  Ongoing;Progressing  -AL     STG 1 Progress Comments  Improving  -AL     STG 2  Patient will be  independent with remedial HEP.   -AL     STG 2 Progress  Ongoing;Progressing  -AL        Long Term Goals    LTG Date to Achieve  10/22/20  -AL     LTG 1  Patient will be independent with self massage and family will be trained as well.   -AL     LTG 1 Progress  Ongoing;Progressing  -AL     LTG 1 Progress Comments  She has been working on the MLD  -AL     LTG 2  She will have appropriate compression garments for BLEs with focus on RLE.  -AL     LTG 2 Progress  Ongoing;Met  -AL     LTG 2 Progress Comments  She now has the Bioflext capris and Farrow basic garment with the Hybeid sock.  They are all a good fit   -AL     LTG 3  Skin will improve in texture and have no s/s breakdown or infection.  -AL     LTG 3 Progress  Ongoing  -AL     LTG 3 Progress Comments  She has the large raised area R anterior ankle which has a slight amount of bloody drainange.   -AL     LTG 4  She will be independent with a home maintenance program.   -AL     LTG 4 Progress  Ongoing  -AL        Time Calculation    PT Goal Re-Cert Due Date  11/19/20  -AL       User Key  (r) = Recorded By, (t) = Taken By, (c) = Cosigned By    Initials Name Provider Type    Karey Thao PTA, CLT-LANA Physical Therapy Assistant          Therapy Education  Education Details: Continue to work on CDT  Given: Edema management  Program: Reinforced  How Provided: Verbal  Provided to: Patient  Level of Understanding: Verbalized              Time Calculation:                     Karey Herrera PTA, CLT-LANA  10/27/2020

## 2020-10-28 NOTE — LETTER
Preop Orders:     Patient: Abelardo Rush  : 1945    Hospital:  [facility]    Admitting Physician:       Diagnosis:  Lymphoma  Mass rt foot  Left Inguinal lymphadenopathy    Procedure:  punch biopsy mass right foot  Ultrasound guided mammotone biopsy left groin     Anesthesia: General    Admission:Outpatient    Date: 11/3/20    Labs:per anesthesia      Pre-Op Meds:  Kefzol 1grm IV      Latex Allergy: no    Beta Blocker: no    Medication Instructions: No plavix for 2 weeks prior to procedure; no asprin for 3 days prior to procedure    This has been electronically signed by Sal Machado M.D.

## 2020-11-01 NOTE — PROGRESS NOTES
HISTORY OF PRESENT ILLNESS:  Anabel Mckinley is a 76 y.o. female who presents with a lesion to her right foot and also her left groin. 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 Her oncologist has sent her our way for biopsy of the right foot mass and the left groin mass. She denies any fevers or chills. Patient Active Problem List    Diagnosis Date Noted    Poor venous access 09/10/2020    Diffuse large B-cell lymphoma ([facility] [address] 08/17/2020    Antineoplastic chemotherapy induced pancytopenia (CODE) (HCC)      Hypercalcemia      Severe malnutrition ([facility] [address] 02/28/2020    Hypercalcemia of malignancy 02/27/2020    Dysphagia 02/27/2020    Unspecified b-cell lymphoma, lymph nodes of inguinal region and lower limb (HCC)     Body mass index (bmi) 26.0-26.9, adult     Lymphoma ([facility] 06/06/2019    Lymphadenopathy, inguinal 02/28/2019    Localized osteoporosis without current pathological fracture 09/09/2017    Essential hypertension 09/09/2017    Pure hypercholesterolemia 09/09/2017    IFG (impaired fasting glucose) 09/09/2017    Breast density 03/22/2016     Current Outpatient Medications   Medication Sig Dispense Refill    potassium chloride (KLOR-CON M) 20 MEQ extended release tablet Take 1 tablet by mouth daily for 14 days 14 tablet 0    riTUXimab (RITUXAN) 100 MG/10ML chemo injection Infuse 375 mg/m2 intravenously once      calcitonin (MIACALCIN) 200 UNIT/ACT nasal spray 1 spray by Nasal route daily 1 Bottle 5    lisinopril-hydroCHLOROthiazide (PRINZIDE;ZESTORETIC) 20-12.5 MG per tablet Take 1 tablet by mouth daily 90 tablet 3    mupirocin (BACTROBAN) 2 % ointment Apply to each nostril BID 5 days prior to surgery 1 Tube 0    megestrol (MEGACE) 40 MG/ML suspension Take 10 mLs by mouth daily 600 mL 1     No current facility-administered medications for this visit. Allergies: Patient has no known allergies.   Past Medical History:   Diagnosis Date    Body mass index noted.    CHEST:  Patient has normal respiratory effort. Chest is clear bilaterally with good thoracic expansion. HEART:  Heart demonstrated a regular rhythm and rate with no cardiac murmurs, gallops or rubs noted to auscultation. ABDOMEN:  Inspection of the abdomen demonstrated the patient to have normal bowel sounds present. The abdomen is soft and nontender with no hepatosplenomegaly, abdominal hernias or abdominal bruits. No costovertebral tenderness is noted to percussion bilaterally. EXTREMITIES:  Extremities demonstrated  Mass to the right foot about 3 cm wide. PSYCHIATRIC:  Patient is oriented to time, place and person. The patient's mood and affect are normal.      IMPRESSION:  B-cell lymphoma  Mass right foot and left groin lymphadenopathy    PLAN:  The risks, benefits, and options punch biopsy of the right foot and mammotome biopsy of the left groin were discussed with the patient. She  is willing to proceed with surgery.

## 2020-11-03 NOTE — ANESTHESIA PRE PROCEDURE
Department of Anesthesiology  Preprocedure Note       Name:  Kelvin Montero   Age:  76 y.o.  :  1945                                          MRN:  [id number]         Date:  11/3/2020      Surgeon: Stefani Dowling):  Autumn Shen MD    Procedure: [facility] ORTHOPEDIC BIOPSY MASS RIGHT FOOT (Right )  ULTRASOUND GUIDED BIOPSY LEFT GROIN (Left )    Medications prior to admission:   Prior to Admission medications    Medication Sig Start Date End Date Taking? Authorizing Provider   mupirocin (BACTROBAN) 2 % ointment Apply to each nostril BID 5 days prior to surgery 10/29/20   Autumn Shen MD   potassium chloride (KLOR-CON M) 20 MEQ extended release tablet Take 1 tablet by mouth daily for 14 days 10/20/20 11/3/20  Beatriz Vargas MD   riTUXimab (RITUXAN) 100 MG/10ML chemo injection Infuse 375 mg/m2 intravenously once    Historical Provider, MD   calcitonin (MIACALCIN) 200 UNIT/ACT nasal spray 1 spray by Nasal route daily 20   Milton Jesus MD   lisinopril-hydroCHLOROthiazide ORTIZ [facility]) 20-12.5 MG per tablet Take 1 tablet by mouth daily 20   Milton Jesus MD   megestrol (MEGACE) 40 MG/ML suspension Take 10 mLs by mouth daily 6/12/20 10/30/20  Beatriz Vargas MD       Current medications:    No current facility-administered medications for this visit. No current outpatient medications on file.      Facility-Administered Medications Ordered in Other Visits   Medication Dose Route Frequency Provider Last Rate Last Dose    acetaminophen (TYLENOL) tablet 975 mg  975 mg Oral Once Autumn Shen MD        ceFAZolin (ANCEF) 1 g in sterile water 10 mL IV syringe  1 g Intravenous Once Autumn Shen MD        celecoxib (CELEBREX) capsule 200 mg  200 mg Oral Once Autumn Shen MD        gabapentin (NEURONTIN) capsule 300 mg  300 mg Oral Once Autumn Shen MD           Allergies:  No Known Allergies    Problem List:    Patient Active Problem List   Diagnosis Code    Breast density R92.2    Localized osteoporosis without current pathological fracture M81.6    Essential hypertension I10    Pure hypercholesterolemia E78.00    IFG (impaired fasting glucose) R73.01    Lymphadenopathy, inguinal R59.0    Lymphoma (HCC) C85.90    Unspecified b-cell lymphoma, lymph nodes of inguinal region and lower limb (HCC) C85.15    Body mass index (bmi) 26.0-26.9, adult Z68.26    Hypercalcemia of malignancy E83.52    Dysphagia R13.10    Severe malnutrition (HCC) E43    Hypercalcemia  E83.52    Antineoplastic chemotherapy induced pancytopenia (CODE) (HCC)  D61.810    Diffuse large B-cell lymphoma (HCC) C83.30    Poor venous access I87.8       Past Medical History:        Diagnosis Date    Body mass index (bmi) 26.0-26.9, adult     Hypertension     Osteoporosis     Unspecified b-cell lymphoma, lymph nodes of inguinal region and lower limb [facility])        Past Surgical History:        Procedure Laterality Date    CHOLECYSTECTOMY      COLONOSCOPY  2009    Dr Betty Tatum, [address] [place] [address] Right 3/4/2019    EXCISION OF SUPERFICIAL LYMPH NODE, RIGHT GROIN performed by Jakub Boone DO at [place] [address]. Right 3/31/2020    PUNCH BIOPSY OF PUBIS performed by Luisa Senior MD at [address] [place] [address] N/A 3/31/2020    PORT INSERTION WITH FLUORO performed by Luisa Senior MD at [facility] OR       Social History:    Social History     Tobacco Use    Smoking status: Former Smoker     Packs/day: 1.00     Years: 2.00     Pack years: 2.00     Types: Cigarettes     Start date:      Last attempt to quit:      Years since quittin.8    Smokeless tobacco: Never Used    Tobacco comment: Retired from being a  and [address] [address] [employer]   Substance Use Topics    Alcohol use: No                                Counseling given: Not Answered  Comment: Retired from being a  and [address] [employer]      Vital Signs (Current):    There were no vitals filed for this visit.                                           BP Readings from Last 3 Encounters:   10/20/20 120/80   09/16/20 138/80   09/10/20 130/78       NPO Status:                                                                                 BMI:   Wt Readings from Last 3 Encounters:   10/30/20 117 lb (53.1 kg)   10/28/20 117 lb 9.6 oz (53.3 kg)   10/20/20 122 lb 12.8 oz (55.7 kg)     There is no height or weight on file to calculate BMI.    CBC:   Lab Results   Component Value Date    WBC 5.85 10/20/2020    RBC 3.96 10/20/2020    HGB 12.9 10/20/2020    HCT 39.0 10/20/2020    MCV 98.5 10/20/2020    RDW 12.7 10/20/2020    PLT 65 10/20/2020       CMP:   Lab Results   Component Value Date     10/20/2020    K 3.3 10/20/2020    K 3.1 03/01/2020    CL 97 10/20/2020    CO2 29 10/20/2020    BUN 17 10/20/2020    CREATININE 0.7 10/20/2020    GFRAA >59 09/08/2020    AGRATIO 1.5 01/15/2020    LABGLOM >60 10/20/2020    GLUCOSE 131 10/20/2020    PROT 7.0 10/20/2020    CALCIUM 10.2 10/20/2020    BILITOT 0.4 10/20/2020    ALKPHOS 129 10/20/2020    AST 44 10/20/2020    ALT 51 10/20/2020       POC Tests: No results for input(s): POCGLU, POCNA, POCK, POCCL, POCBUN, POCHEMO, POCHCT in the last 72 hours. Coags: No results found for: PROTIME, INR, APTT    HCG (If Applicable): No results found for: PREGTESTUR, PREGSERUM, HCG, HCGQUANT     ABGs: No results found for: PHART, PO2ART, MIO6HGQ, AVP3KPT, BEART, Y6XBYGLR     Type & Screen (If Applicable):  No results found for: LABABO, [address]    Anesthesia Evaluation  Patient summary reviewed and Nursing notes reviewed no history of anesthetic complications:   Airway: Mallampati: I  TM distance: >3 FB   Neck ROM: full  Mouth opening: > = 3 FB Dental: normal exam   (+) upper dentures      Pulmonary:normal exam  breath sounds clear to auscultation      (-) asthma, recent URI, sleep apnea and not a current smoker (quit 30 yrs ago)          Patient did not smoke on day of surgery. Cardiovascular:  Exercise tolerance: good (>4 METS),   (+) hypertension:, murmur, hyperlipidemia    (-) pacemaker, past MI, CABG/stent and  angina    NYHA Classification: I  ECG reviewed  Rhythm: regular  Rate: normal           Beta Blocker:  Not on Beta Blocker         Neuro/Psych:      (-) seizures, TIA, CVA and headaches           GI/Hepatic/Renal:        (-) GERD, liver disease and no renal disease       Endo/Other:    (+) blood dyscrasia: anemia and thrombocytopenia:., malignancy/cancer (Lymphoma). (-) diabetes mellitus, hypothyroidism, hyperthyroidism        Pt had PAT visit. Abdominal:           Vascular:                                          Anesthesia Plan      general     ASA 3     (Preop famotidine, dexamethasone)  Induction: intravenous. MIPS: Postoperative opioids intended and Prophylactic antiemetics administered. Anesthetic plan and risks discussed with patient. Use of blood products discussed with patient whom. Plan discussed with CRNA.                   Zion Meade MD   11/3/2020

## 2020-11-03 NOTE — ANESTHESIA POSTPROCEDURE EVALUATION
Department of Anesthesiology  Postprocedure Note    Patient: Anabel Mckinley  MRN: [id number]  YOB: 1945  Date of evaluation: 11/3/2020  Time:  9:24 AM     Procedure Summary     Date:  11/03/20 Room / Location:  [facility] OR [address] [place] [address]    Anesthesia Start:  [address] Anesthesia Stop:  3124    Procedures:       PUNCH BIOPSY MASS RIGHT FOOT (Right )      ULTRASOUND GUIDED BIOPSY LEFT GROIN (Left ) Diagnosis:  (LYMPHOMA, MASS RIGHT FOOT, LEFT INGUINAL LYMPHADENOPATHY)    Surgeon:  Rickey Arita MD Responsible Provider:  BEE Sellers CRNA    Anesthesia Type:  general ASA Status:  3          Anesthesia Type: general    Harley Phase I: Harley Score: 10    Harley Phase II:      Last vitals: Reviewed and per EMR flowsheets.        Anesthesia Post Evaluation    Patient location during evaluation: PACU  Patient participation: waiting for patient participation  Level of consciousness: sleepy but conscious  Pain score: 0  Airway patency: patent  Nausea & Vomiting: no nausea and no vomiting  Complications: no  Cardiovascular status: hemodynamically stable and blood pressure returned to baseline  Respiratory status: acceptable and room air  Hydration status: stable

## 2020-11-03 NOTE — H&P
HISTORY OF PRESENT ILLNESS:  Sunshine Rosas is a 76 y.o. female who presents with a lesion to her right foot and also her left groin. 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 Her oncologist has sent her our way for biopsy of the right foot mass and the left groin mass. She denies any fevers or chills.          Patient Active Problem List     Diagnosis Date Noted    Poor venous access 09/10/2020    Diffuse large B-cell lymphoma ([facility] 08/17/2020    Antineoplastic chemotherapy induced pancytopenia (CODE) (HCC)       Hypercalcemia       Severe malnutrition ([facility] 02/28/2020    Hypercalcemia of malignancy 02/27/2020    Dysphagia 02/27/2020    Unspecified b-cell lymphoma, lymph nodes of inguinal region and lower limb ([facility]      Body mass index (bmi) 26.0-26.9, adult      Lymphoma ([facility] 06/06/2019    Lymphadenopathy, inguinal 02/28/2019    Localized osteoporosis without current pathological fracture 09/09/2017    Essential hypertension 09/09/2017    Pure hypercholesterolemia 09/09/2017    IFG (impaired fasting glucose) 09/09/2017    Breast density 03/22/2016      Current Facility-Administered Medications          Current Outpatient Medications   Medication Sig Dispense Refill    potassium chloride (KLOR-CON M) 20 MEQ extended release tablet Take 1 tablet by mouth daily for 14 days 14 tablet 0    riTUXimab (RITUXAN) 100 MG/10ML chemo injection Infuse 375 mg/m2 intravenously once        calcitonin (MIACALCIN) 200 UNIT/ACT nasal spray 1 spray by Nasal route daily 1 Bottle 5    lisinopril-hydroCHLOROthiazide (PRINZIDE;ZESTORETIC) 20-12.5 MG per tablet Take 1 tablet by mouth daily 90 tablet 3    mupirocin (BACTROBAN) 2 % ointment Apply to each nostril BID 5 days prior to surgery 1 Tube 0    megestrol (MEGACE) 40 MG/ML suspension Take 10 mLs by mouth daily 600 mL 1      No current facility-administered medications for this visit.          Allergies: Patient has no known allergies. Past Medical History        Past Medical History:   Diagnosis Date    Body mass index (bmi) 26.0-26.9, adult      Hypertension      Osteoporosis      Unspecified b-cell lymphoma, lymph nodes of inguinal region and lower limb [facility])          Past Surgical History         Past Surgical History:   Procedure Laterality Date    CHOLECYSTECTOMY        COLONOSCOPY   2009     Dr Melissa Nesbitt, TOTAL ABDOMINAL        LYMPH NODE BIOPSY Right 3/4/2019     EXCISION OF SUPERFICIAL LYMPH NODE, RIGHT GROIN performed by Cesario Wade DO at [facility]. Right 3/31/2020     PUNCH BIOPSY OF PUBIS performed by Atif Perdue MD at [address] N/A 3/31/2020     PORT INSERTION WITH FLUORO performed by Atif Perdue MD at [facility] History         Family History   Problem Relation Age of Onset    Lung Cancer Mother           not a smoker    Tuberculosis Father          Social History            Tobacco Use    Smoking status: Former Smoker       Packs/day: 1.00       Years: 2.00       Pack years: 2.00       Types: Cigarettes       Start date:        Last attempt to quit:        Years since quittin.8    Smokeless tobacco: Never Used    Tobacco comment: Retired from being a  and [address] [employer]   Substance Use Topics    Alcohol use: No         Radiology  A moderate increase in size of the right iliac fossa    mass/combination of masses seen in the previous study. An abnormal enlarged left inguinal lymph node which was barely visible    in the previous study. The Decrease in size of the liver lesions as detailed above.         Review of Systems  Review of system reviewed and positive for the above.   All other systems noted be negative.     Physical Exam  Pulse 114, temperature 97.7 °F (36.5 °C), temperature source Temporal, height 4' 11\" (1.499 m), weight 117 lb 9.6 oz (53.3 kg), SpO2 99 %, not currently breastfeeding.     GENERAL:  Reveals a 76 y.o. female that  appears to be in no acute distress.     HEENT:  Head is normocephalic and atraumatic.     NECK:  Neck is supple without masses or carotid bruits. No obvious thyromegaly is grossly noted.     CHEST:  Patient has normal respiratory effort. Chest is clear bilaterally with good thoracic expansion.       HEART:  Heart demonstrated a regular rhythm and rate with no cardiac murmurs, gallops or rubs noted to auscultation.     ABDOMEN:  Inspection of the abdomen demonstrated the patient to have normal bowel sounds present. The abdomen is soft and nontender with no hepatosplenomegaly, abdominal hernias or abdominal bruits. No costovertebral tenderness is noted to percussion bilaterally.           EXTREMITIES:  Extremities demonstrated  Mass to the right foot about 3 cm wide.        PSYCHIATRIC:  Patient is oriented to time, place and person. The patient's mood and affect are normal.        IMPRESSION:  B-cell lymphoma  Mass right foot and left groin lymphadenopathy     PLAN:  The risks, benefits, and options punch biopsy of the right foot and mammotome biopsy of the left groin were discussed with the patient. She  is willing to proceed with surgery.

## 2020-11-03 NOTE — PROGRESS NOTES
Regimen:  Regractory lymphoma DLBC:  Bendamustine 80 mg/m2 intravenously (IV) on days 2 and 3 of cycle 1 and then days 1 and 2 of subsequent cycles  Rituximab IV (375 mg/m2 on day 1 of each cycle)   Polatuzumab vedotin 1.8 mg/kg IV on day 2 of cycle 1 and day 1 of subsequent cycles. 6 cycles every 21-day cycles.   Treatment is palliative

## 2020-11-03 NOTE — TELEPHONE ENCOUNTER
Patient instructed to begin taking 300mg allopurinol by mouth daily per Dr. Lilli Swanson. Patient verbalized understanding and denied questions or concerns at this time.

## 2020-11-03 NOTE — BRIEF OP NOTE
Brief Postoperative Note      DATE OF PROCEDURE: 11/3/2020     SURGEON: Cristy Heredia MD    PREOPERATIVE DIAGNOSIS:  LYMPHOMA, MASS RIGHT FOOT, LEFT INGUINAL LYMPHADENOPATHY    POSTOPERATIVE DIAGNOSIS: Same     OPERATION: Procedure(s):  PUNCH BIOPSY MASS RIGHT FOOT  ULTRASOUND GUIDED BIOPSY LEFT GROIN    ANESTHESIA: General    ESTIMATED BLOOD LOSS: Minimal    COMPLICATIONS: None. SPECIMENS:   ID Type Source Tests Collected by Time Destination   A : right foot lesion Tissue Foot SURGICAL PATHOLOGY Cristy Heredia MD 11/3/2020 0902    B : left groin node, hx of lymphoma Tissue Groin SURGICAL PATHOLOGY Cristy Heredia MD 11/3/2020 9263        DRAINS: None    The patient tolerated the procedure well.     Electronically signed by Cristy Heredia MD  on 11/3/2020 at 10:04 AM

## 2020-11-04 NOTE — OP NOTE
[facility] [address], [address] [facility]                                OPERATIVE REPORT    PATIENT NAME: Deann Monreal                     :        1945  MED REC NO:   [id number]                              ROOM:  ACCOUNT NO:   [de-identified]                           ADMIT DATE: 2020  PROVIDER:     Jatin Baptiste MD    DATE OF PROCEDURE:  2020    PREOPERATIVE DIAGNOSES:  Progressing B-cell lymphoma with left inguinal  adenopathy and right foot lesion. POSTOPERATIVE DIAGNOSES:  Progressing B-cell lymphoma with left inguinal  adenopathy and right foot lesion. PROCEDURES:  1. Punch biopsy, right foot lesion. 2.  Ultrasound-guided needle biopsy, left groin node. SURGEON:  Jatin Baptiste MD    ASSISTANT:  Sayra Simons PA-C    ANESTHESIA:  General.    INDICATIONS:  The patient is a 80-year-old lady with known lymphoma. She was progressing on therapy. We were asked to see her. In addition,  she developed a new marked increase in the size of the left groin node  as well as a lesion on the dorsum of her right foot that actually looks  more like a squamous cancer and we were asked to biopsy that as well. We discussed the risks and benefits with her and her family. They  understand and are agreeable. OPERATIVE PROCEDURE:  Today, she was brought to the operating room,  underwent adequate general anesthesia, prepped and draped in the sterile  fashion. We initially approached the foot. We attempted a 2 mm punch  and this really did not get a very good bit, so we excised a small wedge  of tissue. It had the fish flesh appearance of lymphoma and then we  held pressure for hemostasis. We then turned to the groin. She had  about a 4 cm groin node and using ultrasound, we took multiple cores  with the 14-gauge biopsy gun. It appeared to have adequate specimen. We held compression for hemostasis.   I reviewed the specimens with  pathology. The foot lesion was touch prepped and appeared to be  compatible with her previous lymphoma. The groin specimens were sent  for flow cytometry. Estimated blood loss, minimal.  Complications,  none. She tolerated this quite well.           Irvin Jacome MD    D: 11/03/2020 11:21:21      T: 11/03/2020 14:43:29     [email or web address]  Job#: [id number]     Doc#: [id number]    CC:

## 2020-11-05 NOTE — PROGRESS NOTES
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 deletion. The patient has a very high burden of disease initially with significant inguinal bilateral adenopathy. Initial biopsy was consistent with his SLL. The patient received 2 lines of therapy for SLL with poor response to include Imbruvica followed by [facility] and venetoclax. Therefore, a repeat biopsy was performed which was compatible with high-grade lymphoma. The patient was then recommended chemotherapy with R-CHOP regimen and has completed 6 cycles of treatment. PET CT scan showed mixed results. She received 1 cycle of maintenance rituximab. Essentially, disease progression. Recommended bendamustine, rituximab and polatuzumab. Hematology history  Ms Triny Mendieta was first seen by me on 3/27/2019 referred by Dr. Nelda Serna for a diagnosis of B-cell lymphoma. The patient had a history of a right inguinal adenopathy that was treated with antibiotic without resolution. Therefore a biopsy was performed. 12/13/2018-CT abdomen pelvis showed a moderately prominent right inguinal lymph node.  In addition, an oval solid nodule in the left labium/left vagina wall measures 2.5 x 1.6 cm.   2/20/2019-the patient was seen by Dr. Mayank Abdul, who requested an ultrasound of the pelvis. 2/28/2019-US pelvis showed a hypoechoic 2.3 x 2.1 x 2.1 cm area within the vagina concerning for Bartholin's cyst.   2/28/2019-she was seen by Dr. Maciel from General surgery with complains of enlarging right inguinal adenopathy, and therefore was recommended FNA biopsy   3/4/2019-FNA biopsy consistent B-cell lymphoma on morphology and IHC (positive for CD3, CD5 and CD20 and negative for BCL-2, BCL 6, cyclin D1, CD30 and MUM-1. Extensive necrosis, and therefore, further characterization was not possible. 3/27/2019-she was first seen by me. 4/3/2019-bone marrow biopsy/aspirate showed involvement by a cyclin D1 negative, CD5/CD23 positive monoclonal B-cell population consistent with involvement of bone marrow by B-cell SLL (10%). CD20 positive. Normal female karyotype 55 XX. FISH cytogenetics 13 q deletion   4/8/2019-CT chest showed no evidence of mediastinal, hilar adenopathy. 4/9/2019-PET scan showed FDG avid enlarged right pelvic and inguinal lymph nodes. Suggestive for active lymphoma. Specifically, a right obturator lymph node with SUV 23. Right external iliac lymph node with SUV 27. Right inguinal lymph node with SUV 11. May through November 2019-Imbruvica with progressive disease or poor tolerance. 12/3/2019- venetoclax/Gazyva fixed duration treatment 1 year expected. 3/16/2020-CT chest abdomen pelvis showed no evidence of lymphadenopathy in the chest. Marked interval progression of disease in the abdomen and pelvis. Lesions are seen in the liver, pancreas, periportal region, left retroperitoneal area, and bilateral inguinal chains. There is also enlargement of previously seen right pelvic lymph nodes. 3/17/2020- core biopsy of right inguinal lymph node consistent with high-grade lymphoma. Flow cytometry positive for CD20 and BCL 6 and negative for CD5.   BCL-2 mostly negative. Ki-67 95%. Findings consistent with high-grade B-cell lymphoma. 3/20/2020- recommended 6 cycles of R-CHOP.  5/29/2020- Ct Chest/Abdomen/Pelvis W Contrast A right cardiophrenic angle lymph node is smaller on the current study. There is no other lymphadenopathy in the chest.Scattered tiny subpleural pulmonary nodules are likely postinfectious or postinflammatory. They are stable in appearance. There are no new pulmonary nodules. Scattered tiny nodules in both thyroid lobes measuring up to 3 or 4 mm. Positive treatment response with decreased size of liver lesions and decrease in abdominopelvic lymphadenopathy. 7/20/2020 CT Chest/Abdomen/Pelvis-The decrease in size of an abnormal right cardiophrenic sulcus lymph node since the previous study. No other abnormal lymph node in the mediastinum and marcelo are noted. Several scattered tiny subpleural nodules in both lungs, more numerous in the right upper lung are stable since the previous study and may represent a benign process. Continued decrease in liver lesions and lymphadenopathy. Findings are consistent with continued treatment response when compared to CT from 5/29/2020. Mild RIGHT hydronephrosis, likely related to ureteral compression by pelvic adenopathy. 7/20/2020 Pet Ct Skull Base To Mid Thigh- There are hypermetabolic liver lesions as well hypermetabolic partially calcified lymph nodes within the periportal region and the right pelvic wall as well as bilateral inguinal regions. The greatest intensity of uptake is along the lateral margin of the enlarged right common femoral lymph node. Please refer to dictation above. Findings represent progression compared to the previous PET exam of 4/19/2019. Please refer to diagnostic CT exams from today to compare to recent CT studies of 5/29/2020. Moderate right-sided hydronephrosis secondary to the lymph node mass within the right lower pelvis. Focus of increased FDG uptake of the right perineum/labia. This may be urinary contamination, however clinical assessment of the region recommended to exclude a soft tissue nodule. 8/17/2020-discussed the results of her CT scan/PET scan. Overall interval improvement on her CT scans but persistent SUV. I believe that the persistent SUV may be related to a low-grade component. Therefore I have recommended maintenance rituximab subcutaneously every 2 months x12 cycles. 9/10/2020- maintenance rituximab subcutaneous x2 years  10/20/2020-interval increase of bilateral inguinal adenopathy. This is concerning for recurrent lymphoma. 10/26/20 Ct Chest W Contrast  A right cardiophrenic lymph node is smaller on the current study with size measurements listed above. No other evidence of lymphadenopathy. There is a tiny new 3 mm subpleural right lower lobe nodule on image 77 of series 4. Continued follow-up recommended. Subpleural nodules are often postinflammatory/postinfectious. Malignant disease is not fully excluded. Coronary artery calcification. Minimal atheromatous disease of the thoracic aorta. Thickening of the left ventricular myocardium. 10/26/20 Ct Abdomen Pelvis W Iv Contrast  A moderate increase in size of the right iliac fossa mass/combination of masses seen in the previous study. An abnormal enlarged left inguinal lymph node which was barely visible in the previous study. The Decrease in size of the liver lesions as detailed above. 11/3/20 Soft tissue, right foot lesion, punch biopsy:   Positive for involvement by diffuse large B-cell lymphoma. 11/3/20- Lymph node, left groin, core biopsy: Positive for involvement by diffuse large B-cell lymphoma. FISH started positive for amplification of BCL6, MYC and BCL-2. No MYC/IGH and IGH/BCL2.   11/9/2020-second line therapy with polatuzumab, bendamustine rituximab.     Past medical history:  Past Medical History:   Diagnosis Date    Body mass index (bmi) 26.0-26.9, adult     Hypertension     Osteoporosis     Unspecified b-cell lymphoma, lymph nodes of inguinal region and lower limb [facility])         Past surgical history:  Past Surgical History:   Procedure Laterality Date    CHOLECYSTECTOMY      COLONOSCOPY  2009    Dr Ira Lei Right 11/3/2020    PUNCH BIOPSY MASS RIGHT FOOT [facility] LYMPH NODE BIOPSY Right 3/4/2019    EXCISION OF SUPERFICIAL LYMPH NODE, RIGHT GROIN performed by Sherwin Hector DO at [facility] Left 11/3/2020    ULTRASOUND GUIDED BIOPSY LEFT GROIN performed by Austin Cadet MD at [facility] [address] Right 3/31/2020    PUNCH BIOPSY OF PUBIS performed by Austin Cadet MD at [address] [place] [address] N/A 3/31/2020    PORT INSERTION WITH FLUORO performed by Austin Cadet MD at [address] history:  Social History     Socioeconomic History    Marital status:       Spouse name: Not on file    Number of children: Not on file    Years of education: Not on file    Highest education level: Not on file   Occupational History    Not on file   Social Needs    Financial resource strain: Not on file    Food insecurity     Worry: Not on file     Inability: Not on file    Transportation needs     Medical: Not on file     Non-medical: Not on file   Tobacco Use    Smoking status: Former Smoker     Packs/day: 1.00     Years: 2.00     Pack years: 2.00     Types: Cigarettes     Start date: 65     Last attempt to quit: 1977     Years since quittin.8    Smokeless tobacco: Never Used    Tobacco comment: Retired from being a  and [address] [place] [address]   Substance and Sexual Activity    Alcohol use: No    Drug use: No    Sexual activity: Never   Lifestyle    Physical activity     Days per week: Not on file     Minutes per session: Not on file    Stress: Not on file   Relationships    Social connections     Talks on phone: Not on file     Gets together: Not on file mg/m2 (Treatment Plan Recorded) Intravenous Once Ally Nelson MD        heparin flush 100 UNIT/ML injection 500 Units  500 Units Intracatheter PRN Ally Nelson MD        sodium chloride flush 0.9 % injection 10 mL  10 mL Intravenous PRN Ally Nelson MD            REVIEW OF SYSTEMS:    Constitutional: no fever, no night sweats,  fatigue, weight loss  HEENT: no blurring of vision, no double vision, no hearing difficulty, no tinnitus,no ulceration, no dental caries, no dysphagia  Lungs: no cough, no shortness of breath, no wheeze;   CVS: no palpitation, no chest pain, no shortness of breath;  GI: no abdominal pain, no nausea , no vomiting, no constipation; diarrhea  GABE: no dysuria, frequency and urgency, no hematuria, no kidney stones;   Musculoskeletal: no joint pain, swelling , stiffness;   endocrine: no polyuria, polydypsia, no cold or heat intolerence; Hematology/lymphatic: no easy brusing or bleeding, no hx of clotting disorder;  Right/left inguinal adenopathy. Dermatology: skin lesion in the pubic area. No eczema, no pruritis;   Psychiatry: no depression, no anxiety,no panic attacks, no suicide ideation; Neurology: no syncope, no seizures, no numbness or tingling of hands, no numbness or tingling of feet, no paresis;     PHYSICAL EXAM:    Vitals signs:  /70   Pulse 101   Temp 97.7 °F (36.5 °C)   LMP  (LMP Unknown)   SpO2 99%   Pain scale:0      CONSTITUTIONAL: Alert, appropriate, no acute distress, chronically appearing  EYES: Non icteric, EOM intact, pupils equal round and reactive to light and accommodation. ENT: Oral mucus membranes moist, no oral pharyngeal lesions. External inspection of ears and nose are normal.   NECK: Supple, no masses. No palpable thyroid mass    CHEST/LUNGS: CTA bilaterally, normal respiratory effort   CARDIOVASCULAR: Tachycardic, RRR, no murmurs. No lower extremity edema   ABDOMEN: soft non-tender, active bowel sounds, no hepatosplenomegaly. No palpable masses. EXTREMITIES: warm, Full ROM of all fours extremities. No focal weakness. SKIN: warm, dry with no rashes or lesions  LYMPH: Bilateral palpable inguinal adenopathy, no cervical, clavicular, axillary lymphadenopathy  NEUROLOGIC: follows commands, non focal.   PSYCH: mood and affect appropriate. Alert and oriented to time and place and person. Relevant Lab findings:  10/20/2020       11/3/20 Soft tissue, right foot lesion, punch biopsy:   Positive for involvement by diffuse large B-cell lymphoma. 11/3/20 Lymph node, left groin, core biopsy:   Positive for involvement by diffuse large B-cell lymphoma. Relevant Imaging studies:   Ct Chest W Contrast:    Result Date: 10/26/2020  1. A right cardiophrenic lymph node is smaller on the current study with size measurements listed above. No other evidence of lymphadenopathy. 2. There is a tiny new 3 mm subpleural right lower lobe nodule on image 77 of series 4. Continued follow-up recommended. Subpleural nodules are often postinflammatory/postinfectious. Malignant disease is not fully excluded. 3. Coronary artery calcification. Minimal atheromatous disease of the thoracic aorta. Thickening of the left ventricular myocardium. Signed by Dr Greta Blevins on 10/26/2020 10:19 AM    Ct Abdomen Pelvis W Iv Contrast Additional Contrast? Oral    Result Date: 10/26/2020  A moderate increase in size of the right iliac fossa mass/combination of masses seen in the previous study. An abnormal enlarged left inguinal lymph node which was barely visible in the previous study. The Decrease in size of the liver lesions as detailed above.  Signed by Dr Darren Jay on 10/26/2020 11:06 AM      ASSESSMENT:    Orders Placed This Encounter   Procedures    Uric Acid     Standing Status:   Future     Number of Occurrences:   1     Standing Expiration Date:   11/9/2021    Lactate Dehydrogenase     Standing Status:   Future     Number of Occurrences:   1 Standing Expiration Date:   11/9/2021      Kathie Sheffield was seen today for lymphoma. Diagnoses and all orders for this visit:    Chemotherapy management, encounter for  -     megestrol (MEGACE) 40 MG/ML suspension; Take 10 mLs by mouth daily    Diffuse large b-cell lymphoma, lymph nodes of multiple sites ([facility] [address]   -     Uric Acid; Future  -     Lactate Dehydrogenase; Future  -     megestrol (MEGACE) 40 MG/ML suspension; Take 10 mLs by mouth daily    Care plan discussed with patient    Adverse effect of chemotherapy, subsequent encounter    Encounter for antineoplastic immunotherapy    Cancer related pain  -     Discontinue: HYDROcodone-acetaminophen (NORCO) 5-325 MG per tablet; Take 1 tablet by mouth every 4 hours as needed for Pain for up to 3 days. Intended supply: 3 days. Take lowest dose possible to manage pain  -     HYDROcodone-acetaminophen (NORCO) 5-325 MG per tablet; Take 1 tablet by mouth every 6 hours as needed for Pain for up to 30 days. Intended supply: 3 days. Take lowest dose possible to manage pain       #High-grade Lymphoma-DLBCL No BCL-2, BCL 6, cMyc rearrangement. Ritcher's transformation- biopsy consistent with high-grade B-cell lymphoma  She received 6 cycles of R-CHOP completed 7/06/2020  CT scan showed interval response to treatment but PET scan showed persistent uptake in the liver. Essentially, refractory disease with persistent liver disease and right inguinal.    11/09/2020-initiation of second line therapy with bendamustine, rituximab and polatuzumab    At risk for tumor lysis syndrome -allopurinol 300 g daily. Rasburicase will be given today. #B-SLL stage IV 13 q deletion   Essentially, the patient has B-cell lymphoma SLL with symptomatic lymphadenopathy. No response to frontline Imbruvica or Ernst Galla. The patient Ricther's transformation. Therefore consistent with high-grade lymphoma.   She has completed 6 cycles of R-CHOP and has been recommended maintenance with rituximab based on her prior B-cell SLL component. Now with progressive disease. #Treatment related toxicity- fatigue. Bone marrow suppression, thrombocytopenia    #Right lower extremity edema-likely secondary to compressive inguinal adenopathy/Baker's cyst on the right. No signs of DVT on prior vascular study 6/18/2019. She continued to have significant leg swelling in the right lower extremity. #Hypertension-controlled. Follow-up with PCP. #Transaminitis- 2/2 lymphoma. Stable. #Macrocytic anemia-secondary to chemotherapy. Hemoglobin 12.3  Improving. #Thrombocytopenia-secondary to chemotherapy. 107,000. Plan:  CMP, LDH today  Rasburicase today  Allopurinol 300 mg p.o. daily  RTC 3 weeks MD      Return in about 3 weeks (around 11/30/2020) for See Dr. Abraham Quinteros in [address]. Data Lanetta Essex, am scribing for Jena Madrid MD. Electronically signed by Dayday Tello RN on 11/9/2020 at 2:10 PM CST. I, Dr Little Santamaria, personally performed the services described in this documentation as scribed by Dayday Tello RN in my presence and is both accurate and complete. I have seen, examined and reviewed this patient medication list, appropriate labs and imaging studies. I reviewed relevant medical records and others physicians notes. I discussed the plans of care with the patient. I answered all the questions to the patients satisfaction. (Please note that portions of this note were completed with a voice recognition program. Efforts were made to edit the dictations but occasionally words are mis-transcribed.)  Over 50% of the total visit time of 25 minutes in face to face encounter with the patient, out of which more than 50% of the time was spent in counseling patient or family and coordination of care. Counseling included but was not limited to time spent reviewing labs, imaging studies/ treatment plan and answering questions.

## 2020-11-25 NOTE — PROGRESS NOTES
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 deletion. The patient has a very high burden of disease initially with significant inguinal bilateral adenopathy. Initial biopsy was consistent with his SLL. The patient received 2 lines of therapy for SLL with poor response to include Imbruvica followed by Sean Nickel and venetoclax. Therefore, a repeat biopsy was performed which was compatible with high-grade lymphoma. The patient was then recommended chemotherapy with R-CHOP regimen and has completed 6 cycles of treatment. PET CT scan showed mixed results. She received 1 cycle of maintenance rituximab. Essentially, disease progression. She was recommended bendamustine, rituximab and polatuzumab. She presents today for cycle #2. She reports improvement of the left inguinal adenopathy but stable to mildly worsening right inguinal adenopathy. She also reports that the soft tissue mass in the dorsal aspect of the right foot has grown. This is confirmed diffuse large B-cell lymphoma involvement of the skin.     Hematology history  Ms Sourav Puentes was first seen by me on 3/27/2019 referred by Dr. Maciel pancreas, periportal region, left retroperitoneal area, and bilateral inguinal chains. There is also enlargement of previously seen right pelvic lymph nodes. 3/17/2020- core biopsy of right inguinal lymph node consistent with high-grade lymphoma. Flow cytometry positive for CD20 and BCL 6 and negative for CD5. BCL-2 mostly negative. Ki-67 95%. Findings consistent with high-grade B-cell lymphoma. 3/20/2020- recommended 6 cycles of R-CHOP.  5/29/2020- Ct Chest/Abdomen/Pelvis W Contrast A right cardiophrenic angle lymph node is smaller on the current study. There is no other lymphadenopathy in the chest.Scattered tiny subpleural pulmonary nodules are likely postinfectious or postinflammatory. They are stable in appearance. There are no new pulmonary nodules. Scattered tiny nodules in both thyroid lobes measuring up to 3 or 4 mm. Positive treatment response with decreased size of liver lesions and decrease in abdominopelvic lymphadenopathy. 7/20/2020 CT Chest/Abdomen/Pelvis-The decrease in size of an abnormal right cardiophrenic sulcus lymph node since the previous study. No other abnormal lymph node in the mediastinum and marcelo are noted. Several scattered tiny subpleural nodules in both lungs, more numerous in the right upper lung are stable since the previous study and may represent a benign process. Continued decrease in liver lesions and lymphadenopathy. Findings are consistent with continued treatment response when compared to CT from 5/29/2020. Mild RIGHT hydronephrosis, likely related to ureteral compression by pelvic adenopathy. 7/20/2020 Pet Ct Skull Base To Mid Thigh- There are hypermetabolic liver lesions as well hypermetabolic partially calcified lymph nodes within the periportal region and the right pelvic wall as well as bilateral inguinal regions. The greatest intensity of uptake is along the lateral margin of the enlarged right common femoral lymph node. Please refer to dictation above. Findings represent progression compared to the previous PET exam of 4/19/2019. Please refer to diagnostic CT exams from today to compare to recent CT studies of 5/29/2020. Moderate right-sided hydronephrosis secondary to the lymph node mass within the right lower pelvis. Focus of increased FDG uptake of the right perineum/labia. This may be urinary contamination, however clinical assessment of the region recommended to exclude a soft tissue nodule. 8/17/2020-discussed the results of her CT scan/PET scan. Overall interval improvement on her CT scans but persistent SUV. I believe that the persistent SUV may be related to a low-grade component. Therefore I have recommended maintenance rituximab subcutaneously every 2 months x12 cycles. 9/10/2020- maintenance rituximab subcutaneous x2 years  10/20/2020-interval increase of bilateral inguinal adenopathy. This is concerning for recurrent lymphoma. 10/26/20 Ct Chest W Contrast  A right cardiophrenic lymph node is smaller on the current study with size measurements listed above. No other evidence of lymphadenopathy. There is a tiny new 3 mm subpleural right lower lobe nodule on image 77 of series 4. Continued follow-up recommended. Subpleural nodules are often postinflammatory/postinfectious. Malignant disease is not fully excluded. Coronary artery calcification. Minimal atheromatous disease of the thoracic aorta. Thickening of the left ventricular myocardium. 10/26/20 Ct Abdomen Pelvis W Iv Contrast  A moderate increase in size of the right iliac fossa mass/combination of masses seen in the previous study. An abnormal enlarged left inguinal lymph node which was barely visible in the previous study. The Decrease in size of the liver lesions as detailed above. 11/3/20 Soft tissue, right foot lesion, punch biopsy:   Positive for involvement by diffuse large B-cell lymphoma.    11/3/20- Lymph node, left groin, core biopsy: Positive for involvement by diffuse large Supple, no masses. No palpable thyroid mass    CHEST/LUNGS: CTA bilaterally, normal respiratory effort   CARDIOVASCULAR: Tachycardic, RRR, no murmurs. No lower extremity edema   ABDOMEN: soft non-tender, active bowel sounds, no hepatosplenomegaly. No palpable masses. EXTREMITIES: warm, Full ROM of all fours extremities. No focal weakness. SKIN: warm, dry with no rashes or lesions  LYMPH: Bilateral palpable inguinal adenopathy, no cervical, clavicular, axillary lymphadenopathy  NEUROLOGIC: follows commands, non focal.   PSYCH: mood and affect appropriate. Alert and oriented to time and place and person. Relevant Lab findings:  11/9/2020     Uric acid 2.0    Lab Results   Component Value Date    WBC 8.01 11/30/2020    HGB 11.1 (L) 11/30/2020    HCT 32.2 (L) 11/30/2020    MCV 94.2 11/30/2020    PLT 53 (L) 11/30/2020     Lab Results   Component Value Date    NEUTROABS 5.74 11/30/2020     Lab Results   Component Value Date     11/30/2020    K 4.0 11/30/2020     11/30/2020    CO2 27 11/30/2020    BUN 18 (H) 11/30/2020    CREATININE 0.5 11/30/2020    GLUCOSE 98 11/30/2020    CALCIUM 10.3 (H) 11/30/2020    PROT 6.7 11/30/2020    LABALBU 4.0 11/30/2020    BILITOT 0.6 11/30/2020    ALKPHOS 143 (H) 11/30/2020    AST 52 (H) 11/30/2020    ALT 42 11/30/2020    LABGLOM >60 11/30/2020    GFRAA >59 09/08/2020    AGRATIO 1.5 01/15/2020    GLOB 2.7 11/30/2020       Relevant Imaging studies:   No results found. ASSESSMENT:    Orders Placed This Encounter   Procedures    Lactate Dehydrogenase     Standing Status:   Future     Number of Occurrences:   1     Standing Expiration Date:   11/30/2021    Amb External Referral To Radiation Oncology     Referral Priority:   Routine     Referral Reason:   Specialty Services Required     Referred to Provider:   Lefty Cox     Requested Specialty:   Radiology     Number of Visits Requested:   1      Devan Contreras was seen today for cancer and chemotherapy.     Diagnoses and all orders for this visit:    Chemotherapy management, encounter for    Thrombocytopenia [facility])    Encounter for antineoplastic immunotherapy    Adverse effect of chemotherapy, subsequent encounter    Care plan discussed with patient    Diffuse large b-cell lymphoma, lymph nodes of multiple sites ([facility] [address]   -     Amb External Referral To Radiation Oncology  -     Lactate Dehydrogenase; Future    Coordination of complex care    Lymphedema       #High-grade Lymphoma-DLBCL No BCL-2, BCL 6, cMyc rearrangement. Ritcher's transformation- biopsy consistent with high-grade B-cell lymphoma  She received 6 cycles of R-CHOP completed 7/06/2020  CT scan showed interval response to treatment but PET scan showed persistent uptake in the liver. Essentially, refractory disease with persistent liver disease and right inguinal.  11/09/2020-initiation of second line therapy with bendamustine, rituximab and polatuzumab.  11/30/2020-Delay 1 week chemotherapy due to thrombocytopenia\  Discussed with radiation oncology. Recommeded palliative radiation to the right groin and soft tissue nodule in the dorsal aspect of the right foot. At risk for tumor lysis syndrome -allopurinol 300 mg daily. Rasburicase will be given today. #B-SLL stage IV 13 q deletion   Essentially, the patient has B-cell lymphoma SLL with symptomatic lymphadenopathy. No response to frontline Imbruvica or Leita Dayanna. The patient Ricther's transformation. Therefore consistent with high-grade lymphoma. She has completed 6 cycles of R-CHOP and has been recommended maintenance with rituximab based on her prior B-cell SLL component. Now with progressive disease. #Treatment related toxicity- fatigue. Bone marrow suppression, thrombocytopenia    #Right lower extremity edema-likely secondary to compressive right inguinal adenopathy/Baker's cyst on the right. No signs of DVT on prior vascular study 6/18/2019.   She continues to have significant leg swelling in the right lower extremity. #Hypertension-controlled. Follow-up with PCP. # MildTransaminitis- 2/2 lymphoma. Stable. #Macrocytic anemia-secondary to chemotherapy. Hemoglobin 11.1    #Thrombocytopenia-secondary to chemotherapy: 53,000. Plan:  Delay treatment for 1 week due to thrombocytopenia  Allopurinol 300 mg p.o. daily  RTC 4 weeks MD  Referral to radiation oncology/Dr Horne      Return for delay treatment 1 week, Dr. Juwan Cramer in 4 weeks in tx room. Dr. Esha Mazariegos referral for appointment today,      Sheila Saavedra am scribing for Monqiue Rueda MD. Electronically signed by Jayson Larry RN on 11/30/2020 at 2:10 PM CST. I, Dr Garcia Shaffer, personally performed the services described in this documentation as scribed by Jayson Larry RN in my presence and is both accurate and complete. I have seen, examined and reviewed this patient medication list, appropriate labs and imaging studies. I reviewed relevant medical records and others physicians notes. I discussed the plans of care with the patient. I answered all the questions to the patients satisfaction. (Please note that portions of this note were completed with a voice recognition program. Efforts were made to edit the dictations but occasionally words are mis-transcribed.)  Over 50% of the total visit time of 25 minutes in face to face encounter with the patient, out of which more than 50% of the time was spent in counseling patient or family and coordination of care. Counseling included but was not limited to time spent reviewing labs, imaging studies/ treatment plan and answering questions.

## 2020-11-30 ENCOUNTER — DOCUMENTATION (OUTPATIENT)
Dept: RADIATION ONCOLOGY | Facility: HOSPITAL | Age: 75
End: 2020-11-30

## 2020-11-30 ENCOUNTER — CONSULT (OUTPATIENT)
Dept: RADIATION ONCOLOGY | Facility: HOSPITAL | Age: 75
End: 2020-11-30

## 2020-11-30 ENCOUNTER — HOSPITAL ENCOUNTER (OUTPATIENT)
Dept: RADIATION ONCOLOGY | Facility: HOSPITAL | Age: 75
Setting detail: RADIATION/ONCOLOGY SERIES
End: 2020-11-30

## 2020-11-30 VITALS
HEIGHT: 59 IN | DIASTOLIC BLOOD PRESSURE: 79 MMHG | SYSTOLIC BLOOD PRESSURE: 144 MMHG | BODY MASS INDEX: 24.6 KG/M2 | WEIGHT: 122 LBS

## 2020-11-30 DIAGNOSIS — R59.0 LYMPHADENOPATHY, INGUINAL: ICD-10-CM

## 2020-11-30 DIAGNOSIS — D61.810 ANTINEOPLASTIC CHEMOTHERAPY INDUCED PANCYTOPENIA (CODE) (HCC): ICD-10-CM

## 2020-11-30 DIAGNOSIS — I89.0 LYMPHEDEMA OF RIGHT LOWER EXTREMITY: ICD-10-CM

## 2020-11-30 DIAGNOSIS — C83.38 DIFFUSE LARGE B-CELL LYMPHOMA OF LYMPH NODES OF MULTIPLE REGIONS (HCC): Primary | ICD-10-CM

## 2020-11-30 PROBLEM — C83.30 DIFFUSE LARGE B-CELL LYMPHOMA (HCC): Status: ACTIVE | Noted: 2020-08-17

## 2020-11-30 PROBLEM — C85.90 LYMPHOMA (HCC): Status: ACTIVE | Noted: 2019-06-06

## 2020-11-30 PROCEDURE — G0463 HOSPITAL OUTPT CLINIC VISIT: HCPCS | Performed by: RADIOLOGY

## 2020-11-30 RX ORDER — LISINOPRIL AND HYDROCHLOROTHIAZIDE 20; 12.5 MG/1; MG/1
1 TABLET ORAL DAILY
COMMUNITY
Start: 2020-09-25

## 2020-11-30 RX ORDER — ALLOPURINOL 300 MG/1
300 TABLET ORAL DAILY
COMMUNITY
Start: 2020-11-03

## 2020-11-30 RX ORDER — HYDROCODONE BITARTRATE AND ACETAMINOPHEN 5; 325 MG/1; MG/1
1 TABLET ORAL EVERY 6 HOURS PRN
COMMUNITY
Start: 2020-11-09 | End: 2020-12-09

## 2020-11-30 RX ORDER — MEGESTROL ACETATE 40 MG/ML
10 SUSPENSION ORAL DAILY
COMMUNITY
Start: 2020-09-18

## 2020-11-30 NOTE — PROGRESS NOTES
ALBERTO met with Ms. Jean who is a 75 year old female here for a radiation consultation. She lives alone and has a limited support system. Ms. Jean plans to drive herself to treatments and she does not have any financial concerns at this time. She is independent with her ADLS. Ms. Jean has one daughter who is currently in the hospital and will be staying with her once she is discharged. Ms. Jean did enjoy sewing and quilting in the past. She denies needing any assistance at this time but will contact this writer if assistance is needed in the future.

## 2020-11-30 NOTE — PROGRESS NOTES
20-12.5 MG per tablet Take 1 tablet by mouth daily 90 tablet 3    potassium chloride (KLOR-CON M) 20 MEQ extended release tablet Take 1 tablet by mouth daily for 14 days 14 tablet 0     No current facility-administered medications for this visit. Allergies: Patient has no known allergies. Past Medical History:   Diagnosis Date    Body mass index (bmi) 26.0-26.9, adult     Hypertension     Osteoporosis     Unspecified b-cell lymphoma, lymph nodes of inguinal region and lower limb [facility])      Past Surgical History:   Procedure Laterality Date    CHOLECYSTECTOMY      COLONOSCOPY  2009    Dr Rosa Santos Right 11/3/2020    PUNCH BIOPSY MASS RIGHT FOOT performed by Jean Tovar MD at [address] LYMPH NODE BIOPSY Right 3/4/2019    EXCISION OF SUPERFICIAL LYMPH NODE, RIGHT GROIN performed by Luciano Graves DO at [facility] [address] Left 11/3/2020    ULTRASOUND GUIDED BIOPSY LEFT GROIN performed by Jean Tovar MD at [place] [address] Right 3/31/2020    PUNCH BIOPSY OF PUBIS performed by Jean Tovar MD at [address] [place] [address] N/A 3/31/2020    PORT INSERTION WITH FLUORO performed by Jean Tovar MD at [facility] History   Problem Relation Age of Onset   Cameron Villanueva Mother         not a smoker    Tuberculosis Father      Social History     Tobacco Use    Smoking status: Former Smoker     Packs/day: 1.00     Years: 2.00     Pack years: 2.00     Types: Cigarettes     Start date:      Last attempt to quit:      Years since quittin.9    Smokeless tobacco: Never Used    Tobacco comment: Retired from being a  and [address] [place] [address]   Substance Use Topics    Alcohol use: No       Objective:    Blood pressure 110/60, temperature 97.3 °F (36.3 °C), temperature source Temporal, height 4' 11\" (1.499 m), weight 117 lb (53.1 kg), not currently breastfeeding.     In palpation of the left groin, the adenopathy does appear to be a little bit smaller. The right foot lesion appears to be doing well with no appreciable drainage. Assessment:  B-cell lymphoma with manifestations on the right foot and left groin      Plan: We will plan to see the patient back in the office on a as needed basis.

## 2020-12-01 ENCOUNTER — HOSPITAL ENCOUNTER (OUTPATIENT)
Dept: RADIATION ONCOLOGY | Facility: HOSPITAL | Age: 75
Setting detail: RADIATION/ONCOLOGY SERIES
Discharge: HOME | End: 2020-12-01

## 2020-12-01 ENCOUNTER — HOSPITAL ENCOUNTER (OUTPATIENT)
Dept: RADIATION ONCOLOGY | Facility: HOSPITAL | Age: 75
Setting detail: RADIATION/ONCOLOGY SERIES
End: 2020-12-01

## 2020-12-01 ENCOUNTER — TREATMENT (OUTPATIENT)
Dept: PHYSICAL THERAPY | Facility: CLINIC | Age: 75
End: 2020-12-01

## 2020-12-01 DIAGNOSIS — I89.0 LYMPHEDEMA OF RIGHT LOWER EXTREMITY: Primary | ICD-10-CM

## 2020-12-01 PROCEDURE — 97530 THERAPEUTIC ACTIVITIES: CPT | Performed by: PHYSICAL THERAPIST

## 2020-12-01 PROCEDURE — 97140 MANUAL THERAPY 1/> REGIONS: CPT | Performed by: PHYSICAL THERAPIST

## 2020-12-01 PROCEDURE — 77290 THER RAD SIMULAJ FIELD CPLX: CPT | Performed by: RADIOLOGY

## 2020-12-01 PROCEDURE — 77334 RADIATION TREATMENT AID(S): CPT | Performed by: RADIOLOGY
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
## 2020-12-03 PROCEDURE — 77295 3-D RADIOTHERAPY PLAN: CPT | Performed by: RADIOLOGY

## 2020-12-03 PROCEDURE — 77334 RADIATION TREATMENT AID(S): CPT | Performed by: RADIOLOGY

## 2020-12-03 PROCEDURE — 77300 RADIATION THERAPY DOSE PLAN: CPT | Performed by: RADIOLOGY

## 2020-12-09 ENCOUNTER — HOSPITAL ENCOUNTER (OUTPATIENT)
Dept: RADIATION ONCOLOGY | Facility: HOSPITAL | Age: 75
Setting detail: RADIATION/ONCOLOGY SERIES
Discharge: HOME | End: 2020-12-09

## 2020-12-09 PROCEDURE — 77412 RADIATION TX DELIVERY LVL 3: CPT | Performed by: RADIOLOGY

## 2020-12-09 PROCEDURE — 77280 THER RAD SIMULAJ FIELD SMPL: CPT | Performed by: RADIOLOGY

## 2020-12-09 PROCEDURE — 77332 RADIATION TREATMENT AID(S): CPT | Performed by: RADIOLOGY

## 2020-12-10 ENCOUNTER — HOSPITAL ENCOUNTER (OUTPATIENT)
Dept: RADIATION ONCOLOGY | Facility: HOSPITAL | Age: 75
Discharge: HOME | End: 2020-12-10

## 2020-12-10 PROCEDURE — 77412 RADIATION TX DELIVERY LVL 3: CPT | Performed by: RADIOLOGY

## 2020-12-10 PROCEDURE — 77417 THER RADIOLOGY PORT IMAGE(S): CPT | Performed by: RADIOLOGY

## 2020-12-11 ENCOUNTER — HOSPITAL ENCOUNTER (OUTPATIENT)
Dept: RADIATION ONCOLOGY | Facility: HOSPITAL | Age: 75
Setting detail: RADIATION/ONCOLOGY SERIES
Discharge: HOME | End: 2020-12-11

## 2020-12-11 PROCEDURE — 77412 RADIATION TX DELIVERY LVL 3: CPT | Performed by: RADIOLOGY

## 2020-12-14 ENCOUNTER — HOSPITAL ENCOUNTER (OUTPATIENT)
Dept: RADIATION ONCOLOGY | Facility: HOSPITAL | Age: 75
Setting detail: RADIATION/ONCOLOGY SERIES
Discharge: HOME | End: 2020-12-14

## 2020-12-14 PROCEDURE — 77412 RADIATION TX DELIVERY LVL 3: CPT | Performed by: RADIOLOGY

## 2020-12-15 ENCOUNTER — HOSPITAL ENCOUNTER (OUTPATIENT)
Dept: RADIATION ONCOLOGY | Facility: HOSPITAL | Age: 75
Setting detail: RADIATION/ONCOLOGY SERIES
Discharge: HOME | End: 2020-12-15

## 2020-12-15 PROCEDURE — 77412 RADIATION TX DELIVERY LVL 3: CPT | Performed by: RADIOLOGY

## 2020-12-16 ENCOUNTER — HOSPITAL ENCOUNTER (OUTPATIENT)
Dept: RADIATION ONCOLOGY | Facility: HOSPITAL | Age: 75
Setting detail: RADIATION/ONCOLOGY SERIES
Discharge: HOME | End: 2020-12-16

## 2020-12-16 PROCEDURE — 77336 RADIATION PHYSICS CONSULT: CPT | Performed by: RADIOLOGY

## 2020-12-16 PROCEDURE — 77412 RADIATION TX DELIVERY LVL 3: CPT | Performed by: RADIOLOGY

## 2020-12-17 ENCOUNTER — HOSPITAL ENCOUNTER (OUTPATIENT)
Dept: RADIATION ONCOLOGY | Facility: HOSPITAL | Age: 75
Setting detail: RADIATION/ONCOLOGY SERIES
Discharge: HOME | End: 2020-12-17

## 2020-12-17 PROCEDURE — 77412 RADIATION TX DELIVERY LVL 3: CPT | Performed by: RADIOLOGY

## 2020-12-17 PROCEDURE — 77417 THER RADIOLOGY PORT IMAGE(S): CPT | Performed by: RADIOLOGY

## 2020-12-18 ENCOUNTER — HOSPITAL ENCOUNTER (OUTPATIENT)
Dept: RADIATION ONCOLOGY | Facility: HOSPITAL | Age: 75
Setting detail: RADIATION/ONCOLOGY SERIES
Discharge: HOME | End: 2020-12-18

## 2020-12-18 PROCEDURE — 77412 RADIATION TX DELIVERY LVL 3: CPT | Performed by: RADIOLOGY

## 2020-12-21 ENCOUNTER — HOSPITAL ENCOUNTER (OUTPATIENT)
Dept: RADIATION ONCOLOGY | Facility: HOSPITAL | Age: 75
Setting detail: RADIATION/ONCOLOGY SERIES
Discharge: HOME | End: 2020-12-21

## 2020-12-21 PROCEDURE — 77412 RADIATION TX DELIVERY LVL 3: CPT | Performed by: RADIOLOGY

## 2020-12-22 ENCOUNTER — HOSPITAL ENCOUNTER (OUTPATIENT)
Dept: RADIATION ONCOLOGY | Facility: HOSPITAL | Age: 75
Setting detail: RADIATION/ONCOLOGY SERIES
Discharge: HOME | End: 2020-12-22

## 2020-12-22 PROCEDURE — 77336 RADIATION PHYSICS CONSULT: CPT | Performed by: RADIOLOGY

## 2020-12-22 PROCEDURE — 77412 RADIATION TX DELIVERY LVL 3: CPT | Performed by: RADIOLOGY

## 2021-01-01 ENCOUNTER — CLINICAL DOCUMENTATION (OUTPATIENT)
Dept: HEMATOLOGY | Age: 76
End: 2021-01-01

## 2021-01-01 ENCOUNTER — HOSPITAL ENCOUNTER (OUTPATIENT)
Dept: INFUSION THERAPY | Age: 76
Discharge: HOME | End: 2021-01-04
Payer: MEDICARE

## 2021-01-01 ENCOUNTER — HOSPITAL ENCOUNTER (EMERGENCY)
Age: 76
Discharge: HOME | End: 2021-01-27
Attending: EMERGENCY MEDICINE
Payer: MEDICARE

## 2021-01-01 ENCOUNTER — OFFICE VISIT (OUTPATIENT)
Dept: HEMATOLOGY | Age: 76
End: 2021-01-01
Payer: MEDICARE

## 2021-01-01 ENCOUNTER — APPOINTMENT (OUTPATIENT)
Dept: GENERAL RADIOLOGY | Age: 76
End: 2021-01-01
Payer: MEDICARE

## 2021-01-01 ENCOUNTER — CARE COORDINATION (OUTPATIENT)
Dept: CARE COORDINATION | Age: 76
End: 2021-01-01

## 2021-01-01 VITALS
OXYGEN SATURATION: 97 % | RESPIRATION RATE: 17 BRPM | WEIGHT: 127 LBS | DIASTOLIC BLOOD PRESSURE: 72 MMHG | HEART RATE: 106 BPM | HEIGHT: 63 IN | SYSTOLIC BLOOD PRESSURE: 144 MMHG | TEMPERATURE: 97.9 F | BODY MASS INDEX: 22.5 KG/M2

## 2021-01-01 VITALS
DIASTOLIC BLOOD PRESSURE: 60 MMHG | HEART RATE: 97 BPM | HEIGHT: 59 IN | BODY MASS INDEX: 25.6 KG/M2 | TEMPERATURE: 96.9 F | SYSTOLIC BLOOD PRESSURE: 118 MMHG | WEIGHT: 127 LBS

## 2021-01-01 DIAGNOSIS — Z51.11 CHEMOTHERAPY MANAGEMENT, ENCOUNTER FOR: ICD-10-CM

## 2021-01-01 DIAGNOSIS — C83.30 DIFFUSE LARGE B-CELL LYMPHOMA, UNSPECIFIED BODY REGION (HCC): Primary | ICD-10-CM

## 2021-01-01 DIAGNOSIS — R59.0 INGUINAL ADENOPATHY: ICD-10-CM

## 2021-01-01 DIAGNOSIS — D69.6 THROMBOCYTOPENIA (HCC): ICD-10-CM

## 2021-01-01 DIAGNOSIS — M79.9 LESION OF SOFT TISSUE OF LOWER LEG AND ANKLE: ICD-10-CM

## 2021-01-01 DIAGNOSIS — I87.8 POOR VENOUS ACCESS: ICD-10-CM

## 2021-01-01 DIAGNOSIS — E83.52 HYPERCALCEMIA: ICD-10-CM

## 2021-01-01 DIAGNOSIS — T45.1X5D ADVERSE EFFECT OF CHEMOTHERAPY, SUBSEQUENT ENCOUNTER: ICD-10-CM

## 2021-01-01 DIAGNOSIS — Z71.89 COORDINATION OF COMPLEX CARE: ICD-10-CM

## 2021-01-01 DIAGNOSIS — C83.38 DIFFUSE LARGE B-CELL LYMPHOMA, LYMPH NODES OF MULTIPLE SITES (HCC): Primary | ICD-10-CM

## 2021-01-01 DIAGNOSIS — I89.0 LYMPHEDEMA: ICD-10-CM

## 2021-01-01 DIAGNOSIS — Z71.89 CARE PLAN DISCUSSED WITH PATIENT: ICD-10-CM

## 2021-01-01 LAB
ALBUMIN SERPL-MCNC: 3.1 G/DL (ref 3.5–5.2)
ALBUMIN SERPL-MCNC: 3.7 G/DL (ref 3.5–5.2)
ALP BLD-CCNC: 138 U/L (ref 35–104)
ALP BLD-CCNC: 206 U/L (ref 35–104)
ALT SERPL-CCNC: 26 U/L (ref 5–33)
ALT SERPL-CCNC: 34 U/L (ref 9–52)
ANION GAP SERPL CALCULATED.3IONS-SCNC: 10 MMOL/L (ref 7–19)
ANION GAP SERPL CALCULATED.3IONS-SCNC: 4 MMOL/L (ref 7–19)
AST SERPL-CCNC: 34 U/L (ref 14–36)
AST SERPL-CCNC: 36 U/L (ref 5–32)
BASOPHILS ABSOLUTE: 0 K/UL (ref 0–0.2)
BASOPHILS ABSOLUTE: 0.01 K/UL (ref 0.01–0.08)
BASOPHILS RELATIVE PERCENT: 0.1 % (ref 0–1)
BASOPHILS RELATIVE PERCENT: 0.2 % (ref 0.1–1.2)
BILIRUB SERPL-MCNC: 0.5 MG/DL (ref 0.2–1.3)
BILIRUB SERPL-MCNC: 0.7 MG/DL (ref 0.2–1.2)
BUN BLDV-MCNC: 11 MG/DL (ref 8–23)
BUN BLDV-MCNC: 19 MG/DL (ref 7–17)
CALCIUM SERPL-MCNC: 10.1 MG/DL (ref 8.4–10.2)
CALCIUM SERPL-MCNC: 11.7 MG/DL (ref 8.8–10.2)
CHLORIDE BLD-SCNC: 101 MMOL/L (ref 98–111)
CHLORIDE BLD-SCNC: 93 MMOL/L (ref 98–111)
CO2: 28 MMOL/L (ref 22–29)
CO2: 29 MMOL/L (ref 22–29)
CREAT SERPL-MCNC: 0.4 MG/DL (ref 0.5–0.9)
CREAT SERPL-MCNC: <0.5 MG/DL (ref 0.5–1)
EKG P AXIS: 53 DEGREES
EKG P-R INTERVAL: 96 MS
EKG Q-T INTERVAL: 318 MS
EKG QRS DURATION: 114 MS
EKG QTC CALCULATION (BAZETT): 412 MS
EKG T AXIS: 39 DEGREES
EOSINOPHILS ABSOLUTE: 0 K/UL (ref 0–0.6)
EOSINOPHILS ABSOLUTE: 0.02 K/UL (ref 0.04–0.54)
EOSINOPHILS RELATIVE PERCENT: 0 % (ref 0–5)
EOSINOPHILS RELATIVE PERCENT: 0.3 % (ref 0.7–7)
GFR AFRICAN AMERICAN: >59
GFR NON-AFRICAN AMERICAN: >60
GFR NON-AFRICAN AMERICAN: >60
GLOBULIN: 2.7 G/DL
GLUCOSE BLD-MCNC: 100 MG/DL (ref 74–109)
GLUCOSE BLD-MCNC: 101 MG/DL (ref 74–106)
HCT VFR BLD CALC: 29.3 % (ref 34.1–44.9)
HCT VFR BLD CALC: 30.4 % (ref 37–47)
HEMOGLOBIN: 10.4 G/DL (ref 11.2–15.7)
HEMOGLOBIN: 10.4 G/DL (ref 12–16)
IMMATURE GRANULOCYTES #: 0.1 K/UL
INR BLD: 1.03 (ref 0.88–1.18)
LYMPHOCYTES ABSOLUTE: 1.2 K/UL (ref 1.1–4.5)
LYMPHOCYTES ABSOLUTE: 1.71 K/UL (ref 1.18–3.74)
LYMPHOCYTES RELATIVE PERCENT: 16.1 % (ref 20–40)
LYMPHOCYTES RELATIVE PERCENT: 29.2 % (ref 19.3–53.1)
MAGNESIUM: 1.8 MG/DL (ref 1.6–2.3)
MCH RBC QN AUTO: 33 PG (ref 27–31)
MCH RBC QN AUTO: 33.7 PG (ref 25.6–32.2)
MCHC RBC AUTO-ENTMCNC: 34.2 G/DL (ref 33–37)
MCHC RBC AUTO-ENTMCNC: 35.5 G/DL (ref 32.3–35.5)
MCV RBC AUTO: 94.8 FL (ref 79.4–94.8)
MCV RBC AUTO: 96.5 FL (ref 81–99)
MONOCYTES ABSOLUTE: 0.78 K/UL (ref 0.24–0.82)
MONOCYTES ABSOLUTE: 0.9 K/UL (ref 0–0.9)
MONOCYTES RELATIVE PERCENT: 11.2 % (ref 0–10)
MONOCYTES RELATIVE PERCENT: 13.3 % (ref 4.7–12.5)
NEUTROPHILS ABSOLUTE: 3.33 K/UL (ref 1.56–6.13)
NEUTROPHILS ABSOLUTE: 5.5 K/UL (ref 1.5–7.5)
NEUTROPHILS RELATIVE PERCENT: 57 % (ref 34–71.1)
NEUTROPHILS RELATIVE PERCENT: 71.9 % (ref 50–65)
PDW BLD-RTO: 15.8 % (ref 11.5–14.5)
PDW BLD-RTO: 17.1 % (ref 11.7–14.4)
PHOSPHORUS: 3.8 MG/DL (ref 2.5–4.5)
PLATELET # BLD: 34 K/UL (ref 182–369)
PLATELET # BLD: 56 K/UL (ref 130–400)
PMV BLD AUTO: 11.2 FL (ref 9.4–12.3)
PMV BLD AUTO: 9.1 FL (ref 7.4–10.4)
POTASSIUM SERPL-SCNC: 3.4 MMOL/L (ref 3.5–5)
POTASSIUM SERPL-SCNC: 3.7 MMOL/L (ref 3.5–5.1)
PROTHROMBIN TIME: 13.4 SEC (ref 12–14.6)
RBC # BLD: 3.09 M/UL (ref 3.93–5.22)
RBC # BLD: 3.15 M/UL (ref 4.2–5.4)
SODIUM BLD-SCNC: 131 MMOL/L (ref 136–145)
SODIUM BLD-SCNC: 134 MMOL/L (ref 137–145)
TOTAL CK: 168 U/L (ref 26–192)
TOTAL PROTEIN: 5.3 G/DL (ref 6.6–8.7)
TOTAL PROTEIN: 6.4 G/DL (ref 6.3–8.2)
URIC ACID, SERUM: 2.7 MG/DL (ref 2.5–5.2)
WBC # BLD: 5.85 K/UL (ref 3.98–10.04)
WBC # BLD: 7.7 K/UL (ref 4.8–10.8)

## 2021-01-01 PROCEDURE — 73552 X-RAY EXAM OF FEMUR 2/>: CPT

## 2021-01-01 PROCEDURE — G8484 FLU IMMUNIZE NO ADMIN: HCPCS | Performed by: NURSE PRACTITIONER

## 2021-01-01 PROCEDURE — 84550 ASSAY OF BLOOD/URIC ACID: CPT

## 2021-01-01 PROCEDURE — 96523 IRRIG DRUG DELIVERY DEVICE: CPT

## 2021-01-01 PROCEDURE — 85025 COMPLETE CBC W/AUTO DIFF WBC: CPT

## 2021-01-01 PROCEDURE — 4040F PNEUMOC VAC/ADMIN/RCVD: CPT | Performed by: NURSE PRACTITIONER

## 2021-01-01 PROCEDURE — 2580000003 HC RX 258: Performed by: INTERNAL MEDICINE

## 2021-01-01 PROCEDURE — 84100 ASSAY OF PHOSPHORUS: CPT

## 2021-01-01 PROCEDURE — 80053 COMPREHEN METABOLIC PANEL: CPT

## 2021-01-01 PROCEDURE — 99284 EMERGENCY DEPT VISIT MOD MDM: CPT

## 2021-01-01 PROCEDURE — 1123F ACP DISCUSS/DSCN MKR DOCD: CPT | Performed by: NURSE PRACTITIONER

## 2021-01-01 PROCEDURE — 3017F COLORECTAL CA SCREEN DOC REV: CPT | Performed by: NURSE PRACTITIONER

## 2021-01-01 PROCEDURE — 99204 OFFICE O/P NEW MOD 45 MIN: CPT | Performed by: NURSE PRACTITIONER

## 2021-01-01 PROCEDURE — 1036F TOBACCO NON-USER: CPT | Performed by: NURSE PRACTITIONER

## 2021-01-01 PROCEDURE — 82550 ASSAY OF CK (CPK): CPT

## 2021-01-01 PROCEDURE — 85610 PROTHROMBIN TIME: CPT

## 2021-01-01 PROCEDURE — 2580000003 HC RX 258: Performed by: EMERGENCY MEDICINE

## 2021-01-01 PROCEDURE — G8399 PT W/DXA RESULTS DOCUMENT: HCPCS | Performed by: NURSE PRACTITIONER

## 2021-01-01 PROCEDURE — G8417 CALC BMI ABV UP PARAM F/U: HCPCS | Performed by: NURSE PRACTITIONER

## 2021-01-01 PROCEDURE — 73590 X-RAY EXAM OF LOWER LEG: CPT

## 2021-01-01 PROCEDURE — G8427 DOCREV CUR MEDS BY ELIG CLIN: HCPCS | Performed by: NURSE PRACTITIONER

## 2021-01-01 PROCEDURE — 6360000002 HC RX W HCPCS: Performed by: INTERNAL MEDICINE

## 2021-01-01 PROCEDURE — 1090F PRES/ABSN URINE INCON ASSESS: CPT | Performed by: NURSE PRACTITIONER

## 2021-01-01 PROCEDURE — 36415 COLL VENOUS BLD VENIPUNCTURE: CPT

## 2021-01-01 PROCEDURE — 83735 ASSAY OF MAGNESIUM: CPT

## 2021-01-01 PROCEDURE — 93005 ELECTROCARDIOGRAM TRACING: CPT | Performed by: EMERGENCY MEDICINE

## 2021-01-01 RX ORDER — SODIUM CHLORIDE 0.9 % (FLUSH) 0.9 %
10 SYRINGE (ML) INJECTION PRN
Status: CANCELLED | OUTPATIENT
Start: 2021-01-01

## 2021-01-01 RX ORDER — SODIUM CHLORIDE 0.9 % (FLUSH) 0.9 %
20 SYRINGE (ML) INJECTION PRN
Status: CANCELLED | OUTPATIENT
Start: 2021-01-01

## 2021-01-01 RX ORDER — HEPARIN SODIUM (PORCINE) LOCK FLUSH IV SOLN 100 UNIT/ML 100 UNIT/ML
500 SOLUTION INTRAVENOUS PRN
Status: CANCELLED | OUTPATIENT
Start: 2021-01-01

## 2021-01-01 RX ORDER — SODIUM CHLORIDE, SODIUM LACTATE, POTASSIUM CHLORIDE, CALCIUM CHLORIDE 600; 310; 30; 20 MG/100ML; MG/100ML; MG/100ML; MG/100ML
1000 INJECTION, SOLUTION INTRAVENOUS ONCE
Status: COMPLETED | OUTPATIENT
Start: 2021-01-01 | End: 2021-01-01

## 2021-01-01 RX ORDER — HEPARIN SODIUM (PORCINE) LOCK FLUSH IV SOLN 100 UNIT/ML 100 UNIT/ML
500 SOLUTION INTRAVENOUS PRN
Status: DISCONTINUED | OUTPATIENT
Start: 2021-01-01 | End: 2021-01-01 | Stop reason: HOSPADM

## 2021-01-01 RX ORDER — SODIUM CHLORIDE 0.9 % (FLUSH) 0.9 %
10 SYRINGE (ML) INJECTION PRN
Status: DISCONTINUED | OUTPATIENT
Start: 2021-01-01 | End: 2021-01-01 | Stop reason: HOSPADM

## 2021-01-01 RX ADMIN — SODIUM CHLORIDE, POTASSIUM CHLORIDE, SODIUM LACTATE AND CALCIUM CHLORIDE 1000 ML: 600; 310; 30; 20 INJECTION, SOLUTION INTRAVENOUS at 09:55

## 2021-01-01 RX ADMIN — SODIUM CHLORIDE, PRESERVATIVE FREE 10 ML: 5 INJECTION INTRAVENOUS at 12:15

## 2021-01-01 RX ADMIN — HEPARIN 500 UNITS: 100 SYRINGE at 12:15

## 2021-01-01 ASSESSMENT — ENCOUNTER SYMPTOMS
SORE THROAT: 0
RESPIRATORY NEGATIVE: 1
BLOOD IN STOOL: 0
DIARRHEA: 0
SHORTNESS OF BREATH: 0
ABDOMINAL PAIN: 0
CONSTIPATION: 0
VOMITING: 0
EYE REDNESS: 0
EYE DISCHARGE: 0
NAUSEA: 0
SHORTNESS OF BREATH: 0
EYE PAIN: 0
EYES NEGATIVE: 1
BACK PAIN: 0
COUGH: 0
WHEEZING: 0
ABDOMINAL PAIN: 1

## 2021-01-04 ENCOUNTER — APPOINTMENT (OUTPATIENT)
Dept: RADIATION ONCOLOGY | Facility: HOSPITAL | Age: 76
End: 2021-01-04

## 2021-01-04 NOTE — PROGRESS NOTES
· 12/3/2019Gazyva/venetoclax  · 3/20/2020 7/6/2020 CHOP cycle #1 to be followed by R-CHOP cycle 26 with G-CSF support  · 09/10/2020-Maintenance Rituximab Hycela every 2 months x 2 years  · 11/9/2020started second line therapy for diffuse large B-cell lymphoma with bendamustine, rituximab and polatuzumab, only received 1 cycle due to severe bone marrow suppression, thrombocytopenia and anemia  · 12/1/2020-12/22/2020-palliative radiation therapy to right dorsal foot and right lower abdomen/right groin mass for a total of 3000 cGy to each area.     Ms Cailin Rosales was first seen by Dr. Kathie Watson on 3/27/2019 referred by Dr. Maciel for a diagnosis of B-cell lymphoma. The patient had a history of a right inguinal adenopathy that was treated with antibiotic without resolution. Therefore a biopsy was performed. · 12/13/2018CT abdomen pelvis showed a moderately prominent right inguinal lymph node. In addition, an oval solid nodule in the left labium/left vagina wall measures 2.5 x 1.6 cm. · 2/20/2019the patient was seen by Dr. Wu Bustillo, who requested an ultrasound of the pelvis. · 2/28/2019US pelvis showed a hypoechoic 2.3 x 2.1 x 2.1 cm area within the vagina concerning for Bartholin's cyst.   · 2/28/2019сергей was seen by Dr. Maciel from General surgery with complains of enlarging right inguinal adenopathy, and therefore was recommended FNA biopsy   · 3/4/2019FNA biopsy consistent B-cell lymphoma on morphology and IHC (positive for CD3, CD5 and CD20 and negative for BCL-2, BCL 6, cyclin D1, CD30 and MUM-1. Extensive necrosis, and therefore, further characterization was not possible. · 3/27/2019сергей was first seen by me. · 4/3/2019bone marrow biopsy/aspirate showed involvement by a cyclin D1 negative, CD5/CD23 positive monoclonal B-cell population consistent with involvement of bone marrow by B-cell SLL (10%). CD20 positive. Normal female karyotype 55 XX.  FISH cytogenetics 13 q deletion · 4/8/2019-CT chest showed no evidence of mediastinal, hilar adenopathy. · 4/9/2019PET scan showed FDG avid enlarged right pelvic and inguinal lymph nodes. Suggestive for active lymphoma. Specifically, a right obturator lymph node with SUV 23. Right external iliac lymph node with SUV 27. Right inguinal lymph node with SUV 11.  · May through November 2019Imbruvica with progressive disease or poor tolerance. · 12/3/2019 venetoclax/Gazyva fixed duration treatment 1 year expected. · 3/16/2020-CT chest abdomen pelvis showed no evidence of lymphadenopathy in the chest. Marked interval progression of disease in the abdomen and pelvis. Lesions are seen in the liver, pancreas, periportal region, left retroperitoneal area, and bilateral inguinal chains. There is also enlargement of previously seen right pelvic lymph nodes. · 3/17/2020 core biopsy of right inguinal lymph node consistent with high-grade lymphoma. Flow cytometry positive for CD20 and BCL 6 and negative for CD5. BCL-2 mostly negative. Ki-67 95%. Findings consistent with high-grade B-cell lymphoma. · 3/20/2020 recommended 6 cycles of R-CHOP. · 5/29/2020- Ct Chest/Abdomen/Pelvis W Contrast A right cardiophrenic angle lymph node is smaller on the current study. There is no other lymphadenopathy in the chest.Scattered tiny subpleural pulmonary nodules are likely postinfectious or postinflammatory. They are stable in appearance. There are no new pulmonary nodules. Scattered tiny nodules in both thyroid lobes measuring up to 3 or 4 mm. Positive treatment response with decreased size of liver lesions and decrease in abdominopelvic lymphadenopathy. · 7/20/2020 CT Chest/Abdomen/Pelvis-The decrease in size of an abnormal right cardiophrenic sulcus lymph node since the previous study. No other abnormal lymph node in the mediastinum and marcelo are noted. Several scattered tiny subpleural nodules in both lungs, more numerous in the right upper lung are stable since the previous study and may represent a benign process. Continued decrease in liver lesions and lymphadenopathy. Findings are consistent with continued treatment response when compared to CT from 5/29/2020. Mild RIGHT hydronephrosis, likely related to ureteral compression by pelvic adenopathy. · 7/20/2020 Pet Ct Skull Base To Mid Thigh- There are hypermetabolic liver lesions as well hypermetabolic partially calcified lymph nodes within the periportal region and the right pelvic wall as well as bilateral inguinal regions. The greatest intensity of uptake is along the lateral margin of the enlarged right common femoral lymph node. Please refer to dictation above. Findings represent progression compared to the previous PET exam of 4/19/2019. Please refer to diagnostic CT exams from today to compare to recent CT studies of 5/29/2020. Moderate right-sided hydronephrosis secondary to the lymph node mass within the right lower pelvis. Focus of increased FDG uptake of the right perineum/labia. This may be urinary contamination, however clinical assessment of the region recommended to exclude a soft tissue nodule. · 8/17/2020discussed the results of her CT scan/PET scan. Overall interval improvement on her CT scans but persistent SUV. I believe that the persistent SUV may be related to a low-grade component. Therefore I have recommended maintenance rituximab subcutaneously every 2 months x12 cycles. · 9/10/2020 maintenance rituximab subcutaneous x2 years  · 10/20/2020interval increase of bilateral inguinal adenopathy. This is concerning for recurrent lymphoma. Dr Tierra Flores Right 11/3/2020    PUNCH BIOPSY MASS RIGHT FOOT performed by Leonard Vega MD at [facility], [address] 94 LYMPH NODE BIOPSY Right 3/4/2019    EXCISION OF SUPERFICIAL LYMPH NODE, RIGHT GROIN performed by Winsome Siu DO at [facility] 222 Left 11/3/2020    ULTRASOUND GUIDED BIOPSY LEFT GROIN performed by Leonard Vega MD at [facility] [address] Right 3/31/2020    PUNCH BIOPSY OF PUBIS performed by Leonard Vega MD at [address] [place] [address] N/A 3/31/2020    PORT INSERTION WITH FLUORO performed by Leonard Vega MD at [facility] OR       Current Medications:    Current Outpatient Medications   Medication Sig Dispense Refill    lisinopril-hydroCHLOROthiazide (PRINZIDE;ZESTORETIC) 20-12.5 MG per tablet Take 1 tablet by mouth once daily 90 tablet 0    megestrol (MEGACE) 40 MG/ML suspension Take 10 mLs by mouth daily 600 mL 1    allopurinol (ZYLOPRIM) 300 MG tablet Take 1 tablet by mouth daily 90 tablet 1    mupirocin (BACTROBAN) 2 % ointment Apply to each nostril BID 5 days prior to surgery 1 Tube 0    potassium chloride (KLOR-CON M) 20 MEQ extended release tablet Take 1 tablet by mouth daily for 14 days 14 tablet 0    riTUXimab (RITUXAN) 100 MG/10ML chemo injection Infuse 375 mg/m2 intravenously once      calcitonin (MIACALCIN) 200 UNIT/ACT nasal spray 1 spray by Nasal route daily 1 Bottle 5     No current facility-administered medications for this visit.       Facility-Administered Medications Ordered in Other Visits   Medication Dose Route Frequency Provider Last Rate Last Admin    sodium chloride flush 0.9 % injection 10 mL  10 mL Intravenous PRN Edward Resendiz MD   10 mL at 01/04/21 1215    heparin flush 100 UNIT/ML injection 500 Units  500 Units Intracatheter PRN Edward Resendiz MD   500 Units at 01/04/21 1215        Allergies: No Known Allergies    Social History:    Social History     Tobacco Use  Smoking status: Former Smoker     Packs/day: 1.00     Years: 2.00     Pack years: 2.00     Types: Cigarettes     Start date: 65     Quit date:      Years since quittin.0    Smokeless tobacco: Never Used    Tobacco comment: Retired from being a  and [id number]   Substance Use Topics    Alcohol use: No    Drug use: No       Family History:   Family History   Problem Relation Age of Onset    Lung Cancer Mother         not a smoker    Tuberculosis Father        Vitals:  Vitals:    21 1228   BP: 118/60   Pulse: 97   Temp: 96.9 °F (36.1 °C)   Weight: 127 lb (57.6 kg)   Height: 4' 11\" (1.499 m)        Subjective   REVIEW OF SYSTEMS:   Review of Systems   Constitutional: Positive for activity change and fatigue. Negative for chills, diaphoresis and fever. HENT: Negative. Negative for congestion, ear pain, hearing loss, nosebleeds, sore throat and tinnitus. Eyes: Negative. Negative for pain, discharge and redness. Respiratory: Negative. Negative for cough, shortness of breath and wheezing. Cardiovascular: Positive for leg swelling (Right lower extremity secondary to disease process). Negative for chest pain and palpitations. Gastrointestinal: Positive for abdominal pain (Secondary to lymphoma). Negative for blood in stool, constipation, diarrhea, nausea and vomiting. Endocrine: Negative for polydipsia. Genitourinary: Negative for dysuria, flank pain, frequency, hematuria and urgency. Musculoskeletal: Negative. Negative for back pain, myalgias and neck pain. Skin: Negative. Negative for rash. Right lower extremity with significant edema, weeping, skin tightness. Open lesion to dorsal right foot secondary to lymphoma   Neurological: Positive for weakness. Negative for dizziness, tremors, seizures and headaches. Hematological: Does not bruise/bleed easily. Psychiatric/Behavioral: Negative. The patient is not nervous/anxious.         Objective PHYSICAL EXAM:  Physical Exam  Vitals signs reviewed. Constitutional:       General: She is not in acute distress. Appearance: She is well-developed. She is ill-appearing. She is not diaphoretic. HENT:      Head: Normocephalic and atraumatic. Mouth/Throat:      Pharynx: Uvula midline. Tonsils: No tonsillar exudate. Eyes:      General: Lids are normal. No scleral icterus. Right eye: No discharge. Left eye: No discharge. Conjunctiva/sclera: Conjunctivae normal.      Pupils: Pupils are equal, round, and reactive to light. Neck:      Musculoskeletal: Normal range of motion and neck supple. Thyroid: No thyroid mass or thyromegaly. Vascular: No JVD. Trachea: Trachea normal. No tracheal deviation. Cardiovascular:      Rate and Rhythm: Normal rate and regular rhythm. Heart sounds: Normal heart sounds. No murmur. No friction rub. No gallop. Pulmonary:      Effort: Pulmonary effort is normal. No respiratory distress. Breath sounds: Normal breath sounds. No wheezing or rales. Chest:      Chest wall: No tenderness. Abdominal:      General: Bowel sounds are normal. There is no distension. Palpations: Abdomen is soft. There is no mass. Tenderness: There is abdominal tenderness (Right lower quadrant, firm secondary to lymphoma). There is no guarding or rebound. Hernia: No hernia is present. Comments: Right inguinal area tender and firm   Musculoskeletal:         General: No tenderness or deformity. Right lower leg: Edema (Secondary to lymphedema) present. Comments: Range of motion within normal limits x4 extremities   Skin:     General: Skin is warm. Coloration: Skin is not pale. Findings: No erythema or rash. Comments: Right lower extremity 3+ edema secondary to lymphedema from inguinal area down.   Erythemic, weeping and tender Open wound right dorsal foot secondary to lymphoma with dressing clean dry and intact   Neurological:      Mental Status: She is alert and oriented to person, place, and time. Cranial Nerves: No cranial nerve deficit. Motor: Weakness present. Coordination: Coordination normal.      Gait: Gait abnormal (Secondary to limited mobility of right lower extremity). Psychiatric:         Behavior: Behavior normal.         Thought Content: Thought content normal.         Labs reviewed today:  Lab Results   Component Value Date    WBC 5.85 01/04/2021    HGB 10.4 (L) 01/04/2021    HCT 29.3 (L) 01/04/2021    MCV 94.8 01/04/2021    PLT 34 (LL) 01/04/2021     Lab Results   Component Value Date    NEUTROABS 3.33 01/04/2021       ASSESSMENT/PLAN:      1. Refractory high-grade, diffuse large b-cell lymphoma, stage IV lymph nodes of multiple sites [facility]), significant disease progression and was initiated on second line therapy with bendamustine, rituximab and polatuzumab, cycle #1 was given on 11/9/2020 -11/11/2020. Completed palliative radiation therapy on 12/22/2020 to her right dorsal foot and right lower abdomen/right groin mass for a total of 3000 cGy to each area. Returns today for consideration for cycle #2 palliative treatment, unfortunately bone marrow has yet to recover for severe thrombocytopenia due to extensive disease. This is week 2 of delay due to thrombocytopenia. Discussed precautions related to [address] and being at increased risk. Discussed proper handwashing to be done frequently, limit exposure to other individuals and maintain social distancing of 6 feet. Recommend contacting primary care provider if having respiratory symptoms for further recommendations and consideration for testing.     (Please note that portions of this note were completed with a voice recognition program. Efforts were made to edit the dictations but occasionally words are mis-transcribed.)    Electronically signed by BEE Coelho on 1/4/2021 at 5:00 PM

## 2021-01-27 NOTE — ED NOTES
wads noted to rt lower leg, purulent drainage noted, wd to rt thigh no drainage     Re Xavier, RN  01/27/21 0309
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
## 2021-01-27 NOTE — CARE COORDINATION
CM outreached to Sole, daughter, and reviewed plan for pt to return home. CM reviewed levels of care available to pt under Hospice benefit. Sole wants pt to come home. Lavinia, neighbor is transport patient home.  Enrique Titus will meet pt at home upon arrival.  Electronically signed by Wilma Zhang RN on 1/27/2021 at 5:58 PM
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
## 2021-01-27 NOTE — ED NOTES
Bed: 01-A  Expected date:   Expected time:   Means of arrival:   Comments:  [facility] [facility], RN  01/27/21 2777

## 2021-01-27 NOTE — ED NOTES
Pt cleaned, new gown and sheets placed. Hospice nurse at bedside. Pt wants to go home.       Jaky Membreno RN  01/27/21 [id number]

## 2021-01-28 NOTE — CARE COORDINATION
Patient contacted regarding recent discharge and COVID-19 risk. Discussed COVID-19 related testing which was not done at this time. Test results were not done. Care Transition Nurse/ Ambulatory Care Manager contacted the family by telephone to perform post discharge assessment. Verified name and  with family as identifiers. Patient has following risk factors of: immunocompromised. CTN/ACM reviewed discharge instructions, medical action plan and red flags related to discharge diagnosis. Reviewed and educated them on any new and changed medications related to discharge diagnosis. Advised obtaining a 90-day supply of all daily and as-needed medications. Education provided regarding infection prevention, and signs and symptoms of COVID-19 and when to seek medical attention with family who verbalized understanding. Discussed exposure protocols and quarantine from [address] you at higher risk for severe illness  and given an opportunity for questions and concerns. The family agrees to contact PCP office for questions related to their healthcare. CTN/ACM provided contact information for future reference. From CDC: Are you at higher risk for severe illness?  Wash your hands often.  Avoid close contact (6 feet, which is about two arm lengths) with people who are sick.  Put distance between yourself and other people if COVID-19 is spreading in your community.  Clean and disinfect frequently touched surfaces.  Avoid all cruise travel and non-essential air travel.  Call your healthcare professional if you have concerns about COVID-19 and your underlying condition or if you are sick. For more information on steps you can take to protect yourself, see CDC's How to [address] [place] [address] for follow-up call in 7-14 days based on severity of symptoms and risk factors. CTN spoke with patient's daughter. Daughter states that patient is doing well right now.   Patient had some

## 2021-01-29 NOTE — PROGRESS NOTES
Call to dtr/Xin to see how pt is doing and to see if she would be able to attend 2/2 appt in [facility] with Dr. Digna Trinh. Xin stated that pt has Hospice, is bedridden and unable to go anywhere. Signer informed Xin that appt would be cancelled and encouraged her to call this office if she needed anything. Xin thanked signer and agreed to plan of care.

## 2021-02-01 ENCOUNTER — HOSPITAL ENCOUNTER (OUTPATIENT)
Dept: RADIATION ONCOLOGY | Facility: HOSPITAL | Age: 76
Setting detail: RADIATION/ONCOLOGY SERIES
End: 2021-02-01
